# Patient Record
Sex: FEMALE | Race: WHITE | NOT HISPANIC OR LATINO | Employment: FULL TIME | URBAN - METROPOLITAN AREA
[De-identification: names, ages, dates, MRNs, and addresses within clinical notes are randomized per-mention and may not be internally consistent; named-entity substitution may affect disease eponyms.]

---

## 2017-09-07 ENCOUNTER — ALLSCRIPTS OFFICE VISIT (OUTPATIENT)
Dept: OTHER | Facility: OTHER | Age: 32
End: 2017-09-07

## 2017-09-07 DIAGNOSIS — R91.1 SOLITARY PULMONARY NODULE: ICD-10-CM

## 2017-09-07 DIAGNOSIS — K59.09 OTHER CONSTIPATION: ICD-10-CM

## 2017-09-07 DIAGNOSIS — K80.20 CALCULUS OF GALLBLADDER WITHOUT CHOLECYSTITIS WITHOUT OBSTRUCTION: ICD-10-CM

## 2017-09-12 ENCOUNTER — GENERIC CONVERSION - ENCOUNTER (OUTPATIENT)
Dept: OTHER | Facility: OTHER | Age: 32
End: 2017-09-12

## 2017-09-13 LAB
25(OH)D3 SERPL-MCNC: 35.2 NG/ML (ref 30–100)
A/G RATIO (HISTORICAL): 1.9 (ref 1.2–2.2)
ALBUMIN SERPL BCP-MCNC: 4.2 G/DL (ref 3.5–5.5)
ALP SERPL-CCNC: 40 IU/L (ref 39–117)
ALT SERPL W P-5'-P-CCNC: 12 IU/L (ref 0–32)
AST SERPL W P-5'-P-CCNC: 17 IU/L (ref 0–40)
BACTERIA UR QL AUTO: NORMAL
BASOPHILS # BLD AUTO: 0 X10E3/UL (ref 0–0.2)
BASOPHILS # BLD AUTO: 1 %
BILIRUB SERPL-MCNC: 0.6 MG/DL (ref 0–1.2)
BILIRUB UR QL STRIP: NEGATIVE
BUN SERPL-MCNC: 10 MG/DL (ref 6–20)
BUN/CREA RATIO (HISTORICAL): 15 (ref 9–23)
CALCIUM SERPL-MCNC: 9.1 MG/DL (ref 8.7–10.2)
CHLORIDE SERPL-SCNC: 102 MMOL/L (ref 96–106)
CHOLEST SERPL-MCNC: 141 MG/DL (ref 100–199)
CHOLEST/HDLC SERPL: 1.7 RATIO UNITS (ref 0–4.4)
CO2 SERPL-SCNC: 25 MMOL/L (ref 18–29)
COLOR UR: YELLOW
COMMENT (HISTORICAL): CLEAR
CREAT SERPL-MCNC: 0.66 MG/DL (ref 0.57–1)
DEPRECATED RDW RBC AUTO: 13 % (ref 12.3–15.4)
EGFR AFRICAN AMERICAN (HISTORICAL): 135 ML/MIN/1.73
EGFR-AMERICAN CALC (HISTORICAL): 117 ML/MIN/1.73
EOSINOPHIL # BLD AUTO: 0.1 X10E3/UL (ref 0–0.4)
EOSINOPHIL # BLD AUTO: 2 %
FECAL OCCULT BLOOD DIAGNOSTIC (HISTORICAL): NEGATIVE
GLUCOSE (HISTORICAL): NEGATIVE
GLUCOSE SERPL-MCNC: 88 MG/DL (ref 65–99)
HCT VFR BLD AUTO: 37.1 % (ref 34–46.6)
HDLC SERPL-MCNC: 83 MG/DL
HGB BLD-MCNC: 12.6 G/DL (ref 11.1–15.9)
IMM.GRANULOCYTES (CD4/8) (HISTORICAL): 0 %
IMM.GRANULOCYTES (CD4/8) (HISTORICAL): 0 X10E3/UL (ref 0–0.1)
KETONES UR STRIP-MCNC: NEGATIVE MG/DL
LDL CHOLESTEROL DIRECT (HISTORICAL): 63 MG/DL (ref 0–99)
LDLC SERPL CALC-MCNC: 51 MG/DL (ref 0–99)
LEUKOCYTE ESTERASE UR QL STRIP: NEGATIVE
LYMPHOCYTES # BLD AUTO: 1.5 X10E3/UL (ref 0.7–3.1)
LYMPHOCYTES # BLD AUTO: 27 %
MCH RBC QN AUTO: 32.2 PG (ref 26.6–33)
MCHC RBC AUTO-ENTMCNC: 34 G/DL (ref 31.5–35.7)
MCV RBC AUTO: 95 FL (ref 79–97)
MICROSCOPIC EXAMINATION (HISTORICAL): NORMAL
MICROSCOPIC EXAMINATION (HISTORICAL): NORMAL
MONOCYTES # BLD AUTO: 0.4 X10E3/UL (ref 0.1–0.9)
MONOCYTES (HISTORICAL): 7 %
MUCUS THREADS (HISTORICAL): PRESENT
NEUTROPHILS # BLD AUTO: 3.6 X10E3/UL (ref 1.4–7)
NEUTROPHILS # BLD AUTO: 63 %
NITRITE UR QL STRIP: NEGATIVE
NON-SQ EPI CELLS URNS QL MICRO: NORMAL /HPF
PH UR STRIP.AUTO: 6.5 [PH] (ref 5–7.5)
PLATELET # BLD AUTO: 227 X10E3/UL (ref 150–379)
POTASSIUM SERPL-SCNC: 4.1 MMOL/L (ref 3.5–5.2)
PROT UR STRIP-MCNC: NEGATIVE MG/DL
RBC (HISTORICAL): 3.91 X10E6/UL (ref 3.77–5.28)
RBC (HISTORICAL): NORMAL /HPF
SODIUM SERPL-SCNC: 140 MMOL/L (ref 134–144)
SP GR UR STRIP.AUTO: 1.01 (ref 1–1.03)
T3FREE SERPL-MCNC: 2.6 PG/ML (ref 2–4.4)
T4 FREE SERPL-MCNC: 0.96 NG/DL (ref 0.82–1.77)
THYROGLOBULIN AB (HISTORICAL): <1 IU/ML (ref 0–0.9)
THYROID MICROSOMAL ANTIBODY (HISTORICAL): 7 IU/ML (ref 0–34)
TOT. GLOBULIN, SERUM (HISTORICAL): 2.2 G/DL (ref 1.5–4.5)
TOTAL PROTEIN (HISTORICAL): 6.4 G/DL (ref 6–8.5)
TRIGL SERPL-MCNC: 36 MG/DL (ref 0–149)
TSH SERPL DL<=0.05 MIU/L-ACNC: 1.88 UIU/ML (ref 0.45–4.5)
URINALYSIS (UA) (HISTORICAL): NORMAL
UROBILINOGEN UR QL STRIP.AUTO: 0.2 EU/DL (ref 0.2–1)
VLDLC SERPL CALC-MCNC: 7 MG/DL (ref 5–40)
WBC # BLD AUTO: 5.7 X10E3/UL (ref 3.4–10.8)
WBC # BLD AUTO: NORMAL /HPF

## 2017-09-14 ENCOUNTER — HOSPITAL ENCOUNTER (OUTPATIENT)
Dept: ULTRASOUND IMAGING | Facility: CLINIC | Age: 32
Discharge: HOME | End: 2017-09-14
Payer: COMMERCIAL

## 2017-09-14 DIAGNOSIS — K80.20 CALCULUS OF GALLBLADDER WITHOUT CHOLECYSTITIS WITHOUT OBSTRUCTION: ICD-10-CM

## 2017-09-14 DIAGNOSIS — K59.09 OTHER CONSTIPATION: ICD-10-CM

## 2017-09-14 PROCEDURE — 76700 US EXAM ABDOM COMPLETE: CPT

## 2017-09-18 ENCOUNTER — GENERIC CONVERSION - ENCOUNTER (OUTPATIENT)
Dept: OTHER | Facility: OTHER | Age: 32
End: 2017-09-18

## 2017-09-21 ENCOUNTER — GENERIC CONVERSION - ENCOUNTER (OUTPATIENT)
Dept: OTHER | Facility: OTHER | Age: 32
End: 2017-09-21

## 2017-10-30 ENCOUNTER — ALLSCRIPTS OFFICE VISIT (OUTPATIENT)
Dept: OTHER | Facility: OTHER | Age: 32
End: 2017-10-30

## 2017-11-01 ENCOUNTER — GENERIC CONVERSION - ENCOUNTER (OUTPATIENT)
Dept: OTHER | Facility: OTHER | Age: 32
End: 2017-11-01

## 2017-11-01 NOTE — PROGRESS NOTES
Assessment  1  Abnormal weight gain (783 1) (R63 5)   2  Chronic constipation (564 00) (K59 09)    Plan  Abnormal weight gain    · 1 - Beba Willard  Endocrinology Co-Management  *  Status: Active  Requested  for: [id number]  Care Summary provided  : Yes  Dermatitis    · 2 - Genaro Jarvis MD, Gina Irvin (Dermatology) Co-Management  *  Status: Active  Requested  for: [id number]  Care Summary provided  : Yes    Discussion/Summary    1  Chronic constipation-the patient will try the medication prescribed by GI as ordered  She will follow up with him as scheduled  She will continue with her healthy diet and will increase her water intake  Difficulty losing weight all the patient's lab work came back normal  There is no evidence of hypothyroidism  I am going to refer her to Endocrinology as ordered to help determine if there are any other metabolic causes of her difficulty losing weight  We will see her back as scheduled  Possible side effects of new medications were reviewed with the patient/guardian today  The treatment plan was reviewed with the patient/guardian  The patient/guardian understands and agrees with the treatment plan      Chief Complaint  Follow up testing, stopped taking Amitiza gotten stomach cramps and nausea  Patient is here today for follow up of chronic conditions described in HPI  History of Present Illness  Itzel Spicer is a 70-year-old female who presents today for follow-up of her chronic constipation  She did see GI in follow-up for this  They do want her to have a colonoscopy, but she has not scheduled due to scheduling issues  She could not tolerate the Amitiza due to stomach cramps  They gave her samples of Trulance for her constipation and she did not start it because she was worried about the symptoms  She is just using herbal laxatives with relief  She will try the Trulance  is exercising 3 days a week  She is trying to watch her calories and diet  There are normal menses   had tried different diet regimens and saw a nutritionist  is due to see a GYN  The patient is being seen for follow-up of constipation  Interval symptoms:  stable infrequent stools,-- stable straining at stools,-- stable hard stools,-- stable small stools,-- stable anal leakage,-- stable abdominal pain,-- stable bloating-- and-- stable   No associated symptoms are reported  Review of Systems    Constitutional: as noted in HPI  Eyes: as noted in HPI    ENT: as noted in HPI  Cardiovascular: as noted in HPI  Respiratory: as noted in HPI  Gastrointestinal: as noted in HPI  Genitourinary: as noted in HPI  Musculoskeletal: as noted in HPI  Integumentary: as noted in HPI  Neurological: as noted in HPI  Psychiatric: as noted in HPI  Active Problems  1  Chronic constipation (564 00) (K59 09)   2  Gallstone (574 20) (K80 20)   3  Pulmonary nodule seen on imaging study (793 11) (R91 1)   4  Vitamin D deficiency (268 9) (E55 9)    Past Medical History  1  History of asthma (V12 69) (Z87 09)   2  History of cholelithiasis (V12 79) (Z87 19)   3  History of iron deficiency anemia (V12 3) (Z86 2)   4  History of menorrhagia (V13 29) (Z87 42)   5  History of palpitations (V12 59) (Z87 898)    The active problems and past medical history were reviewed and updated today  Surgical History  1  History of Oral Surgery Tooth Extraction Emerson Tooth    The surgical history was reviewed and updated today  Family History  Mother    1  Family history of heart failure (V17 49) (Z82 49)  Sister    2  Family history of hypothyroidism (V18 19) (Z83 49)    The family history was reviewed and updated today  Social History   · Former smoker (H02 77) (C37 974)   · Four children   · Housewife or homemaker   ·    · No illicit drug use   · Occasional alcohol use   ·  (V61 03) (Z63 5)  The social history was reviewed and updated today  The social history was reviewed and is unchanged  Current Meds   1  No Reported Medications Recorded    Allergies  1  Amitiza    Vitals  Vital Signs    Recorded: [id number] 11:58AM   Heart Rate 60   Systolic 682, RUE, Sitting   Diastolic 70, RUE, Sitting   Height 5 ft 4 in   Weight 150 lb    BMI Calculated 25 75   BSA Calculated 1 73     Physical Exam    Constitutional   General appearance: No acute distress, well appearing and well nourished  Ears, Nose, Mouth, and Throat   External inspection of ears and nose: Normal     Otoscopic examination: Tympanic membranes translucent with normal light reflex  Canals patent without erythema  Nasal mucosa, septum, and turbinates: Normal without edema or erythema  Oropharynx: Normal with no erythema, edema, exudate or lesions  Pulmonary   Respiratory effort: No increased work of breathing or signs of respiratory distress  Auscultation of lungs: Clear to auscultation  Auscultation of the lungs revealed no expiratory wheezing,-- normal expiratory time-- and-- no inspiratory wheezing  no rales or crackles were heard bilaterally  no rhonchi  no friction rub  no wheezing  no diminished breath sounds  no bronchial breath sounds  Cardiovascular   Auscultation of heart: Normal rate and rhythm, normal S1 and S2, without murmurs  The heart rate was normal  Heart sounds: normal S1,-- normal S2,-- no S3-- and-- no S4  no murmurs were heard  Examination of extremities for edema and/or varicosities: Normal     Carotid pulses: Normal     Abdomen   Abdomen: Abnormal   The abdomen was flat  Bowel sounds were normal  Skin findings: no collateral vein dilation,-- no striae,-- no scars,-- no ostomy-- and-- no drain  There was mild tenderness that was diffuse  The abdomen was firm, but-- not rigid  No rebound tenderness  No guarding  There was a negative Hussein's sign,-- negative Rovsing's sign,-- negative obturator sign-- and-- negative psoas sign  no masses palpated  The abdomen was normal to percussion   no CVA tenderness Liver and spleen: No hepatomegaly or splenomegaly  Lymphatic   Palpation of lymph nodes in neck: No lymphadenopathy  Musculoskeletal   Gait and station: Normal     Digits and nails: Normal without clubbing or cyanosis  Inspection/palpation of joints, bones, and muscles: Normal          Results/Data  (1) TSH [id number] 10:28AM Jennie Kc     Test Name Result Flag Reference   TSH 1 880 uIU/mL  0 450-4 500     (1) FREE T3 [id number] 10:28AM Jennie Kc     Test Name Result Flag Reference   Triiodothyronine,Free,Serum 2 6 pg/mL  2 0-4 4     (1) VITAMIN D 25-HYDROXY [id number] 10:28AM Jennie Kc     Test Name Result Flag Reference   Vitamin D, 25-Hydroxy 35 2 ng/mL  30 0-100 0   Vitamin D deficiency has been defined by the [address] practice guideline as a  level of serum 25-OH vitamin D less than 20 ng/mL (1,2)  The Endocrine Society went on to further define vitamin D  insufficiency as a level between 21 and 29 ng/mL (2)  1  IOM ([place] [employer])  2010  Dietary reference     intakes for calcium and D  [facility]: The     [employer]  2  Diana MF, Sarath NC, Irvin CERON, et al      Evaluation, treatment, and prevention of vitamin D     deficiency: an Endocrine Society clinical practice     guideline  JCEM  2011 Jul; 96(7):1911-30  (1) CBC/PLT/DIFF 53Juw1905 10:28AM Jennie Kc     Test Name Result Flag Reference   WBC 5 7 x10E3/uL  3 4-10 8   RBC 3 91 x10E6/uL  3 77-5 28   Hemoglobin 12 6 g/dL  11 1-15 9   Hematocrit 37 1 %  34 0-46  6   MCV 95 fL  79-97   MCH 32 2 pg  26 6-33 0   MCHC 34 0 g/dL  31 5-35 7   RDW 13 0 %  12 3-15 4   Platelets 021 Q28N6/KA  150-379   Neutrophils 63 %     Lymphs 27 %     Monocytes 7 %     Eos 2 %     Basos 1 %     Neutrophils (Absolute) 3 6 x10E3/uL  1 4-7 0   Lymphs (Absolute) 1 5 x10E3/uL  0 7-3 1   Monocytes(Absolute) 0 4 x10E3/uL  0 1-0 9   Eos (Absolute) 0 1 x10E3/uL  0 0-0 4   Baso (Absolute) 0 0 x10E3/uL  0 0-0 2   Immature Granulocytes 0 %     Immature Grans (Abs) 0 0 x10E3/uL  0 0-0 1     (1) COMPREHENSIVE METABOLIC PANEL [id number] 79:91PZ [facility]     Test Name Result Flag Reference   Glucose, Serum 88 mg/dL  65-99   BUN 10 mg/dL  6-20   Creatinine, Serum 0 66 mg/dL  0 57-1 00   BUN/Creatinine Ratio 15  9-23   Sodium, Serum 140 mmol/L  134-144   Potassium, Serum 4 1 mmol/L  3 5-5 2   Chloride, Serum 102 mmol/L     Carbon Dioxide, Total 25 mmol/L  18-29   Calcium, Serum 9 1 mg/dL  8 7-10 2   Protein, Total, Serum 6 4 g/dL  6 0-8 5   Albumin, Serum 4 2 g/dL  3 5-5 5   Globulin, Total 2 2 g/dL  1 5-4 5   A/G Ratio 1 9  1 2-2 2   Bilirubin, Total 0 6 mg/dL  0 0-1 2   Alkaline Phosphatase, S 40 IU/L     AST (SGOT) 17 IU/L  0-40   ALT (SGPT) 12 IU/L  0-32   eGFR If NonAfricn Am 117 mL/min/1 73  >59   eGFR If Africn Am 135 mL/min/1 73  >59     (1) LIPID PANEL, FASTING [id number] 10:28AM [facility]     Test Name Result Flag Reference   Cholesterol, Total 141 mg/dL  100-199   Triglycerides 36 mg/dL  0-149   HDL Cholesterol 83 mg/dL  >39   VLDL Cholesterol Larry 7 mg/dL  5-40   LDL Cholesterol Calc 51 mg/dL  0-99   T  Chol/HDL Ratio 1 7 ratio units  0 0-4 4   T  Chol/HDL Ratio                                                             Men  Women                                               1/2 Avg  Risk  3 4    3 3                                                   Avg Risk  5 0    4 4                                                2X Avg  Risk  9 6    7 1                                                3X Avg  Risk 23 4   11 0     (1) LDL,DIRECT 74Rib7376 10:28AM [facility]     Test Name Result Flag Reference   LDL Chol   (Direct) 63 mg/dL  0-99     [facility]) Thyroxine (T4) Free, Direct, S [id number] 10:28AM [employer]     Test Name Result Flag Reference   T4,Free(Direct) 0 96 ng/dL  0 82-1 77     [facility]) Thyroid Antibodies [id number] 10:28AM Crissy Lemons     Test Name Result Flag Reference   Thyroid Peroxidase (TPO) Ab 7 IU/mL  0-34   Thyroglobulin Antibody Thyroglobulin Ab <1 0 IU/mL  0 0-0 9   Thyroglobulin Antibody measured by Mario Alberto Jessica Methodology     [facility]) UA/M w/rflx Culture, Comp [id number] 10:28AM Olinda Calabrese     Test Name Result Flag Reference   Specific Gravity 1 009  1 005-1 030   pH 6 5  5 0-7 5   Urine-Color Yellow  Yellow   Appearance Clear  Clear   WBC Esterase Negative  Negative   Protein Negative  Negative/Trace   Glucose Negative  Negative   Ketones Negative  Negative   Occult Blood Negative  Negative   Bilirubin Negative  Negative   Urobilinogen,Semi-Qn 0 2 EU/dL  0 2-1 0   Nitrite, Urine Negative  Negative   Microscopic Examination Comment     Microscopic follows if indicated  Microscopic Examination See below:     Urinalysis Reflex Comment     This specimen will not reflex to a Urine Culture  WBC 0-5 /hpf  0 -  5   RBC 0-2 /hpf  0 -  2   Epithelial Cells (non renal) 0-10 /hpf  0 - 10   Mucus Threads Present  Not Estab     Bacteria Few  None seen/Few     Signatures   Electronically signed by : Luis Enriquedavi Edmondson DO; Oct 31 2017  5:11AM EST                       (Author)

## 2018-01-12 VITALS
TEMPERATURE: 98.8 F | DIASTOLIC BLOOD PRESSURE: 68 MMHG | HEART RATE: 74 BPM | WEIGHT: 143 LBS | SYSTOLIC BLOOD PRESSURE: 104 MMHG | RESPIRATION RATE: 15 BRPM | BODY MASS INDEX: 24.41 KG/M2 | HEIGHT: 64 IN

## 2018-01-14 VITALS
HEIGHT: 64 IN | DIASTOLIC BLOOD PRESSURE: 70 MMHG | SYSTOLIC BLOOD PRESSURE: 100 MMHG | BODY MASS INDEX: 25.61 KG/M2 | HEART RATE: 60 BPM | WEIGHT: 150 LBS

## 2018-01-18 NOTE — MISCELLANEOUS
Message   Recorded as Task   Date: 09/18/2017 05:08 PM, Created By: Perry Santoyo   Task Name: Care Coordination   Assigned To: Donte Henriquez   Regarding Patient: Bakari Hdz, Status: Active   Comment:    Candi Lozano - 18 Sep 2017 5:08 PM     TASK CREATED  Please let the patient know that so far her labs and ultrasound are all coming back normal   I will see her back as scheduled  Her thyroid levels look fine  Also please let her know that her daughters labs also came back normal   There is no evidence of hypoglycemia    09/18/2017 Patient notified  trb      Active Problems    1  Chronic constipation (564 00) (K59 09)   2  Gallstone (574 20) (K80 20)   3  Hypothyroidism (244 9) (E03 9)   4  Pulmonary nodule seen on imaging study (793 11) (R91 1)   5  Vitamin D deficiency (268 9) (E55 9)    Current Meds   1  Amitiza 24 MCG Oral Capsule; Take 1 capsule twice daily; Therapy: [id number] to (Last Rx:[id number])  Requested for: 21Crq7868 Ordered    Allergies    1  No Known Drug Allergies    Signatures   Electronically signed by :  Braydon Ybarra, ; Sep 18 2017  5:32PM EST                       (Author)

## 2018-02-13 ENCOUNTER — OFFICE VISIT (OUTPATIENT)
Dept: URGENT CARE | Facility: CLINIC | Age: 33
End: 2018-02-13
Payer: COMMERCIAL

## 2018-02-13 VITALS
WEIGHT: 152 LBS | HEIGHT: 64 IN | DIASTOLIC BLOOD PRESSURE: 61 MMHG | SYSTOLIC BLOOD PRESSURE: 101 MMHG | OXYGEN SATURATION: 100 % | RESPIRATION RATE: 18 BRPM | HEART RATE: 68 BPM | BODY MASS INDEX: 25.95 KG/M2 | TEMPERATURE: 98.2 F

## 2018-02-13 DIAGNOSIS — H01.001 BLEPHARITIS OF RIGHT UPPER EYELID, UNSPECIFIED TYPE: Primary | ICD-10-CM

## 2018-02-13 PROCEDURE — G0382 LEV 3 HOSP TYPE B ED VISIT: HCPCS | Performed by: FAMILY MEDICINE

## 2018-02-13 PROCEDURE — 99283 EMERGENCY DEPT VISIT LOW MDM: CPT | Performed by: FAMILY MEDICINE

## 2018-02-13 RX ORDER — DOXYCYCLINE 100 MG/1
100 TABLET ORAL 2 TIMES DAILY
Qty: 20 TABLET | Refills: 0 | Status: SHIPPED | OUTPATIENT
Start: 2018-02-13 | End: 2018-02-13 | Stop reason: CLARIF

## 2018-02-13 RX ORDER — DOXYCYCLINE 100 MG/1
100 TABLET ORAL 2 TIMES DAILY
Qty: 20 TABLET | Refills: 0 | Status: SHIPPED | OUTPATIENT
Start: 2018-02-13 | End: 2018-02-23

## 2018-02-13 NOTE — PATIENT INSTRUCTIONS
We are treating this as a blepharitis  This is considered an infection of the leads and the eyelashes as they junction  Use the ointment as written  If this fails use the oral doxycycline    If that fails follow-up with ophthalmologist

## 2018-02-13 NOTE — PROGRESS NOTES
Assessment/Plan:      Diagnoses and all orders for this visit:    Blepharitis of right upper eyelid, unspecified type  -     tobramycin (TOBREX) 0 3 % OINT; Administer 0 5 inches to the right eye 3 (three) times a day  -     Discontinue: doxycycline (ADOXA) 100 MG tablet; Take 1 tablet (100 mg total) by mouth 2 (two) times a day for 10 days  -     doxycycline (ADOXA) 100 MG tablet; Take 1 tablet (100 mg total) by mouth 2 (two) times a day for 10 days          Subjective:     Patient ID: Vianca Duran is a 35 y o  female  Patient is a 45-year-old female generally in good health who presents with the swelling and crusting of the right upper lid primarily  The eye does not appear to be affected  There is no redness of the globe  Review of Systems   Constitutional: Negative  Eyes: Positive for pain and discharge  Respiratory: Negative  Musculoskeletal: Negative  Objective:     Physical Exam   Constitutional: She appears well-developed and well-nourished  HENT:   Head: Normocephalic and atraumatic  Eyes:   There is crusting and swelling of the right upper eyelid  The area is slightly puffy

## 2018-04-11 DIAGNOSIS — L60.0 INGROWN NAIL: Primary | ICD-10-CM

## 2018-05-15 ENCOUNTER — OFFICE VISIT (OUTPATIENT)
Dept: FAMILY MEDICINE CLINIC | Facility: CLINIC | Age: 33
End: 2018-05-15
Payer: COMMERCIAL

## 2018-05-15 VITALS
BODY MASS INDEX: 24.92 KG/M2 | HEIGHT: 64 IN | WEIGHT: 146 LBS | DIASTOLIC BLOOD PRESSURE: 70 MMHG | SYSTOLIC BLOOD PRESSURE: 112 MMHG | HEART RATE: 60 BPM | TEMPERATURE: 98 F | RESPIRATION RATE: 14 BRPM

## 2018-05-15 DIAGNOSIS — R61 NIGHT SWEATS: ICD-10-CM

## 2018-05-15 DIAGNOSIS — R23.2 HOT FLASHES: Primary | ICD-10-CM

## 2018-05-15 PROBLEM — R91.1 PULMONARY NODULE SEEN ON IMAGING STUDY: Status: ACTIVE | Noted: 2017-09-07

## 2018-05-15 PROBLEM — K59.09 CHRONIC CONSTIPATION: Status: ACTIVE | Noted: 2017-09-07

## 2018-05-15 PROBLEM — E55.9 VITAMIN D DEFICIENCY: Status: ACTIVE | Noted: 2017-08-23

## 2018-05-15 PROBLEM — R63.5 ABNORMAL WEIGHT GAIN: Status: ACTIVE | Noted: 2017-10-30

## 2018-05-15 PROCEDURE — 99213 OFFICE O/P EST LOW 20 MIN: CPT | Performed by: FAMILY MEDICINE

## 2018-05-15 PROCEDURE — 3008F BODY MASS INDEX DOCD: CPT | Performed by: FAMILY MEDICINE

## 2018-05-15 RX ORDER — ELECTROLYTES/DEXTROSE
1 SOLUTION, ORAL ORAL
COMMUNITY

## 2018-05-15 NOTE — PROGRESS NOTES
Assessment/Plan:  Night sweats and hot flashes-we will send the patient for the testing as ordered to further evaluate her symptoms and look for underlying causes  She will follow up with her gynecologist as scheduled  She is encouraged to monitor her diet and increase her exercise  We also talked about different ways to help with stress such as exercise and meditation  We will see her back as scheduled  Diagnoses and all orders for this visit:    Hot flashes  -     FSH and LH; Future  -     Estradiol; Future  -     TSH, 3rd generation; Future  -     T4, free; Future  -     Testosterone, free, total; Future  -     DHEA; Future  -     Comprehensive metabolic panel; Future  -     CBC and differential; Future  -     FSH and LH  -     TSH, 3rd generation  -     T4, free  -     Testosterone, free, total  -     DHEA  -     Comprehensive metabolic panel  -     CBC and differential  -     Hepatitis panel, acute; Future  -     HIV-1 RNA, quantitative, PCR; Future  -     Hepatitis panel, acute  -     HIV-1 RNA, quantitative, PCR    Night sweats  -     FSH and LH; Future  -     Estradiol; Future  -     TSH, 3rd generation; Future  -     T4, free; Future  -     Testosterone, free, total; Future  -     DHEA; Future  -     Comprehensive metabolic panel; Future  -     CBC and differential; Future  -     FSH and LH  -     TSH, 3rd generation  -     T4, free  -     Testosterone, free, total  -     DHEA  -     Comprehensive metabolic panel  -     CBC and differential  -     Hepatitis panel, acute; Future  -     HIV-1 RNA, quantitative, PCR; Future  -     Hepatitis panel, acute  -     HIV-1 RNA, quantitative, PCR    Other orders  -     Discontinue: Cholecalciferol 59476 units TABS; Take 5,000 Units by mouth  -     Multiple Vitamins-Minerals (MULTIVITAMIN ADULT) TABS; Take 1 tablet by mouth       Return for Follow up after blood work          Subjective:   Chief Complaint   Patient presents with    Night Sweats     night sweats and hot flashes with period        Patient ID: Cory Medel is a 35 y o  female presents today for to discuss new symptoms  Sandy Koehler is a 35 y o  female who presents with night sweats and hot flashes with her  She has severe night sweats with her periods  She is interested in hormonal testing  She was seen seen in the ER at [facility] for CP and had normal BW  She had a normal workup  She is under a lot of stress  There are night sweats on and off  Her periods are regular  They have not changed  She has had the night sweats for 1 year on and off, but the last cycle was intense  She had a recent GYN exam and it was normal     There are no palpitations  There are no fevers  There is No nausea, vomiting, or stomach pain  She has gained weight gradually  She has a good appetite  There are no urinary problems  The chest pain has been intense  The following portions of the patient's history were reviewed and updated as appropriate: allergies, current medications, past family history, past medical history, past social history, past surgical history and problem list   Patient Active Problem List   Diagnosis    Abnormal weight gain    Cholelithiasis without obstruction    Chronic constipation    Hypothyroidism due to acquired atrophy of thyroid    Iron deficiency anemia due to chronic blood loss    Pulmonary nodule seen on imaging study    Vitamin D deficiency     History reviewed  No pertinent past medical history  History reviewed  No pertinent surgical history  Allergies   Allergen Reactions    Lubiprostone Nausea Only and GI Intolerance     History reviewed  No pertinent family history  Social History     Social History    Marital status: Legally      Spouse name: N/A    Number of children: N/A    Years of education: N/A     Occupational History    Not on file       Social History Main Topics    Smoking status: Former Smoker    Smokeless tobacco: Never Used  Alcohol use Yes      Comment: socially    Drug use: No    Sexual activity: Not on file     Other Topics Concern    Not on file     Social History Narrative    No narrative on file     Current Outpatient Prescriptions on File Prior to Visit   Medication Sig Dispense Refill    [DISCONTINUED] tobramycin (TOBREX) 0 3 % OINT Administer 0 5 inches to the right eye 3 (three) times a day 5 g 0     No current facility-administered medications on file prior to visit  Review of Systems   Constitutional: Negative  HENT: Negative  Eyes: Negative  Respiratory: Negative  Negative for apnea, cough, choking, chest tightness, shortness of breath, wheezing and stridor  Cardiovascular: Positive for palpitations  Negative for chest pain and leg swelling  Gastrointestinal: Negative  Endocrine: Negative  Genitourinary: Negative  Objective:  Vitals:    05/15/18 1144   BP: 112/70   BP Location: Right arm   Patient Position: Sitting   Cuff Size: Standard   Pulse: 60   Resp: 14   Temp: 98 °F (36 7 °C)   TempSrc: Tympanic   Weight: 66 2 kg (146 lb)   Height: 5' 4" (1 626 m)     Body mass index is 25 06 kg/m²  Wt Readings from Last 3 Encounters:   05/15/18 66 2 kg (146 lb)   02/13/18 68 9 kg (152 lb)   10/30/17 68 kg (150 lb)     Temp Readings from Last 3 Encounters:   05/15/18 98 °F (36 7 °C) (Tympanic)   02/13/18 98 2 °F (36 8 °C) (Tympanic)   09/07/17 98 8 °F (37 1 °C) (Tympanic)     BP Readings from Last 3 Encounters:   05/15/18 112/70   02/13/18 101/61   10/30/17 100/70     Pulse Readings from Last 3 Encounters:   05/15/18 60   02/13/18 68   10/30/17 60        Physical Exam   Constitutional: She is oriented to person, place, and time  She appears well-developed and well-nourished  HENT:   Head: Normocephalic and atraumatic  Mouth/Throat: No oropharyngeal exudate  Eyes: Conjunctivae and EOM are normal  Pupils are equal, round, and reactive to light  Neck: Normal range of motion   No JVD present  No tracheal deviation present  No thyromegaly present  Cardiovascular: Normal rate, regular rhythm, normal heart sounds and intact distal pulses  Exam reveals no gallop and no friction rub  No murmur heard  Pulmonary/Chest: Effort normal and breath sounds normal  No stridor  No respiratory distress  She has no wheezes  She has no rales  She exhibits no tenderness  Abdominal: Soft  Bowel sounds are normal  She exhibits no distension and no mass  There is no tenderness  There is no rebound and no guarding  Musculoskeletal: Normal range of motion  She exhibits no edema, tenderness or deformity  Lymphadenopathy:     She has no cervical adenopathy  Neurological: She is alert and oriented to person, place, and time  She has normal reflexes  No cranial nerve deficit  She exhibits normal muscle tone  Coordination normal    Skin: Skin is warm and dry  No visits with results within 2 Month(s) from this visit     Latest known visit with results is:   Generic Conversion - Encounter on 09/12/2017   Component Date Value    THYROID MICROSOMAL ANTIB* 09/12/2017 7     THYROGLOBULIN AB 09/12/2017 <1 0     Specific [place], UA 09/12/2017 1 009     pH, UA 09/12/2017 6 5     Color, UA 09/12/2017 Yellow     Comment 09/12/2017 Clear     Leukocytes, UA 09/12/2017 Negative     Protein, UA 09/12/2017 Negative     Glucose 09/12/2017 Negative     Ketones, UA 09/12/2017 Negative     FECAL OCCULT BLOOD DIAGN* 09/12/2017 Negative     Bilirubin, UA 09/12/2017 Negative     Urobilinogen, UA 09/12/2017 0 2     Nitrite, UA 09/12/2017 Negative     MICROSCOPIC EXAMINATION * 09/12/2017 Comment     MICROSCOPIC EXAMINATION * 09/12/2017 See below:     URINALYSIS (UA) (HISTORI* 09/12/2017 Comment     WBC 09/12/2017 0-5     RBC 09/12/2017 0-2     Epithelial Cells 09/12/2017 0-10     MUCUS THREADS (HISTORICA* 09/12/2017 Present     Bacteria, UA 09/12/2017 Few     Free T4 09/12/2017 0 96

## 2018-05-17 LAB
ALBUMIN SERPL-MCNC: 4.3 G/DL (ref 3.5–5.5)
ALBUMIN/GLOB SERPL: 1.7 {RATIO} (ref 1.2–2.2)
ALP SERPL-CCNC: 42 IU/L (ref 39–117)
ALT SERPL-CCNC: 14 IU/L (ref 0–32)
AST SERPL-CCNC: 19 IU/L (ref 0–40)
BASOPHILS # BLD AUTO: 0.1 X10E3/UL (ref 0–0.2)
BASOPHILS NFR BLD AUTO: 1 %
BILIRUB SERPL-MCNC: 0.3 MG/DL (ref 0–1.2)
BUN SERPL-MCNC: 15 MG/DL (ref 6–20)
BUN/CREAT SERPL: 20 (ref 9–23)
CALCIUM SERPL-MCNC: 9.2 MG/DL (ref 8.7–10.2)
CHLORIDE SERPL-SCNC: 101 MMOL/L (ref 96–106)
CO2 SERPL-SCNC: 25 MMOL/L (ref 18–29)
CREAT SERPL-MCNC: 0.75 MG/DL (ref 0.57–1)
DHEA SERPL-MCNC: 208 NG/DL (ref 31–701)
EOSINOPHIL # BLD AUTO: 0.1 X10E3/UL (ref 0–0.4)
EOSINOPHIL NFR BLD AUTO: 2 %
ERYTHROCYTE [DISTWIDTH] IN BLOOD BY AUTOMATED COUNT: 13 % (ref 12.3–15.4)
ESTRADIOL SERPL-MCNC: 49.8 PG/ML
FSH SERPL-ACNC: 9 MIU/ML
GLOBULIN SER-MCNC: 2.5 G/DL (ref 1.5–4.5)
GLUCOSE SERPL-MCNC: 76 MG/DL (ref 65–99)
HCT VFR BLD AUTO: 37.2 % (ref 34–46.6)
HGB BLD-MCNC: 12.8 G/DL (ref 11.1–15.9)
IMM GRANULOCYTES # BLD: 0 X10E3/UL (ref 0–0.1)
IMM GRANULOCYTES NFR BLD: 0 %
LH SERPL-ACNC: 10.7 MIU/ML
LYMPHOCYTES # BLD AUTO: 1.6 X10E3/UL (ref 0.7–3.1)
LYMPHOCYTES NFR BLD AUTO: 22 %
MCH RBC QN AUTO: 32.8 PG (ref 26.6–33)
MCHC RBC AUTO-ENTMCNC: 34.4 G/DL (ref 31.5–35.7)
MCV RBC AUTO: 95 FL (ref 79–97)
MONOCYTES # BLD AUTO: 0.5 X10E3/UL (ref 0.1–0.9)
MONOCYTES NFR BLD AUTO: 7 %
NEUTROPHILS # BLD AUTO: 4.8 X10E3/UL (ref 1.4–7)
NEUTROPHILS NFR BLD AUTO: 68 %
PLATELET # BLD AUTO: 264 X10E3/UL (ref 150–379)
POTASSIUM SERPL-SCNC: 4.2 MMOL/L (ref 3.5–5.2)
PROT SERPL-MCNC: 6.8 G/DL (ref 6–8.5)
RBC # BLD AUTO: 3.9 X10E6/UL (ref 3.77–5.28)
SL AMB EGFR AFRICAN AMERICAN: 121 ML/MIN/1.73
SL AMB EGFR NON AFRICAN AMERICAN: 105 ML/MIN/1.73
SODIUM SERPL-SCNC: 139 MMOL/L (ref 134–144)
T4 FREE SERPL-MCNC: 1.03 NG/DL (ref 0.82–1.77)
TESTOST FREE SERPL-MCNC: 0.8 PG/ML (ref 0–4.2)
TESTOST SERPL-MCNC: 24 NG/DL (ref 8–48)
TSH SERPL DL<=0.005 MIU/L-ACNC: 2.6 UIU/ML (ref 0.45–4.5)
WBC # BLD AUTO: 7.1 X10E3/UL (ref 3.4–10.8)

## 2018-05-18 LAB
HAV IGM SERPL QL IA: NEGATIVE
HBV CORE IGM SERPL QL IA: NEGATIVE
HBV SURFACE AG SERPL QL IA: NEGATIVE
HCV AB S/CO SERPL IA: 0.1 S/CO RATIO (ref 0–0.9)
HIV1 RNA # SERPL NAA+PROBE: <20 COPIES/ML
HIV1 RNA SERPL NAA+PROBE-LOG#: NORMAL LOG10COPY/ML

## 2018-09-21 ENCOUNTER — OFFICE VISIT (OUTPATIENT)
Dept: URGENT CARE | Facility: CLINIC | Age: 33
End: 2018-09-21
Payer: COMMERCIAL

## 2018-09-21 VITALS
DIASTOLIC BLOOD PRESSURE: 52 MMHG | TEMPERATURE: 99.4 F | SYSTOLIC BLOOD PRESSURE: 104 MMHG | WEIGHT: 153 LBS | HEIGHT: 64 IN | BODY MASS INDEX: 26.12 KG/M2 | HEART RATE: 60 BPM | RESPIRATION RATE: 18 BRPM | OXYGEN SATURATION: 99 %

## 2018-09-21 DIAGNOSIS — J02.9 ACUTE PHARYNGITIS, UNSPECIFIED ETIOLOGY: Primary | ICD-10-CM

## 2018-09-21 DIAGNOSIS — R05.9 COUGH: ICD-10-CM

## 2018-09-21 LAB — S PYO AG THROAT QL: NEGATIVE

## 2018-09-21 PROCEDURE — G0382 LEV 3 HOSP TYPE B ED VISIT: HCPCS | Performed by: PHYSICIAN ASSISTANT

## 2018-09-21 PROCEDURE — 99283 EMERGENCY DEPT VISIT LOW MDM: CPT | Performed by: PHYSICIAN ASSISTANT

## 2018-09-21 RX ORDER — AZITHROMYCIN 250 MG/1
TABLET, FILM COATED ORAL
Qty: 6 TABLET | Refills: 0 | Status: SHIPPED | OUTPATIENT
Start: 2018-09-21 | End: 2018-09-25

## 2018-09-21 NOTE — PATIENT INSTRUCTIONS
Rapid strep negative  Given duration of symptoms will treat for probable bacterial infection  Take azithromycin as directed  Take with food and eat yogurt or a probiotic daily to decrease GI upset  I would recommend an antihistamine or flonase nasal spray daily for possible allergy related symptoms  Watch for any fevers  Follow up with your PCP for persistent symptoms  Go to the ER for any distress  Pharyngitis   AMBULATORY CARE:   Pharyngitis , or sore throat, is inflammation of the tissues and structures in your pharynx (throat)  Pharyngitis is most often caused by bacteria  It may also be caused by a cold or flu virus  Other causes include smoking, allergies, or acid reflux  Signs and symptoms that may occur with pharyngitis:   · Sore throat or pain when you swallow    · Fever, chills, and body aches    · Hoarse or raspy voice    · Cough, runny or stuffy nose, itchy or watery eyes    · Headache    · Upset stomach and loss of appetite    · Mild neck stiffness    · Swollen glands that feel like hard lumps when you touch your neck    · White and yellow pus-filled blisters in the back of your throat  Call 911 for any of the following:   · You have trouble breathing or swallowing because your throat is swollen or sore  Seek care immediately if:   · You are drooling because it hurts too much to swallow  · Your fever is higher than 102? F (39?C) or lasts longer than 3 days  · You are confused  · You taste blood in your throat  Contact your healthcare provider if:   · Your throat pain gets worse  · You have a painful lump in your throat that does not go away after 5 days  · Your symptoms do not improve after 5 days  · You have questions or concerns about your condition or care  Treatment for pharyngitis:  Viral pharyngitis will go away on its own without treatment  Your sore throat should start to feel better in 3 to 5 days for both viral and bacterial infections   You may need any of the following:  · Antibiotics  treat a bacterial infection  · NSAIDs , such as ibuprofen, help decrease swelling, pain, and fever  NSAIDs can cause stomach bleeding or kidney problems in certain people  If you take blood thinner medicine, always ask your healthcare provider if NSAIDs are safe for you  Always read the medicine label and follow directions  · Acetaminophen  decreases pain and fever  It is available without a doctor's order  Ask how much to take and how often to take it  Follow directions  Acetaminophen can cause liver damage if not taken correctly  Manage your symptoms:   · Gargle salt water  Mix ¼ teaspoon salt in an 8 ounce glass of warm water and gargle  This may help decrease swelling in your throat  · Drink liquids as directed  You may need to drink more liquids than usual  Liquids may help soothe your throat and prevent dehydration  Ask how much liquid to drink each day and which liquids are best for you  · Use a cool-steam humidifier  to help moisten the air in your room and calm your cough  · Soothe your throat  with cough drops, ice, soft foods, or popsicles  Prevent the spread of pharyngitis:  Cover your mouth and nose when you cough or sneeze  Do not share food or drinks  Wash your hands often  Use soap and water  If soap and water are unavailable, use an alcohol based hand   Follow up with your healthcare provider as directed:  Write down your questions so you remember to ask them during your visits  © 2017 [employer] [employer] Information is for End User's use only and may not be sold, redistributed or otherwise used for commercial purposes  All illustrations and images included in CareNotes® are the copyrighted property of [employer]  or [employer] Pined[employer]  The above information is an  only  It is not intended as medical advice for individual conditions or treatments   Talk to your doctor, nurse or pharmacist before following any medical regimen to see if it is safe and effective for you

## 2018-09-21 NOTE — PROGRESS NOTES
[facility]        NAME: Payton Sheffield is a 35 y o  female  : 1985    MRN: [id number]  DATE: 2018  TIME: 8:59 AM    Assessment and Plan   Acute pharyngitis, unspecified etiology [J02 9]  1  Acute pharyngitis, unspecified etiology  POCT rapid strepA    azithromycin (ZITHROMAX) 250 mg tablet   2  Cough  azithromycin (ZITHROMAX) 250 mg tablet         Patient Instructions     Rapid strep negative  Given duration of symptoms will treat for probable bacterial infection  Take azithromycin as directed  Take with food and eat yogurt or a probiotic daily to decrease GI upset  I would recommend an antihistamine or flonase nasal spray daily for possible allergy related symptoms  Watch for any fevers  Follow up with your PCP for persistent symptoms  Follow up with PCP in 3-5 days  Proceed to  ER if symptoms worsen  Chief Complaint     Chief Complaint   Patient presents with    Cold Like Symptoms     Pt reports a cough with white sputum  She is concerned about the appearance of her throat  History of Present Illness       This is a 35year old female presenting for sore throat x 2 weeks  She reports feeling a scratchy throat, and has noted that the back of her throat is very red  She has also had a cough for 2 weeks, productive of a thick, white, sticky mucus  She denies any sinus congestion, shortness of breath, fevers, difficulty swallowing, abdominal pain, nausea, vomiting, diarrhea  No history of lung problems  No medications for this  Review of Systems   Review of Systems   Constitutional: Negative for chills, fatigue and fever  HENT: Positive for sore throat  Negative for congestion, ear discharge, ear pain, rhinorrhea, sinus pain, sinus pressure and trouble swallowing  Respiratory: Positive for cough  Negative for chest tightness, shortness of breath and wheezing  Gastrointestinal: Negative for abdominal pain, diarrhea, nausea and vomiting  Musculoskeletal: Negative for arthralgias  Skin: Negative for rash  Neurological: Negative for dizziness, weakness and headaches  Current Medications       Current Outpatient Prescriptions:     azithromycin (ZITHROMAX) 250 mg tablet, Take 2 tablets today then 1 tablet daily x 4 days, Disp: 6 tablet, Rfl: 0    Multiple Vitamins-Minerals (MULTIVITAMIN ADULT) TABS, Take 1 tablet by mouth, Disp: , Rfl:     Current Allergies     Allergies as of 09/21/2018 - Reviewed 09/21/2018   Allergen Reaction Noted    Lubiprostone Nausea Only and GI Intolerance 10/30/2017            The following portions of the patient's history were reviewed and updated as appropriate: allergies, current medications, past family history, past medical history, past social history, past surgical history and problem list      Past Medical History:   Diagnosis Date    Asthma     Cholelithiasis     Iron deficiency anemia     Menorrhagia     Palpitations        Past Surgical History:   Procedure Laterality Date    WISDOM TOOTH EXTRACTION         Family History   Problem Relation Age of Onset    Heart failure Mother     Hypothyroidism Sister          Medications have been verified  Objective   /52   Pulse 60   Temp 99 4 °F (37 4 °C)   Resp 18   Ht 5' 4" (1 626 m)   Wt 69 4 kg (153 lb)   SpO2 99%   BMI 26 26 kg/m²        Physical Exam     Physical Exam   Constitutional: She appears well-developed and well-nourished  No distress  HENT:   Head: Normocephalic and atraumatic  Right Ear: Tympanic membrane, external ear and ear canal normal    Left Ear: Tympanic membrane, external ear and ear canal normal    Nose: Nose normal    Mouth/Throat: Uvula is midline and mucous membranes are normal  Posterior oropharyngeal edema (mild) and posterior oropharyngeal erythema present  No oropharyngeal exudate or tonsillar abscesses  Eyes: Conjunctivae are normal  Pupils are equal, round, and reactive to light     Neck: Normal range of motion  Neck supple  Cardiovascular: Normal rate, regular rhythm and normal heart sounds  Pulmonary/Chest: Effort normal and breath sounds normal  No respiratory distress  She has no decreased breath sounds  She has no wheezes  She has no rhonchi  She has no rales  Lymphadenopathy:     She has no cervical adenopathy  Neurological: She is alert  Skin: Skin is warm and dry  She is not diaphoretic  Nursing note and vitals reviewed

## 2018-10-11 ENCOUNTER — OFFICE VISIT (OUTPATIENT)
Dept: FAMILY MEDICINE CLINIC | Facility: CLINIC | Age: 33
End: 2018-10-11
Payer: COMMERCIAL

## 2018-10-11 VITALS
TEMPERATURE: 98.1 F | SYSTOLIC BLOOD PRESSURE: 110 MMHG | HEIGHT: 64 IN | BODY MASS INDEX: 25.78 KG/M2 | HEART RATE: 65 BPM | RESPIRATION RATE: 14 BRPM | OXYGEN SATURATION: 99 % | WEIGHT: 151 LBS | DIASTOLIC BLOOD PRESSURE: 80 MMHG

## 2018-10-11 DIAGNOSIS — F41.9 ANXIETY: ICD-10-CM

## 2018-10-11 DIAGNOSIS — R00.2 PALPITATIONS: Primary | ICD-10-CM

## 2018-10-11 PROCEDURE — 99213 OFFICE O/P EST LOW 20 MIN: CPT | Performed by: FAMILY MEDICINE

## 2018-10-11 PROCEDURE — 1036F TOBACCO NON-USER: CPT | Performed by: FAMILY MEDICINE

## 2018-10-11 NOTE — PROGRESS NOTES
Assessment/Plan:  1  Palpitations-the patient will go for the Holter monitor and the lab work as ordered to further evaluate for any underlying causes of her symptoms  Is likely that the symptoms are related to anxiety we will look for underlying cardiac causes  2  Anxiety-the patient will continue with her current treatment  We will see her back as scheduled  Diagnoses and all orders for this visit:    Palpitations  -     Holter monitor - 24 hour; Future  -     CBC and differential; Future  -     Comprehensive metabolic panel; Future  -     T4, free; Future  -     TSH, 3rd generation; Future  -     Magnesium; Future  -     CBC and differential  -     Comprehensive metabolic panel  -     T4, free  -     TSH, 3rd generation  -     Magnesium    Anxiety  -     Holter monitor - 24 hour; Future  -     CBC and differential; Future  -     Comprehensive metabolic panel; Future  -     T4, free; Future  -     TSH, 3rd generation; Future  -     Magnesium; Future  -     CBC and differential  -     Comprehensive metabolic panel  -     T4, free  -     TSH, 3rd generation  -     Magnesium       Return for Follow up after testing        Subjective:   Chief Complaint   Patient presents with    Palpitations     palpaitations waking up with heart pounding, requesting holter moniter if apporiate     Anxiety     believes she is waking up with panic attacks        Patient ID: Afshan Meredith is a 35 y o  female presents today for evaluation of palpitations  Afshan Meredith is a 35 y o  female who presents today with episodes of rapid heart beat and palpitations awaking her from her sleep  She has had this before with anxiety and feels it may be related to this  She is always waking up feeling anxious  She has had this everyday over the last 2 weeks  It will improve as she gets up it improves throughout the day  It is happening in the morning and it is lasting an hour  She checked her BP at times and it was 96/60  There is heaviness in her chest   There is no lightheadedness or dizziness  There is an an anxiety attack  There is no shortness of breath  She is stressed about her daughter's health and family issues  She had recent labs which were normal     She is currently receiving treatment with medical marijuana with a local physician for her anxiety and PTSD  She goes to a dispensary in [facility]  She is getting relief with this  There are no suicidal ideations  She is seeing a therapist regularly  Palpitations   This is a new problem  The current episode started 1 to 4 weeks ago  The problem occurs intermittently  The problem has been gradually worsening  Pertinent negatives include no abdominal pain, anorexia, arthralgias, change in bowel habit, chest pain, chills, congestion, coughing, diaphoresis, fatigue, fever, headaches, joint swelling, myalgias, nausea, neck pain, numbness, rash, sore throat, swollen glands, urinary symptoms, vertigo, visual change, vomiting or weakness       The following portions of the patient's history were reviewed and updated as appropriate: allergies, current medications, past family history, past medical history, past social history, past surgical history and problem list   Patient Active Problem List   Diagnosis    Abnormal weight gain    Cholelithiasis without obstruction    Chronic constipation    Hypothyroidism due to acquired atrophy of thyroid    Iron deficiency anemia due to chronic blood loss    Pulmonary nodule seen on imaging study    Vitamin D deficiency     Past Medical History:   Diagnosis Date    Asthma     Cholelithiasis     Iron deficiency anemia     Menorrhagia     Palpitations      Past Surgical History:   Procedure Laterality Date    WISDOM TOOTH EXTRACTION       Allergies   Allergen Reactions    Lubiprostone Nausea Only and GI Intolerance     Family History   Problem Relation Age of Onset    Heart failure Mother    Lore De Leon Hypothyroidism Sister      Social History     Social History    Marital status: Legally      Spouse name: N/A    Number of children: 3    Years of education: N/A     Occupational History    homemaker      Social History Main Topics    Smoking status: Former Smoker    Smokeless tobacco: Never Used    Alcohol use Yes      Comment: socially    Drug use: No    Sexual activity: Not on file     Other Topics Concern    Not on file     Social History Narrative    No narrative on file     Current Outpatient Prescriptions on File Prior to Visit   Medication Sig Dispense Refill    Multiple Vitamins-Minerals (MULTIVITAMIN ADULT) TABS Take 1 tablet by mouth       No current facility-administered medications on file prior to visit  Review of Systems   Constitutional: Negative  Negative for chills, diaphoresis, fatigue and fever  HENT: Negative  Negative for congestion and sore throat  Eyes: Negative  Respiratory: Negative  Negative for cough  Cardiovascular: Negative  Negative for chest pain  Gastrointestinal: Negative  Negative for abdominal pain, anorexia, change in bowel habit, nausea and vomiting  Endocrine: Negative  Genitourinary: Negative  Musculoskeletal: Negative  Negative for arthralgias, joint swelling, myalgias and neck pain  Skin: Negative  Negative for rash  Allergic/Immunologic: Negative  Neurological: Negative  Negative for vertigo, weakness, numbness and headaches  Hematological: Negative  Psychiatric/Behavioral: Negative  Objective:  Vitals:    10/11/18 1325   BP: 110/80   BP Location: Left arm   Patient Position: Sitting   Cuff Size: Standard   Pulse: 65   Resp: 14   Temp: 98 1 °F (36 7 °C)   TempSrc: Tympanic   SpO2: 99%   Weight: 68 5 kg (151 lb)   Height: 5' 4" (1 626 m)     Body mass index is 25 92 kg/m²    Wt Readings from Last 3 Encounters:   10/11/18 68 5 kg (151 lb)   09/21/18 69 4 kg (153 lb)   05/15/18 66 2 kg (146 lb)     Temp Readings from Last 3 Encounters:   10/11/18 98 1 °F (36 7 °C) (Tympanic)   09/21/18 99 4 °F (37 4 °C)   05/15/18 98 °F (36 7 °C) (Tympanic)     BP Readings from Last 3 Encounters:   10/11/18 110/80   09/21/18 104/52   05/15/18 112/70     Pulse Readings from Last 3 Encounters:   10/11/18 65   09/21/18 60   05/15/18 60        Physical Exam   Constitutional: She is oriented to person, place, and time  She appears well-developed and well-nourished  HENT:   Head: Normocephalic and atraumatic  Mouth/Throat: No oropharyngeal exudate  Eyes: Pupils are equal, round, and reactive to light  Conjunctivae and EOM are normal    Neck: Normal range of motion  No JVD present  No tracheal deviation present  No thyromegaly present  Cardiovascular: Normal rate, regular rhythm, normal heart sounds and intact distal pulses  Exam reveals no gallop and no friction rub  No murmur heard  Pulmonary/Chest: Effort normal and breath sounds normal  No stridor  No respiratory distress  She has no wheezes  She has no rales  She exhibits no tenderness  Abdominal: Soft  Bowel sounds are normal  She exhibits no distension and no mass  There is no tenderness  There is no rebound and no guarding  Musculoskeletal: Normal range of motion  She exhibits no edema, tenderness or deformity  Lymphadenopathy:     She has no cervical adenopathy  Neurological: She is alert and oriented to person, place, and time  She has normal reflexes  No cranial nerve deficit  She exhibits normal muscle tone  Coordination normal    Skin: Skin is warm and dry  No rash noted  No erythema  No pallor

## 2018-10-17 LAB
ALBUMIN SERPL-MCNC: 4.7 G/DL (ref 3.5–5.5)
ALBUMIN/GLOB SERPL: 2 {RATIO} (ref 1.2–2.2)
ALP SERPL-CCNC: 43 IU/L (ref 39–117)
ALT SERPL-CCNC: 11 IU/L (ref 0–32)
AST SERPL-CCNC: 18 IU/L (ref 0–40)
BASOPHILS # BLD AUTO: 0.1 X10E3/UL (ref 0–0.2)
BASOPHILS NFR BLD AUTO: 1 %
BILIRUB SERPL-MCNC: 0.7 MG/DL (ref 0–1.2)
BUN SERPL-MCNC: 10 MG/DL (ref 6–20)
BUN/CREAT SERPL: 14 (ref 9–23)
CALCIUM SERPL-MCNC: 9.4 MG/DL (ref 8.7–10.2)
CHLORIDE SERPL-SCNC: 103 MMOL/L (ref 96–106)
CO2 SERPL-SCNC: 22 MMOL/L (ref 20–29)
CREAT SERPL-MCNC: 0.72 MG/DL (ref 0.57–1)
EOSINOPHIL # BLD AUTO: 0.1 X10E3/UL (ref 0–0.4)
EOSINOPHIL NFR BLD AUTO: 3 %
ERYTHROCYTE [DISTWIDTH] IN BLOOD BY AUTOMATED COUNT: 13 % (ref 12.3–15.4)
GLOBULIN SER-MCNC: 2.4 G/DL (ref 1.5–4.5)
GLUCOSE SERPL-MCNC: 95 MG/DL (ref 65–99)
HCT VFR BLD AUTO: 41.6 % (ref 34–46.6)
HGB BLD-MCNC: 14.4 G/DL (ref 11.1–15.9)
IMM GRANULOCYTES # BLD: 0 X10E3/UL (ref 0–0.1)
IMM GRANULOCYTES NFR BLD: 0 %
LYMPHOCYTES # BLD AUTO: 1.5 X10E3/UL (ref 0.7–3.1)
LYMPHOCYTES NFR BLD AUTO: 27 %
MAGNESIUM SERPL-MCNC: 1.9 MG/DL (ref 1.6–2.3)
MCH RBC QN AUTO: 33.4 PG (ref 26.6–33)
MCHC RBC AUTO-ENTMCNC: 34.6 G/DL (ref 31.5–35.7)
MCV RBC AUTO: 97 FL (ref 79–97)
MONOCYTES # BLD AUTO: 0.5 X10E3/UL (ref 0.1–0.9)
MONOCYTES NFR BLD AUTO: 8 %
NEUTROPHILS # BLD AUTO: 3.4 X10E3/UL (ref 1.4–7)
NEUTROPHILS NFR BLD AUTO: 61 %
PLATELET # BLD AUTO: 221 X10E3/UL (ref 150–379)
POTASSIUM SERPL-SCNC: 3.9 MMOL/L (ref 3.5–5.2)
PROT SERPL-MCNC: 7.1 G/DL (ref 6–8.5)
RBC # BLD AUTO: 4.31 X10E6/UL (ref 3.77–5.28)
SL AMB EGFR AFRICAN AMERICAN: 127 ML/MIN/1.73
SL AMB EGFR NON AFRICAN AMERICAN: 110 ML/MIN/1.73
SODIUM SERPL-SCNC: 141 MMOL/L (ref 134–144)
T4 FREE SERPL-MCNC: 1.04 NG/DL (ref 0.82–1.77)
TSH SERPL DL<=0.005 MIU/L-ACNC: 2.16 UIU/ML (ref 0.45–4.5)
WBC # BLD AUTO: 5.5 X10E3/UL (ref 3.4–10.8)

## 2018-10-29 ENCOUNTER — HOSPITAL ENCOUNTER (OUTPATIENT)
Dept: NON INVASIVE DIAGNOSTICS | Facility: CLINIC | Age: 33
Discharge: HOME | End: 2018-10-29
Payer: COMMERCIAL

## 2018-10-29 DIAGNOSIS — F41.9 ANXIETY: ICD-10-CM

## 2018-10-29 DIAGNOSIS — R00.2 PALPITATIONS: ICD-10-CM

## 2018-10-29 PROCEDURE — 93226 XTRNL ECG REC<48 HR SCAN A/R: CPT

## 2018-10-29 PROCEDURE — 93225 XTRNL ECG REC<48 HRS REC: CPT

## 2018-11-10 PROCEDURE — 93227 XTRNL ECG REC<48 HR R&I: CPT | Performed by: INTERNAL MEDICINE

## 2019-04-04 ENCOUNTER — OFFICE VISIT (OUTPATIENT)
Dept: URGENT CARE | Facility: CLINIC | Age: 34
End: 2019-04-04
Payer: COMMERCIAL

## 2019-04-04 VITALS
SYSTOLIC BLOOD PRESSURE: 102 MMHG | OXYGEN SATURATION: 98 % | HEIGHT: 64 IN | TEMPERATURE: 99.3 F | WEIGHT: 153 LBS | HEART RATE: 71 BPM | RESPIRATION RATE: 16 BRPM | BODY MASS INDEX: 26.12 KG/M2 | DIASTOLIC BLOOD PRESSURE: 78 MMHG

## 2019-04-04 DIAGNOSIS — L03.116 CELLULITIS OF LEFT LOWER EXTREMITY: Primary | ICD-10-CM

## 2019-04-04 PROCEDURE — G0382 LEV 3 HOSP TYPE B ED VISIT: HCPCS | Performed by: FAMILY MEDICINE

## 2019-04-04 PROCEDURE — 99283 EMERGENCY DEPT VISIT LOW MDM: CPT | Performed by: FAMILY MEDICINE

## 2019-04-04 RX ORDER — CEFADROXIL 500 MG/1
500 CAPSULE ORAL EVERY 12 HOURS SCHEDULED
Qty: 20 CAPSULE | Refills: 0 | Status: SHIPPED | OUTPATIENT
Start: 2019-04-04 | End: 2019-04-14

## 2019-06-17 ENCOUNTER — OFFICE VISIT (OUTPATIENT)
Dept: FAMILY MEDICINE CLINIC | Facility: CLINIC | Age: 34
End: 2019-06-17
Payer: COMMERCIAL

## 2019-06-17 VITALS
BODY MASS INDEX: 25.95 KG/M2 | SYSTOLIC BLOOD PRESSURE: 102 MMHG | TEMPERATURE: 98.6 F | DIASTOLIC BLOOD PRESSURE: 60 MMHG | WEIGHT: 152 LBS | HEIGHT: 64 IN | RESPIRATION RATE: 18 BRPM | HEART RATE: 72 BPM

## 2019-06-17 DIAGNOSIS — B49 FUNGAL INFECTION: ICD-10-CM

## 2019-06-17 DIAGNOSIS — K29.00 ACUTE GASTRITIS WITHOUT HEMORRHAGE, UNSPECIFIED GASTRITIS TYPE: Primary | ICD-10-CM

## 2019-06-17 DIAGNOSIS — R10.13 EPIGASTRIC PAIN: ICD-10-CM

## 2019-06-17 DIAGNOSIS — K80.20 CALCULUS OF GALLBLADDER WITHOUT CHOLECYSTITIS WITHOUT OBSTRUCTION: ICD-10-CM

## 2019-06-17 PROBLEM — F43.10 PTSD (POST-TRAUMATIC STRESS DISORDER): Status: ACTIVE | Noted: 2018-06-20

## 2019-06-17 PROBLEM — Z79.899 MEDICAL MARIJUANA USE: Status: ACTIVE | Noted: 2019-06-17

## 2019-06-17 PROCEDURE — 99213 OFFICE O/P EST LOW 20 MIN: CPT | Performed by: FAMILY MEDICINE

## 2019-06-17 RX ORDER — DRONABINOL 10 MG/1
10 CAPSULE ORAL
COMMUNITY
End: 2022-05-18

## 2019-06-17 RX ORDER — OMEPRAZOLE 40 MG/1
40 CAPSULE, DELAYED RELEASE ORAL
Qty: 30 CAPSULE | Refills: 1 | Status: SHIPPED | OUTPATIENT
Start: 2019-06-17 | End: 2020-02-04 | Stop reason: ALTCHOICE

## 2019-06-17 RX ORDER — NYSTATIN 100000 U/G
OINTMENT TOPICAL 2 TIMES DAILY
Qty: 30 G | Refills: 0 | Status: SHIPPED | OUTPATIENT
Start: 2019-06-17 | End: 2019-07-02 | Stop reason: ALTCHOICE

## 2019-06-25 ENCOUNTER — HOSPITAL ENCOUNTER (OUTPATIENT)
Dept: ULTRASOUND IMAGING | Facility: CLINIC | Age: 34
Discharge: HOME | End: 2019-06-25
Payer: COMMERCIAL

## 2019-06-25 DIAGNOSIS — R10.13 EPIGASTRIC PAIN: ICD-10-CM

## 2019-06-25 DIAGNOSIS — K80.20 CALCULUS OF GALLBLADDER WITHOUT CHOLECYSTITIS WITHOUT OBSTRUCTION: ICD-10-CM

## 2019-06-25 DIAGNOSIS — K29.00 ACUTE GASTRITIS WITHOUT HEMORRHAGE, UNSPECIFIED GASTRITIS TYPE: ICD-10-CM

## 2019-06-25 LAB
ALBUMIN SERPL-MCNC: 4.3 G/DL (ref 3.5–5.5)
ALBUMIN/GLOB SERPL: 1.8 {RATIO} (ref 1.2–2.2)
ALP SERPL-CCNC: 44 IU/L (ref 39–117)
ALT SERPL-CCNC: 13 IU/L (ref 0–32)
AMYLASE SERPL-CCNC: 92 U/L (ref 31–124)
AST SERPL-CCNC: 18 IU/L (ref 0–40)
BASOPHILS # BLD AUTO: 0.1 X10E3/UL (ref 0–0.2)
BASOPHILS NFR BLD AUTO: 1 %
BILIRUB SERPL-MCNC: 0.4 MG/DL (ref 0–1.2)
BUN SERPL-MCNC: 13 MG/DL (ref 6–20)
BUN/CREAT SERPL: 17 (ref 9–23)
CALCIUM SERPL-MCNC: 9.3 MG/DL (ref 8.7–10.2)
CHLORIDE SERPL-SCNC: 106 MMOL/L (ref 96–106)
CO2 SERPL-SCNC: 25 MMOL/L (ref 20–29)
CREAT SERPL-MCNC: 0.78 MG/DL (ref 0.57–1)
EOSINOPHIL # BLD AUTO: 0.1 X10E3/UL (ref 0–0.4)
EOSINOPHIL NFR BLD AUTO: 2 %
ERYTHROCYTE [DISTWIDTH] IN BLOOD BY AUTOMATED COUNT: 13.1 % (ref 12.3–15.4)
GLOBULIN SER-MCNC: 2.4 G/DL (ref 1.5–4.5)
GLUCOSE SERPL-MCNC: 88 MG/DL (ref 65–99)
HCT VFR BLD AUTO: 39.5 % (ref 34–46.6)
HGB BLD-MCNC: 13.1 G/DL (ref 11.1–15.9)
IMM GRANULOCYTES # BLD: 0 X10E3/UL (ref 0–0.1)
IMM GRANULOCYTES NFR BLD: 0 %
LIPASE SERPL-CCNC: 52 U/L (ref 14–72)
LYMPHOCYTES # BLD AUTO: 1.4 X10E3/UL (ref 0.7–3.1)
LYMPHOCYTES NFR BLD AUTO: 33 %
MCH RBC QN AUTO: 32.3 PG (ref 26.6–33)
MCHC RBC AUTO-ENTMCNC: 33.2 G/DL (ref 31.5–35.7)
MCV RBC AUTO: 98 FL (ref 79–97)
MONOCYTES # BLD AUTO: 0.5 X10E3/UL (ref 0.1–0.9)
MONOCYTES NFR BLD AUTO: 10 %
NEUTROPHILS # BLD AUTO: 2.3 X10E3/UL (ref 1.4–7)
NEUTROPHILS NFR BLD AUTO: 54 %
PLATELET # BLD AUTO: 250 X10E3/UL (ref 150–450)
POTASSIUM SERPL-SCNC: 4.8 MMOL/L (ref 3.5–5.2)
PROT SERPL-MCNC: 6.7 G/DL (ref 6–8.5)
RBC # BLD AUTO: 4.05 X10E6/UL (ref 3.77–5.28)
SL AMB EGFR AFRICAN AMERICAN: 115 ML/MIN/1.73
SL AMB EGFR NON AFRICAN AMERICAN: 99 ML/MIN/1.73
SODIUM SERPL-SCNC: 142 MMOL/L (ref 134–144)
WBC # BLD AUTO: 4.4 X10E3/UL (ref 3.4–10.8)

## 2019-06-25 PROCEDURE — 76700 US EXAM ABDOM COMPLETE: CPT

## 2019-07-02 ENCOUNTER — OFFICE VISIT (OUTPATIENT)
Dept: FAMILY MEDICINE CLINIC | Facility: CLINIC | Age: 34
End: 2019-07-02
Payer: COMMERCIAL

## 2019-07-02 VITALS
HEIGHT: 64 IN | SYSTOLIC BLOOD PRESSURE: 98 MMHG | BODY MASS INDEX: 26.32 KG/M2 | DIASTOLIC BLOOD PRESSURE: 58 MMHG | TEMPERATURE: 98.7 F | OXYGEN SATURATION: 98 % | WEIGHT: 154.2 LBS | HEART RATE: 62 BPM
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
## 2019-07-02 DIAGNOSIS — S46.919A: ICD-10-CM

## 2019-07-02 DIAGNOSIS — K80.20 CALCULUS OF GALLBLADDER WITHOUT CHOLECYSTITIS WITHOUT OBSTRUCTION: ICD-10-CM

## 2019-07-02 DIAGNOSIS — S16.1XXA NECK STRAIN, INITIAL ENCOUNTER: ICD-10-CM

## 2019-07-02 DIAGNOSIS — M94.0 COSTOCHONDRITIS: ICD-10-CM

## 2019-07-02 DIAGNOSIS — K29.00 ACUTE GASTRITIS WITHOUT HEMORRHAGE, UNSPECIFIED GASTRITIS TYPE: Primary | ICD-10-CM

## 2019-07-02 PROCEDURE — 1036F TOBACCO NON-USER: CPT | Performed by: FAMILY MEDICINE

## 2019-07-02 PROCEDURE — 99213 OFFICE O/P EST LOW 20 MIN: CPT | Performed by: FAMILY MEDICINE

## 2019-07-02 PROCEDURE — 3008F BODY MASS INDEX DOCD: CPT | Performed by: FAMILY MEDICINE

## 2019-07-02 NOTE — PATIENT INSTRUCTIONS
Costochondritis   WHAT YOU NEED TO KNOW:   Costochondritis is a condition that causes pain in the cartilage that connect your ribs to your sternum (breastbone)  Cartilage is the tough, bendable tissue that protects your bones  DISCHARGE INSTRUCTIONS:   Medicines:   · Acetaminophen: This medicine decreases pain  Acetaminophen is available without a doctor's order  Ask how much to take and how often to take it  Follow directions  Acetaminophen can cause liver damage if not taken correctly  · NSAIDs , such as ibuprofen, help decrease swelling, pain, and fever  This medicine is available with or without a doctor's order  NSAIDs can cause stomach bleeding or kidney problems in certain people  If you take blood thinner medicine, always ask if NSAIDs are safe for you  Always read the medicine label and follow directions  Do not give these medicines to children under 10months of age without direction from your child's healthcare provider  · Take your medicine as directed  Contact your healthcare provider if you think your medicine is not helping or if you have side effects  Tell him of her if you are allergic to any medicine  Keep a list of the medicines, vitamins, and herbs you take  Include the amounts, and when and why you take them  Bring the list or the pill bottles to follow-up visits  Carry your medicine list with you in case of an emergency  Follow up with your healthcare provider as directed:  Write down your questions so you remember to ask them during your visits  Rest:  You may need to get more rest  Learn which movements and activities cause pain, and avoid doing them  Do not carry objects, such as a purse or backpack, if this is painful  Avoid activities such as rowing and weightlifting until your pain decreases or goes away  Ask which activities are best for you to do while you recover  Heat:  Heat helps decrease pain in some patients   Apply heat on the area for 20 to 30 minutes every 2 hours for as many days as directed  Ice:  Ice helps decrease swelling and pain  Ice may also help prevent tissue damage  Use an ice pack, or put crushed ice in a plastic bag  Cover it with a towel and place it on the painful area for 15 to 20 minutes every hour or as directed  Stretching exercises:  Gentle stretching may help your symptoms   a doorway and put your hands on the door frame at the level of your ears or shoulders  Take 1 step forward and gently stretch your chest  Try this with your hands higher up on the doorway  Contact your healthcare provider if:   · You have a fever  · The painful areas of your chest look swollen, red, and feel warm to the touch  · You cannot sleep because of the pain  · You have questions or concerns about your condition or care  © 2017 [employer] [employer]  Information is for End User's use only and may not be sold, redistributed or otherwise used for commercial purposes  All illustrations and images included in CareNotes® are the copyrighted property of [employer]  or [employer]  The above information is an  only  It is not intended as medical advice for individual conditions or treatments  Talk to your doctor, nurse or pharmacist before following any medical regimen to see if it is safe and effective for you

## 2019-07-02 NOTE — PROGRESS NOTES
Assessment/Plan:  1  Acute gastritis-the patient's symptoms have improved with taking the omeprazole  She is not experiencing any more epigastric pain  She will finish out the course of the omeprazole and will continue to follow a GERD diet  She will call with any worsening pain, nausea, vomiting  2  Costochondritis-I believe that the patient's chest pains are related to costochondritis from all of the box lifting that she was doing  I advised her this will continue to improve  She can take Tylenol as needed for the pain and also try to rest and limit her heavy lifting for the time being  She will call with any worsening symptoms  3  Calculus of the gallbladder-patient still has evidence of the lone gallstone in her gallbladder  There is no evidence of cholecystitis  Patient is not having any symptoms at present  We will continue to monitor this and she will call immediately or follow up in the ER with any worsening abdominal pain, nausea, vomiting, or fever  4  Repetitive strain of the patient's neck and shoulders-we will refer her to physical therapy as ordered  She can continue with warm moist heat to her neck and shoulders and stretching  We will see her back as scheduled  Diagnoses and all orders for this visit:    Acute gastritis without hemorrhage, unspecified gastritis type    Costochondritis  -     Ambulatory referral to Physical Therapy; Future    Calculus of gallbladder without cholecystitis without obstruction    Neck strain, initial encounter  -     Ambulatory referral to Physical Therapy; Future    Repetitive strain injury of shoulder, unspecified laterality, initial encounter  -     Ambulatory referral to Physical Therapy; Future       Return if symptoms worsen or fail to improve       Subjective:   Chief Complaint   Patient presents with    Follow-up     2 week follow up to discuss labs         Patient ID: Kaylin Posey is a 29 y o  female presents today for a 2 week follow-up of acute gastritis  Tono Desai is a 29 y o  female who presents today for follow-up from her recent visit on 6/17/2018 where we treated her for acute gastritis and a history of a gallbladder calculus  We had started her on omeprazole 40 mg daily to treat her symptoms  In addition, we send her for routine lab work and an abdominal ultrasound  The lab work was all unremarkable  The ultrasound demonstrates cholelithiasis without evidence of acute cholecystitis  She is here today for follow-up  The patient is still feeling some tightness in her chest with certain movements- it started after moving boxes  She has this on and off  She tries heat and cold with relief  There is no nausea or vomiting  The epigastric pain has subsided  There is no belching or sour taste  There was some relief with the omeprazole with the discomfort  There is no blood in the stool or black tarry stool  Abdominal Pain   This is a recurrent problem  The current episode started 1 to 4 weeks ago  The onset quality is sudden  The problem occurs intermittently  The problem has been gradually improving  The pain is located in the epigastric region  The pain is at a severity of 5/10  The quality of the pain is aching and burning  Pertinent negatives include no anorexia, arthralgias, belching, constipation, diarrhea, dysuria, fever, flatus, frequency, headaches, hematochezia, hematuria, melena, myalgias, nausea, vomiting or weight loss  She has tried proton pump inhibitors for the symptoms  The treatment provided moderate relief       The following portions of the patient's history were reviewed and updated as appropriate: allergies, current medications, past family history, past medical history, past social history, past surgical history and problem list   Patient Active Problem List   Diagnosis    Abnormal weight gain    Cholelithiasis without obstruction    Chronic constipation    Hypothyroidism due to acquired atrophy of thyroid    Iron deficiency anemia due to chronic blood loss    Pulmonary nodule seen on imaging study    Vitamin D deficiency    PTSD (post-traumatic stress disorder)    Medical marijuana use     Past Medical History:   Diagnosis Date    Asthma     Cholelithiasis     Iron deficiency anemia     Menorrhagia     Palpitations      Past Surgical History:   Procedure Laterality Date    WISDOM TOOTH EXTRACTION       Allergies   Allergen Reactions    Lubiprostone Nausea Only and GI Intolerance     Family History   Problem Relation Age of Onset    Heart failure Mother     Hypothyroidism Sister      Social History     Socioeconomic History    Marital status: Legally      Spouse name: Not on file    Number of children: 3    Years of education: Not on file    Highest education level: Not on file   Occupational History    Occupation: homemaker   Social Needs    Financial resource strain: Not on file    Food insecurity:     Worry: Not on file     Inability: Not on file    Transportation needs:     Medical: Not on file     Non-medical: Not on file   Tobacco Use    Smoking status: Former Smoker    Smokeless tobacco: Never Used   Substance and Sexual Activity    Alcohol use: Yes     Comment: socially    Drug use: No    Sexual activity: Not on file   Lifestyle    Physical activity:     Days per week: Not on file     Minutes per session: Not on file    Stress: Not on file   Relationships    Social connections:     Talks on phone: Not on file     Gets together: Not on file     Attends Latter day service: Not on file     Active member of club or organization: Not on file     Attends meetings of clubs or organizations: Not on file     Relationship status: Not on file    Intimate partner violence:     Fear of current or ex partner: Not on file     Emotionally abused: Not on file     Physically abused: Not on file     Forced sexual activity: Not on file   Other Topics Concern    Not on file Social History Narrative    Not on file     Current Outpatient Medications on File Prior to Visit   Medication Sig Dispense Refill    dronabinol (MARINOL) 10 MG capsule Take 10 mg by mouth 2 (two) times a day before meals      Multiple Vitamins-Minerals (MULTIVITAMIN ADULT) TABS Take 1 tablet by mouth      omeprazole (PriLOSEC) 40 MG capsule Take 1 capsule (40 mg total) by mouth daily before breakfast 30 capsule 1     No current facility-administered medications on file prior to visit  Review of Systems   Constitutional: Negative  Negative for fever and weight loss  HENT: Negative  Eyes: Negative  Respiratory: Negative  Cardiovascular: Negative  Gastrointestinal: Positive for abdominal pain  Negative for anorexia, constipation, diarrhea, flatus, hematochezia, melena, nausea and vomiting  Endocrine: Negative  Genitourinary: Negative  Negative for dysuria, frequency and hematuria  Musculoskeletal: Negative  Negative for arthralgias and myalgias  Skin: Negative  Allergic/Immunologic: Negative  Neurological: Negative  Negative for headaches  Hematological: Negative  Psychiatric/Behavioral: Negative  Objective:  Vitals:    07/02/19 1437   BP: 98/58   BP Location: Left arm   Patient Position: Sitting   Cuff Size: Standard   Pulse: 62   Temp: 98 7 °F (37 1 °C)   TempSrc: Tympanic   SpO2: 98%   Weight: 69 9 kg (154 lb 3 2 oz)   Height: 5' 4" (1 626 m)     Body mass index is 26 47 kg/m²  Physical Exam   Constitutional: She is oriented to person, place, and time  She appears well-developed and well-nourished  HENT:   Head: Normocephalic and atraumatic  Mouth/Throat: No oropharyngeal exudate  Eyes: Pupils are equal, round, and reactive to light  Conjunctivae and EOM are normal    Neck: Normal range of motion  No JVD present  No tracheal deviation present  No thyromegaly present     Cardiovascular: Normal rate, regular rhythm, normal heart sounds and intact distal pulses  Exam reveals no gallop and no friction rub  No murmur heard  Pulmonary/Chest: Effort normal and breath sounds normal  No stridor  No respiratory distress  She has no wheezes  She has no rales  She exhibits no tenderness  Abdominal: Soft  Bowel sounds are normal  She exhibits no distension and no mass  There is no tenderness  There is no rebound and no guarding  Musculoskeletal: She exhibits tenderness  She exhibits no edema or deformity  Right shoulder: She exhibits decreased range of motion, tenderness and spasm  She exhibits no bony tenderness, no swelling, no effusion, no crepitus, no deformity, no laceration, no pain, normal pulse and normal strength  Left shoulder: She exhibits decreased range of motion, tenderness and spasm  She exhibits no bony tenderness, no swelling, no effusion, no crepitus, no deformity, no laceration, no pain, normal pulse and normal strength  Cervical back: She exhibits decreased range of motion, tenderness, pain and spasm  She exhibits no bony tenderness, no swelling, no edema, no deformity, no laceration and normal pulse  Lymphadenopathy:     She has no cervical adenopathy  Neurological: She is alert and oriented to person, place, and time  She has normal reflexes  She displays normal reflexes  No cranial nerve deficit  She exhibits normal muscle tone  Coordination normal    Skin: Skin is warm and dry         Office Visit on 06/17/2019   Component Date Value    White Blood Cell Count 06/25/2019 4 4     Red Blood Cell Count 06/25/2019 4 05     Hemoglobin 06/25/2019 13 1     HCT 06/25/2019 39 5     MCV 06/25/2019 98*    MCH 06/25/2019 32 3     MCHC 06/25/2019 33 2     RDW 06/25/2019 13 1     Platelet Count 77/64/0724 250     Neutrophils 06/25/2019 54     Lymphocytes 06/25/2019 33     Monocytes 06/25/2019 10     Eosinophils 06/25/2019 2     Basophils PCT 06/25/2019 1     Neutrophils (Absolute) 06/25/2019 2 3     Lymphocytes (Absolute) 06/25/2019 1 4     Monocytes (Absolute) 06/25/2019 0 5     Eosinophils (Absolute) 06/25/2019 0 1     Basophils ABS 06/25/2019 0 1     Immature Granulocytes 06/25/2019 0     Immature Granulocytes (A* 06/25/2019 0 0     Glucose, Random 06/25/2019 88     BUN 06/25/2019 13     Creatinine 06/25/2019 0 78     eGFR Non African American 06/25/2019 99     eGFR  06/25/2019 115     SL AMB BUN/CREATININE RA* 06/25/2019 17     Sodium 06/25/2019 142     Potassium 06/25/2019 4 8     Chloride 06/25/2019 106     CO2 06/25/2019 25     CALCIUM 06/25/2019 9 3     Protein, Total 06/25/2019 6 7     Albumin 06/25/2019 4 3     Globulin, Total 06/25/2019 2 4     Albumin/Globulin Ratio 06/25/2019 1 8     TOTAL BILIRUBIN 06/25/2019 0 4     Alk Phos Isoenzymes 06/25/2019 44     AST 06/25/2019 18     ALT 06/25/2019 13     Amylase, Serum 06/25/2019 92     Lipase, Serum 06/25/2019 52       ABDOMEN ULTRASOUND, COMPLETE      INDICATION:   K29 00: Acute gastritis without bleeding  R10 13: Epigastric pain  K80 20: Calculus of gallbladder without cholecystitis without obstruction      COMPARISON: September 14, 2017     TECHNIQUE:   Real-time ultrasound of the abdomen was performed with a curvilinear transducer with both volumetric sweeps and still imaging techniques      FINDINGS:     PANCREAS:  Portions of the pancreas are obscured by bowel gas  Visualized portions of the pancreas are unremarkable       AORTA AND IVC:  Visualized portions are normal for patient age      LIVER:  Size:  Within normal range  The liver measures 14 cm in the midclavicular line  Contour:  Surface contour is smooth  Parenchyma:  Echogenicity and echotexture are within normal limits  No evidence of suspicious mass  Limited imaging of the main portal vein shows it to be patent and hepatopetal      BILIARY:  The gallbladder is normal in caliber  No wall thickening or pericholecystic fluid    There is a single dependent calculus without sludge  No sonographic Hussein sign  No intrahepatic biliary dilatation  CBD measures 1 mm  No choledocholithiasis      KIDNEY:   Right kidney measures 11 4 x 4 1 cm  Within normal limits      Left kidney measures 10 9 x 4 8 cm  Within normal limits      SPLEEN:   Measures 11 3 x 11 x 3 2 cm  Within normal limits      ASCITES:  None      IMPRESSION:     Cholelithiasis without sonographic evidence of acute cholecystitis

## 2020-01-27 ENCOUNTER — HOSPITAL ENCOUNTER (EMERGENCY)
Facility: HOSPITAL | Age: 35
Discharge: HOME | End: 2020-01-27
Attending: EMERGENCY MEDICINE | Admitting: EMERGENCY MEDICINE
Payer: COMMERCIAL

## 2020-01-27 VITALS
SYSTOLIC BLOOD PRESSURE: 125 MMHG | TEMPERATURE: 98.1 F | WEIGHT: 162.2 LBS | HEIGHT: 64 IN | BODY MASS INDEX: 27.69 KG/M2 | RESPIRATION RATE: 18 BRPM | DIASTOLIC BLOOD PRESSURE: 79 MMHG | OXYGEN SATURATION: 100 % | HEART RATE: 65 BPM

## 2020-01-27 DIAGNOSIS — R14.0 ABDOMINAL BLOATING: Primary | ICD-10-CM

## 2020-01-27 LAB
ALBUMIN SERPL BCP-MCNC: 4.1 G/DL (ref 3.5–5)
ALP SERPL-CCNC: 52 U/L (ref 46–116)
ALT SERPL W P-5'-P-CCNC: 16 U/L (ref 12–78)
ANION GAP SERPL CALCULATED.3IONS-SCNC: 9 MMOL/L (ref 4–13)
AST SERPL W P-5'-P-CCNC: 19 U/L (ref 5–45)
BASOPHILS # BLD AUTO: 0.05 THOUSANDS/ΜL (ref 0–0.1)
BASOPHILS NFR BLD AUTO: 1 % (ref 0–1)
BILIRUB SERPL-MCNC: 0.8 MG/DL (ref 0.2–1)
BUN SERPL-MCNC: 10 MG/DL (ref 5–25)
CALCIUM SERPL-MCNC: 9 MG/DL (ref 8.3–10.1)
CHLORIDE SERPL-SCNC: 105 MMOL/L (ref 100–108)
CLARITY, POC: CLEAR
CO2 SERPL-SCNC: 27 MMOL/L (ref 21–32)
COLOR, POC: YELLOW
CREAT SERPL-MCNC: 0.6 MG/DL (ref 0.6–1.3)
EOSINOPHIL # BLD AUTO: 0.11 THOUSAND/ΜL (ref 0–0.61)
EOSINOPHIL NFR BLD AUTO: 2 % (ref 0–6)
ERYTHROCYTE [DISTWIDTH] IN BLOOD BY AUTOMATED COUNT: 12.2 % (ref 11.6–15.1)
EXT BILIRUBIN, UA: NORMAL
EXT BLOOD URINE: NORMAL
EXT GLUCOSE, UA: NORMAL
EXT KETONES: 15
EXT NITRITE, UA: NORMAL
EXT PH, UA: 7
EXT PREG TEST URINE: NEGATIVE
EXT PROTEIN, UA: NORMAL
EXT SPECIFIC GRAVITY, UA: 1.01
EXT UROBILINOGEN: 0.2
EXT. CONTROL ED NAV: NORMAL
GFR SERPL CREATININE-BSD FRML MDRD: 119 ML/MIN/1.73SQ M
GLUCOSE SERPL-MCNC: 91 MG/DL (ref 65–140)
HCT VFR BLD AUTO: 39.6 % (ref 34.8–46.1)
HGB BLD-MCNC: 13.4 G/DL (ref 11.5–15.4)
IMM GRANULOCYTES # BLD AUTO: 0.02 THOUSAND/UL (ref 0–0.2)
IMM GRANULOCYTES NFR BLD AUTO: 0 % (ref 0–2)
LIPASE SERPL-CCNC: 128 U/L (ref 73–393)
LYMPHOCYTES # BLD AUTO: 1.91 THOUSANDS/ΜL (ref 0.6–4.47)
LYMPHOCYTES NFR BLD AUTO: 31 % (ref 14–44)
MCH RBC QN AUTO: 32.5 PG (ref 26.8–34.3)
MCHC RBC AUTO-ENTMCNC: 33.8 G/DL (ref 31.4–37.4)
MCV RBC AUTO: 96 FL (ref 82–98)
MONOCYTES # BLD AUTO: 0.48 THOUSAND/ΜL (ref 0.17–1.22)
MONOCYTES NFR BLD AUTO: 8 % (ref 4–12)
NEUTROPHILS # BLD AUTO: 3.67 THOUSANDS/ΜL (ref 1.85–7.62)
NEUTS SEG NFR BLD AUTO: 58 % (ref 43–75)
NRBC BLD AUTO-RTO: 0 /100 WBCS
PLATELET # BLD AUTO: 236 THOUSANDS/UL (ref 149–390)
PMV BLD AUTO: 10.6 FL (ref 8.9–12.7)
POTASSIUM SERPL-SCNC: 3.4 MMOL/L (ref 3.5–5.3)
PROT SERPL-MCNC: 7.9 G/DL (ref 6.4–8.2)
RBC # BLD AUTO: 4.12 MILLION/UL (ref 3.81–5.12)
SODIUM SERPL-SCNC: 141 MMOL/L (ref 136–145)
WBC # BLD AUTO: 6.24 THOUSAND/UL (ref 4.31–10.16)
WBC # BLD EST: NORMAL 10*3/UL

## 2020-01-27 PROCEDURE — 83690 ASSAY OF LIPASE: CPT | Performed by: EMERGENCY MEDICINE

## 2020-01-27 PROCEDURE — 99284 EMERGENCY DEPT VISIT MOD MDM: CPT | Performed by: EMERGENCY MEDICINE

## 2020-01-27 PROCEDURE — 99284 EMERGENCY DEPT VISIT MOD MDM: CPT

## 2020-01-27 PROCEDURE — 85025 COMPLETE CBC W/AUTO DIFF WBC: CPT | Performed by: EMERGENCY MEDICINE

## 2020-01-27 PROCEDURE — 80053 COMPREHEN METABOLIC PANEL: CPT | Performed by: EMERGENCY MEDICINE

## 2020-01-27 PROCEDURE — 36415 COLL VENOUS BLD VENIPUNCTURE: CPT | Performed by: EMERGENCY MEDICINE

## 2020-01-27 PROCEDURE — 81025 URINE PREGNANCY TEST: CPT | Performed by: EMERGENCY MEDICINE

## 2020-01-27 RX ORDER — DICYCLOMINE HCL 20 MG
20 TABLET ORAL 4 TIMES DAILY PRN
Qty: 12 TABLET | Refills: 0 | Status: SHIPPED | OUTPATIENT
Start: 2020-01-27 | End: 2020-02-04 | Stop reason: ALTCHOICE

## 2020-01-27 NOTE — ED PROVIDER NOTES
History  Chief Complaint   Patient presents with    Abdominal Pain     Patient reports chronic constipation  Took mag citrate tues last week  mag successful but began with gernalized abdominal pain and tenderness  eating exacerbates pain  nothin relieves pain      This is a 28-year-old female with chronic constipation IBS with concerns for an exacerbation last week  She took magnesium citrate and had loose stools and now gurgling in a large amount of bowel sounds  She feels bloated  She does take an herbal supplement for constipation that does not seem to be helping  She also uses medical marijuana  She admits to large amount of stress  She had a low-grade temperature but now bad food or sick contacts  I spoke with her about Bentyl she has not taken that in the past   She currently does not have a GI doctor  I suggested to try it for loose stools sparingly  Prior to Admission Medications   Prescriptions Last Dose Informant Patient Reported? Taking? Multiple Vitamins-Minerals (MULTIVITAMIN ADULT) TABS  Self Yes No   Sig: Take 1 tablet by mouth   dronabinol (MARINOL) 10 MG capsule  Self Yes No   Sig: Take 10 mg by mouth 2 (two) times a day before meals   omeprazole (PriLOSEC) 40 MG capsule Not Taking at Unknown time Self No No   Sig: Take 1 capsule (40 mg total) by mouth daily before breakfast   Patient not taking: Reported on 1/27/2020      Facility-Administered Medications: None       Past Medical History:   Diagnosis Date    Cholelithiasis     IBS (irritable bowel syndrome)     Iron deficiency anemia     Menorrhagia     Palpitations        Past Surgical History:   Procedure Laterality Date    WISDOM TOOTH EXTRACTION         Family History   Problem Relation Age of Onset    Heart failure Mother     Hypothyroidism Sister      I have reviewed and agree with the history as documented      Social History     Tobacco Use    Smoking status: Former Smoker    Smokeless tobacco: Never Used Substance Use Topics    Alcohol use: Yes     Comment: socially    Drug use: No        Review of Systems   All other systems reviewed and are negative  Physical Exam  Physical Exam   Constitutional: She appears well-developed and well-nourished  HENT:   Mouth/Throat: Oropharynx is clear and moist    Eyes: Pupils are equal, round, and reactive to light  Conjunctivae and EOM are normal    Neck: Normal range of motion  Neck supple  No spinous process tenderness present  Cardiovascular: Normal rate, regular rhythm, normal heart sounds and intact distal pulses  Pulmonary/Chest: Effort normal and breath sounds normal  No respiratory distress  She has no wheezes  Abdominal: Soft  She exhibits distension  She exhibits no fluid wave  Bowel sounds are increased  There is no tenderness  Musculoskeletal: Normal range of motion  Neurological: She is alert  She has normal strength  No sensory deficit  GCS eye subscore is 4  GCS verbal subscore is 5  GCS motor subscore is 6  Skin: Skin is warm and dry  No rash noted  Psychiatric: She has a normal mood and affect  Nursing note and vitals reviewed        Vital Signs  ED Triage Vitals [01/27/20 1154]   Temperature Pulse Respirations Blood Pressure SpO2   98 1 °F (36 7 °C) 65 18 125/79 100 %      Temp Source Heart Rate Source Patient Position - Orthostatic VS BP Location FiO2 (%)   Temporal Monitor Sitting Left arm --      Pain Score       3           Vitals:    01/27/20 1154   BP: 125/79   Pulse: 65   Patient Position - Orthostatic VS: Sitting         Visual Acuity      ED Medications  Medications - No data to display    Diagnostic Studies  Results Reviewed     Procedure Component Value Units Date/Time    Comprehensive metabolic panel [[id number]]  (Abnormal) Collected:  01/27/20 1405    Lab Status:  Final result Specimen:  Blood from Arm, Left Updated:  01/27/20 1426     Sodium 141 mmol/L      Potassium 3 4 mmol/L      Chloride 105 mmol/L      CO2 27 mmol/L ANION GAP 9 mmol/L      BUN 10 mg/dL      Creatinine 0 60 mg/dL      Glucose 91 mg/dL      Calcium 9 0 mg/dL      AST 19 U/L      ALT 16 U/L      Alkaline Phosphatase 52 U/L      Total Protein 7 9 g/dL      Albumin 4 1 g/dL      Total Bilirubin 0 80 mg/dL      eGFR 119 ml/min/1 73sq m     Narrative:       National Kidney Disease Foundation guidelines for Chronic Kidney Disease (CKD):     Stage 1 with normal or high GFR (GFR > 90 mL/min/1 73 square meters)    Stage 2 Mild CKD (GFR = 60-89 mL/min/1 73 square meters)    Stage 3A Moderate CKD (GFR = 45-59 mL/min/1 73 square meters)    Stage 3B Moderate CKD (GFR = 30-44 mL/min/1 73 square meters)    Stage 4 Severe CKD (GFR = 15-29 mL/min/1 73 square meters)    Stage 5 End Stage CKD (GFR <15 mL/min/1 73 square meters)  Note: GFR calculation is accurate only with a steady state creatinine    Lipase [[id number]]  (Normal) Collected:  01/27/20 1405    Lab Status:  Final result Specimen:  Blood from Arm, Left Updated:  01/27/20 1426     Lipase 128 u/L     CBC and differential [[id number]] Collected:  01/27/20 1405    Lab Status:  Final result Specimen:  Blood from Arm, Left Updated:  01/27/20 1409     WBC 6 24 Thousand/uL      RBC 4 12 Million/uL      Hemoglobin 13 4 g/dL      Hematocrit 39 6 %      MCV 96 fL      MCH 32 5 pg      MCHC 33 8 g/dL      RDW 12 2 %      MPV 10 6 fL      Platelets 230 Thousands/uL      nRBC 0 /100 WBCs      Neutrophils Relative 58 %      Immat GRANS % 0 %      Lymphocytes Relative 31 %      Monocytes Relative 8 %      Eosinophils Relative 2 %      Basophils Relative 1 %      Neutrophils Absolute 3 67 Thousands/µL      Immature Grans Absolute 0 02 Thousand/uL      Lymphocytes Absolute 1 91 Thousands/µL      Monocytes Absolute 0 48 Thousand/µL      Eosinophils Absolute 0 11 Thousand/µL      Basophils Absolute 0 05 Thousands/µL     POCT urinalysis dipstick [[id number]]  (Normal) Resulted:  01/27/20 1407    Lab Status:  Final result Updated: 01/27/20 1408     Color, UA yellow     Clarity, UA clear     Glucose, UA (Ref: Negative) neg     Bilirubin, UA (Ref: Negative) neg     Ketones, UA (Ref: Negative) 15     Spec Grav, UA (Ref:1 003-1 030) 1 015     Blood, UA (Ref: Negative) mod     pH, UA (Ref: 4 5-8 0) 7     Protein, UA (Ref: Negative) neg     Urobilinogen, UA (Ref: 0 2- 1 0) 0 2      Leukocytes, UA (Ref: Negative) neg     Nitrite, UA (Ref: Negative) neg    POCT pregnancy, urine [[id number]]  (Normal) Resulted:  01/27/20 1407    Lab Status:  Final result Specimen:  Urine Updated:  01/27/20 1407     EXT PREG TEST UR (Ref: Negative) negative     Control valid                 No orders to display              Procedures  Procedures         ED Course                               MDM  Number of Diagnoses or Management Options  Abdominal bloating: established and worsening     Amount and/or Complexity of Data Reviewed  Clinical lab tests: ordered and reviewed  Obtain history from someone other than the patient: yes    Patient Progress  Patient progress: improved        Disposition  Final diagnoses:   Abdominal bloating     Time reflects when diagnosis was documented in both MDM as applicable and the Disposition within this note     Time User Action Codes Description Comment    1/27/2020  2:59 PM Fransico Rojas Add [R14 0] Abdominal bloating       ED Disposition     ED Disposition Condition Date/Time Comment    Discharge Stable Mon Jan 27, 2020  2:59 PM Cyeira Interrante discharge to home/self care              Follow-up Information     Follow up With Specialties Details Why Contact Info Additional Information    [facility], [facility] Call  As needed [phone] [phone]  [facility] Solve[address] [place] [id number] [place] [address], [id number] [phone]          Discharge Medication List as of 1/27/2020  3:01 PM START taking these medications    Details   dicyclomine (BENTYL) 20 mg tablet Take 1 tablet (20 mg total) by mouth 4 (four) times a day as needed (abdominal cramps/ bloating, WITH LOOSE STOOL) for up to 7 days, Starting Mon 1/27/2020, Until Mon 2/3/2020, Normal         CONTINUE these medications which have NOT CHANGED    Details   dronabinol (MARINOL) 10 MG capsule Take 10 mg by mouth 2 (two) times a day before meals, Historical Med      Multiple Vitamins-Minerals (MULTIVITAMIN ADULT) TABS Take 1 tablet by mouth, Historical Med      omeprazole (PriLOSEC) 40 MG capsule Take 1 capsule (40 mg total) by mouth daily before breakfast, Starting Mon 6/17/2019, Normal           No discharge procedures on file      ED Provider  Electronically Signed by           Steven Calzada DO  01/28/20 2204

## 2020-02-04 ENCOUNTER — OFFICE VISIT (OUTPATIENT)
Dept: FAMILY MEDICINE CLINIC | Facility: CLINIC | Age: 35
End: 2020-02-04
Payer: COMMERCIAL

## 2020-02-04 VITALS
RESPIRATION RATE: 12 BRPM | HEIGHT: 64 IN | HEART RATE: 72 BPM | DIASTOLIC BLOOD PRESSURE: 70 MMHG | TEMPERATURE: 100.4 F | SYSTOLIC BLOOD PRESSURE: 102 MMHG | WEIGHT: 161 LBS | BODY MASS INDEX: 27.49 KG/M2

## 2020-02-04 DIAGNOSIS — K59.09 CHRONIC CONSTIPATION: Primary | ICD-10-CM

## 2020-02-04 PROCEDURE — 1036F TOBACCO NON-USER: CPT | Performed by: FAMILY MEDICINE

## 2020-02-04 PROCEDURE — 3008F BODY MASS INDEX DOCD: CPT | Performed by: FAMILY MEDICINE

## 2020-02-04 PROCEDURE — 99213 OFFICE O/P EST LOW 20 MIN: CPT | Performed by: FAMILY MEDICINE

## 2020-02-04 NOTE — PROGRESS NOTES
Assessment/Plan:  Chronic constipation-I advised the patient to schedule appointment with [facility] in the very near future as recommended by the emergency room department  She should continue with her fiber supplementation and increase her water intake  She should also continue with her probiotics  We will monitor her very closely  She is going to ultimately needed colonoscopy so she will schedule appoint with GI as recommended  She will call with any worsening pain, fever, nausea, or vomiting  We will see her back as needed  Diagnoses and all orders for this visit:    Chronic constipation  -     Ambulatory referral to Gastroenterology; Future       No follow-ups on file  Subjective:   Chief Complaint   Patient presents with    Follow-up     Seen in [facility] ER 01/27/20 for abdominal pain         Patient ID: Joni Wooten is a 28 y o  female presents today for evaluation of abdominal pain  Joni Wooten is a 28 y o  female who presents today for follow-up from recent emergency room visit at [address] [place] [address] on 1/27/2020 for abdominal pain  The patient has a history of chronic constipation  She presented to the ER with exacerbation of her constipation  She had taken magnesium citrate prior to arrival to the emergency room and then experienced loose stools and increased gurgling  She also had increased bloating  She was taking an herbal supplement for constipation which seem to be helping  She also currently uses medical marijuana  She currently does not have a GI specialist   She underwent evaluation in the emergency room increased bleeding lab work which was essentially unremarkable  She was given a prescription for Bentyl to take 20 mg 4 times a day as needed and was referred to [facility] for further evaluation  She is here today for follow-up  Prior to going to the ER, she had constipation issues for 2 weeks related to stress and anxiety      She states she never tried the Bentyl  She has been regular since then  She has not followed GI yet  She was concerned about the prep for a colonoscopy  She saw GI over a year ago and never followed up with GI  There are no fevers  She is just having gurgling  There is no nausea or vomiting  There is no heartburn  There is a mild scratchy throat  There is no relation to foods  Stress makes it worse  There is a normal appeitite  Constipation   This is a chronic problem  The current episode started more than 1 year ago  The problem has been gradually worsening since onset  Her stool frequency is 1 time per day  The stool is described as firm  The patient is on a high fiber diet  She does not exercise regularly  There has been adequate water intake  Associated symptoms include abdominal pain and bloating  Pertinent negatives include no anorexia, back pain, diarrhea, difficulty urinating, fecal incontinence, fever, flatus, hematochezia, hemorrhoids, melena, nausea, rectal pain, vomiting or weight loss  Risk factors include stress  She has tried diet changes and fiber for the symptoms  The treatment provided mild relief       The following portions of the patient's history were reviewed and updated as appropriate: allergies, current medications, past family history, past medical history, past social history, past surgical history and problem list   Patient Active Problem List   Diagnosis    Abnormal weight gain    Cholelithiasis without obstruction    Chronic constipation    Hypothyroidism due to acquired atrophy of thyroid    Iron deficiency anemia due to chronic blood loss    Pulmonary nodule seen on imaging study    Vitamin D deficiency    PTSD (post-traumatic stress disorder)    Medical marijuana use     Past Medical History:   Diagnosis Date    Cholelithiasis     IBS (irritable bowel syndrome)     Iron deficiency anemia     Menorrhagia     Palpitations      Past Surgical History:   Procedure Laterality Date  WISDOM TOOTH EXTRACTION       Allergies   Allergen Reactions    Lubiprostone Nausea Only and GI Intolerance     Family History   Problem Relation Age of Onset    Heart failure Mother     Hypothyroidism Sister      Social History     Socioeconomic History    Marital status: Legally      Spouse name: Not on file    Number of children: 3    Years of education: Not on file    Highest education level: Not on file   Occupational History    Occupation: homemaker   Social Needs    Financial resource strain: Not hard at all   [place] insecurity:     Worry: Never true     Inability: Never true    Transportation needs:     Medical: No     Non-medical: No   Tobacco Use    Smoking status: Former Smoker    Smokeless tobacco: Never Used   Substance and Sexual Activity    Alcohol use: Yes     Comment: socially    Drug use: Yes     Types: Marijuana     Comment: medical    Sexual activity: Yes     Partners: Male     Birth control/protection: Condom Male   Lifestyle    Physical activity:     Days per week: 0 days     Minutes per session: 0 min    Stress: Not at all   Relationships    Social connections:     Talks on phone: More than three times a week     Gets together: Twice a week     Attends Judaism service: Never     Active member of club or organization: No     Attends meetings of clubs or organizations: Never     Relationship status:     Intimate partner violence:     Fear of current or ex partner: No     Emotionally abused: No     Physically abused: No     Forced sexual activity: No   Other Topics Concern    Not on file   Social History Narrative    Not on file     Current Outpatient Medications on File Prior to Visit   Medication Sig Dispense Refill    dronabinol (MARINOL) 10 MG capsule Take 10 mg by mouth 2 (two) times a day before meals      Multiple Vitamins-Minerals (MULTIVITAMIN ADULT) TABS Take 1 tablet by mouth      [DISCONTINUED] dicyclomine (BENTYL) 20 mg tablet Take 1 tablet (20 mg total) by mouth 4 (four) times a day as needed (abdominal cramps/ bloating, WITH LOOSE STOOL) for up to 7 days 12 tablet 0    [DISCONTINUED] omeprazole (PriLOSEC) 40 MG capsule Take 1 capsule (40 mg total) by mouth daily before breakfast (Patient not taking: Reported on 1/27/2020) 30 capsule 1     No current facility-administered medications on file prior to visit  Review of Systems   Constitutional: Negative  Negative for fever and weight loss  HENT: Negative  Eyes: Negative  Respiratory: Negative  Cardiovascular: Negative  Gastrointestinal: Positive for abdominal pain, bloating and constipation  Negative for anorexia, diarrhea, flatus, hematochezia, hemorrhoids, melena, nausea, rectal pain and vomiting  Endocrine: Negative  Genitourinary: Negative  Negative for difficulty urinating  Musculoskeletal: Negative  Negative for back pain  Skin: Negative  Allergic/Immunologic: Negative  Neurological: Negative  Hematological: Negative  Psychiatric/Behavioral: Negative  Objective:  Vitals:    02/04/20 1102   BP: 102/70   BP Location: Left arm   Patient Position: Sitting   Cuff Size: Standard   Pulse: 72   Resp: 12   Temp: 100 4 °F (38 °C)   TempSrc: Tympanic   Weight: 73 kg (161 lb)   Height: 5' 4" (1 626 m)     Body mass index is 27 64 kg/m²  Physical Exam   Constitutional: She is oriented to person, place, and time  She appears well-developed and well-nourished  HENT:   Head: Normocephalic and atraumatic  Mouth/Throat: No oropharyngeal exudate  Eyes: Pupils are equal, round, and reactive to light  Conjunctivae and EOM are normal    Neck: Normal range of motion  No JVD present  No tracheal deviation present  No thyromegaly present  Cardiovascular: Normal rate, regular rhythm, normal heart sounds and intact distal pulses  Exam reveals no gallop and no friction rub  No murmur heard    Pulmonary/Chest: Effort normal and breath sounds normal  No stridor  No respiratory distress  She has no wheezes  She has no rales  She exhibits no tenderness  Abdominal: Soft  Bowel sounds are normal  She exhibits no distension and no mass  There is no tenderness  There is no rebound and no guarding  Musculoskeletal: Normal range of motion  She exhibits no edema, tenderness or deformity  Lymphadenopathy:     She has no cervical adenopathy  Neurological: She is alert and oriented to person, place, and time  She has normal reflexes  She displays normal reflexes  No cranial nerve deficit  She exhibits normal muscle tone  Coordination normal    Skin: Skin is warm and dry         Admission on 01/27/2020, Discharged on 01/27/2020   Component Date Value    WBC 01/27/2020 6 24     RBC 01/27/2020 4 12     Hemoglobin 01/27/2020 13 4     Hematocrit 01/27/2020 39 6     MCV 01/27/2020 96     MCH 01/27/2020 32 5     MCHC 01/27/2020 33 8     RDW 01/27/2020 12 2     MPV 01/27/2020 10 6     Platelets 75/27/1484 236     nRBC 01/27/2020 0     Neutrophils Relative 01/27/2020 58     Immat GRANS % 01/27/2020 0     Lymphocytes Relative 01/27/2020 31     Monocytes Relative 01/27/2020 8     Eosinophils Relative 01/27/2020 2     Basophils Relative 01/27/2020 1     Neutrophils Absolute 01/27/2020 3 67     Immature Grans Absolute 01/27/2020 0 02     Lymphocytes Absolute 01/27/2020 1 91     Monocytes Absolute 01/27/2020 0 48     Eosinophils Absolute 01/27/2020 0 11     Basophils Absolute 01/27/2020 0 05     Sodium 01/27/2020 141     Potassium 01/27/2020 3 4*    Chloride 01/27/2020 105     CO2 01/27/2020 27     ANION GAP 01/27/2020 9     BUN 01/27/2020 10     Creatinine 01/27/2020 0 60     Glucose 01/27/2020 91     Calcium 01/27/2020 9 0     AST 01/27/2020 19     ALT 01/27/2020 16     Alkaline Phosphatase 01/27/2020 52     Total Protein 01/27/2020 7 9     Albumin 01/27/2020 4 1     Total Bilirubin 01/27/2020 0 80     eGFR 01/27/2020 119     Lipase 01/27/2020 128     EXT PREG TEST UR (Ref: N* 01/27/2020 negative     Control 01/27/2020 valid     Color, UA 01/27/2020 yellow     Clarity, UA 01/27/2020 clear     Glucose, UA (Ref: Negati* 01/27/2020 neg     Bilirubin, UA (Ref: Nega* 01/27/2020 neg     Ketones, UA (Ref: Negati* 01/27/2020 15     Spec Grav, UA (Ref:1 003* 01/27/2020 1 015     Blood, UA (Ref: Negative) 01/27/2020 mod     pH, UA (Ref: 4 5-8 0) 01/27/2020 7     Protein, UA (Ref: Negati* 01/27/2020 neg     Urobilinogen, UA (Ref: 0* 01/27/2020 0 2      Leukocytes, UA (Ref: Ne* 01/27/2020 neg     Nitrite, UA (Ref: Negati* 01/27/2020 neg       BMI Counseling: Body mass index is 27 64 kg/m²  The BMI is above normal  Nutrition recommendations include decreasing portion sizes, encouraging healthy choices of fruits and vegetables, decreasing fast food intake, consuming healthier snacks, limiting drinks that contain sugar, moderation in carbohydrate intake, increasing intake of lean protein, reducing intake of saturated and trans fat and reducing intake of cholesterol  Exercise recommendations include exercising 3-5 times per week  No pharmacotherapy was ordered  Patient referred to PCP due to patient being overweight

## 2020-02-05 ENCOUNTER — OFFICE VISIT (OUTPATIENT)
Dept: URGENT CARE | Facility: CLINIC | Age: 35
End: 2020-02-05
Payer: COMMERCIAL

## 2020-02-05 VITALS
SYSTOLIC BLOOD PRESSURE: 118 MMHG | HEIGHT: 64 IN | BODY MASS INDEX: 26.98 KG/M2 | DIASTOLIC BLOOD PRESSURE: 62 MMHG | WEIGHT: 158 LBS | HEART RATE: 102 BPM | OXYGEN SATURATION: 98 % | RESPIRATION RATE: 16 BRPM | TEMPERATURE: 103.2 F

## 2020-02-05 DIAGNOSIS — J11.1 INFLUENZA: Primary | ICD-10-CM

## 2020-02-05 PROCEDURE — G0382 LEV 3 HOSP TYPE B ED VISIT: HCPCS | Performed by: FAMILY MEDICINE

## 2020-02-05 PROCEDURE — 99283 EMERGENCY DEPT VISIT LOW MDM: CPT | Performed by: FAMILY MEDICINE

## 2020-02-05 RX ORDER — OSELTAMIVIR PHOSPHATE 75 MG/1
75 CAPSULE ORAL EVERY 12 HOURS SCHEDULED
Qty: 10 CAPSULE | Refills: 0 | Status: SHIPPED | OUTPATIENT
Start: 2020-02-05 | End: 2020-02-10

## 2020-02-05 NOTE — LETTER
February 5, 2020     Patient: Mayra Ornelas   YOB: 1985   Date of Visit: 2/5/2020       To Whom it May Concern:    Mayra Ornelas was seen in my clinic on 2/5/2020  She is excused from work due to illness 2/4-8/2020  If you have any questions or concerns, please don't hesitate to call  Sincerely,          Ros Hernandez MD        CC:  Mayra Renteriajada

## 2020-02-05 NOTE — PATIENT INSTRUCTIONS
F/u as needed  Most likely viral influenza  Tamiflu  Supportive care  OTC meds as needed  F/u with PCP if no improvement

## 2020-02-05 NOTE — PROGRESS NOTES
NAME: Teresa Mandel is a 28 y o  female  : 1985    MRN: [id number]      Assessment and Plan   Influenza [J11 1]  1  Influenza  oseltamivir (TAMIFLU) 75 mg capsule           Patient Instructions   Patient Instructions   F/u as needed  Most likely viral influenza  Tamiflu  Supportive care  OTC meds as needed  F/u with PCP if no improvement  Proceed to ER if symptoms worsen  Chief Complaint     Chief Complaint   Patient presents with    Flu Symptoms     Pt reports on  she developed a cough and HA  Last night she developed body aches, chills and Tmax 101  History of Present Illness   Here c/o fever,  Flu symptoms- cough, body aches, sore throat, cough  Started last night  Review of Systems   Review of Systems   Constitutional: Positive for fatigue and fever  HENT: Positive for sore throat  Negative for ear pain and trouble swallowing  Eyes: Negative for visual disturbance  Respiratory: Positive for cough  Negative for chest tightness and shortness of breath  Cardiovascular: Positive for chest pain  Gastrointestinal: Negative for abdominal pain, diarrhea, nausea and vomiting  Genitourinary: Negative for difficulty urinating and dysuria  Musculoskeletal: Positive for arthralgias and myalgias  Skin: Negative for rash and wound  Neurological: Positive for weakness and headaches           Current Medications       Current Outpatient Medications:     dronabinol (MARINOL) 10 MG capsule, Take 10 mg by mouth 2 (two) times a day before meals, Disp: , Rfl:     Multiple Vitamins-Minerals (MULTIVITAMIN ADULT) TABS, Take 1 tablet by mouth, Disp: , Rfl:     oseltamivir (TAMIFLU) 75 mg capsule, Take 1 capsule (75 mg total) by mouth every 12 (twelve) hours for 5 days, Disp: 10 capsule, Rfl: 0    Current Allergies     Allergies as of 2020 - Reviewed 2020   Allergen Reaction Noted    Lubiprostone Nausea Only and GI Intolerance 10/30/2017              Past Medical History:   Diagnosis Date    Cholelithiasis     IBS (irritable bowel syndrome)     Iron deficiency anemia     Menorrhagia     Palpitations        Past Surgical History:   Procedure Laterality Date    WISDOM TOOTH EXTRACTION         Family History   Problem Relation Age of Onset    Heart failure Mother     Hypothyroidism Sister          Medications have been verified  The following portions of the patient's history were reviewed and updated as appropriate: allergies, current medications, past family history, past medical history, past social history, past surgical history and problem list     Objective   /62   Pulse 102   Temp (!) 103 2 °F (39 6 °C)   Resp 16   Ht 5' 4" (1 626 m)   Wt 71 7 kg (158 lb)   LMP 01/24/2020   SpO2 98%   BMI 27 12 kg/m²      Physical Exam     Physical Exam   Constitutional: She is oriented to person, place, and time  She appears well-developed and well-nourished  HENT:   Head: Normocephalic  Mouth/Throat: Oropharynx is clear and moist  No oropharyngeal exudate  Eyes: EOM are normal    Neck: Normal range of motion  Cardiovascular: Normal rate, regular rhythm and normal heart sounds  Exam reveals no gallop and no friction rub  No murmur heard  Pulmonary/Chest: Effort normal and breath sounds normal  No respiratory distress  She has no wheezes  She has no rales  Abdominal: Soft  Bowel sounds are normal  She exhibits no distension  There is no tenderness  There is no rebound and no guarding  Musculoskeletal: Normal range of motion  Lymphadenopathy:     She has no cervical adenopathy  Neurological: She is oriented to person, place, and time  Skin: Skin is warm and dry  No rash noted  Psychiatric: She has a normal mood and affect  Thought content normal    Nursing note and vitals reviewed

## 2021-05-05 ENCOUNTER — OFFICE VISIT (OUTPATIENT)
Dept: URGENT CARE | Facility: CLINIC | Age: 36
End: 2021-05-05
Payer: COMMERCIAL

## 2021-05-05 VITALS
RESPIRATION RATE: 16 BRPM | OXYGEN SATURATION: 99 % | HEIGHT: 64 IN | TEMPERATURE: 96.8 F | WEIGHT: 160 LBS | BODY MASS INDEX: 27.31 KG/M2 | HEART RATE: 72 BPM

## 2021-05-05 DIAGNOSIS — B96.89 ACUTE BACTERIAL BRONCHITIS: Primary | ICD-10-CM

## 2021-05-05 DIAGNOSIS — J20.8 ACUTE BACTERIAL BRONCHITIS: Primary | ICD-10-CM

## 2021-05-05 PROCEDURE — 99283 EMERGENCY DEPT VISIT LOW MDM: CPT | Performed by: PHYSICIAN ASSISTANT

## 2021-05-05 PROCEDURE — U0005 INFEC AGEN DETEC AMPLI PROBE: HCPCS | Performed by: PHYSICIAN ASSISTANT

## 2021-05-05 PROCEDURE — U0003 INFECTIOUS AGENT DETECTION BY NUCLEIC ACID (DNA OR RNA); SEVERE ACUTE RESPIRATORY SYNDROME CORONAVIRUS 2 (SARS-COV-2) (CORONAVIRUS DISEASE [COVID-19]), AMPLIFIED PROBE TECHNIQUE, MAKING USE OF HIGH THROUGHPUT TECHNOLOGIES AS DESCRIBED BY CMS-2020-01-R: HCPCS | Performed by: PHYSICIAN ASSISTANT

## 2021-05-05 PROCEDURE — G0382 LEV 3 HOSP TYPE B ED VISIT: HCPCS | Performed by: PHYSICIAN ASSISTANT

## 2021-05-05 RX ORDER — AZITHROMYCIN 250 MG/1
TABLET, FILM COATED ORAL
Qty: 6 TABLET | Refills: 0 | Status: SHIPPED | OUTPATIENT
Start: 2021-05-05 | End: 2021-05-09

## 2021-05-05 RX ORDER — BENZONATATE 200 MG/1
200 CAPSULE ORAL 3 TIMES DAILY PRN
Qty: 20 CAPSULE | Refills: 0 | Status: SHIPPED | OUTPATIENT
Start: 2021-05-05 | End: 2022-05-18

## 2021-05-05 NOTE — PROGRESS NOTES
[facility]        NAME: Avelina Beaulieu is a 39 y o  female  : 1985    MRN: [id number]  DATE: May 5, 2021  TIME: 1:08 PM    Assessment and Plan   Acute bacterial bronchitis [J20 8, B96 89]  1  Acute bacterial bronchitis  Novel Coronavirus (Covid-19),PCR [facility] - Office Collection    azithromycin (ZITHROMAX) 250 mg tablet    benzonatate (TESSALON) 200 MG capsule         Patient Instructions       Continue to monitor symptoms  If new or worsening symptoms develop, go immediately to Er  Drink plenty of fluids  Follow up with Family Doctor this week  Chief Complaint     Chief Complaint   Patient presents with    Cough     Patient reports non productive cough for approx 3 weeks  denies any other symptoms  History of Present Illness       Cough  This is a new problem  Episode onset: 3 weeks ago  The problem has been gradually worsening  The problem occurs every few minutes  Cough characteristics: Was productive but yesterday feels like productivity has stopped and she cant get the mucus up  Associated symptoms include nasal congestion, postnasal drip and a sore throat  Pertinent negatives include no chest pain, chills, ear pain, fever, headaches, hemoptysis, myalgias, rash, rhinorrhea, shortness of breath or wheezing  Nothing aggravates the symptoms  Risk factors: Former smoker  Now vaped Medical Marijuana  Treatments tried: Mucinex x1 yesterday  The treatment provided mild relief  No known sick contacts  Review of Systems   Review of Systems   Constitutional: Positive for fatigue  Negative for chills, diaphoresis and fever  HENT: Positive for postnasal drip, sinus pressure and sore throat  Negative for congestion, ear pain, rhinorrhea, sinus pain, sneezing and voice change  Eyes: Negative  Respiratory: Positive for cough  Negative for hemoptysis, chest tightness, shortness of breath and wheezing  Cardiovascular: Negative for chest pain and palpitations  Gastrointestinal: Negative for abdominal pain, constipation, diarrhea, nausea and vomiting  Endocrine: Negative  Genitourinary: Negative for dysuria  Musculoskeletal: Negative for back pain, myalgias and neck pain  Skin: Negative for pallor and rash  Allergic/Immunologic: Negative  Neurological: Negative for dizziness, syncope and headaches  Hematological: Negative  Psychiatric/Behavioral: Negative  Current Medications       Current Outpatient Medications:     Multiple Vitamins-Minerals (MULTIVITAMIN ADULT) TABS, Take 1 tablet by mouth, Disp: , Rfl:     azithromycin (ZITHROMAX) 250 mg tablet, Take 2 tablets today then 1 tablet daily x 4 days, Disp: 6 tablet, Rfl: 0    benzonatate (TESSALON) 200 MG capsule, Take 1 capsule (200 mg total) by mouth 3 (three) times a day as needed for cough, Disp: 20 capsule, Rfl: 0    dronabinol (MARINOL) 10 MG capsule, Take 10 mg by mouth 2 (two) times a day before meals, Disp: , Rfl:     Current Allergies     Allergies as of 05/05/2021 - Reviewed 05/05/2021   Allergen Reaction Noted    Lubiprostone Nausea Only and GI Intolerance 10/30/2017            The following portions of the patient's history were reviewed and updated as appropriate: allergies, current medications, past family history, past medical history, past social history, past surgical history and problem list      Past Medical History:   Diagnosis Date    Cholelithiasis     IBS (irritable bowel syndrome)     Iron deficiency anemia     Menorrhagia     Palpitations        Past Surgical History:   Procedure Laterality Date    WISDOM TOOTH EXTRACTION         Family History   Problem Relation Age of Onset    Heart failure Mother     Hypothyroidism Sister          Medications have been verified          Objective   Pulse 72   Temp (!) 96 8 °F (36 °C)   Resp 16   Ht 5' 4" (1 626 m)   Wt 72 6 kg (160 lb)   SpO2 99%   BMI 27 46 kg/m²        Physical Exam     Physical Exam  Vitals signs and nursing note reviewed  Constitutional:       General: She is not in acute distress  Appearance: Normal appearance  She is well-developed  She is not ill-appearing or diaphoretic  HENT:      Head: Normocephalic and atraumatic  Right Ear: External ear normal       Left Ear: External ear normal       Nose: No congestion or rhinorrhea  Mouth/Throat:      Pharynx: Posterior oropharyngeal erythema present  No oropharyngeal exudate  Eyes:      General:         Right eye: No discharge  Left eye: No discharge  Conjunctiva/sclera: Conjunctivae normal    Neck:      Musculoskeletal: Normal range of motion and neck supple  Cardiovascular:      Rate and Rhythm: Normal rate and regular rhythm  Heart sounds: Normal heart sounds  Pulmonary:      Effort: Pulmonary effort is normal  No respiratory distress  Breath sounds: Normal breath sounds  No wheezing, rhonchi or rales  Lymphadenopathy:      Cervical: No cervical adenopathy  Skin:     General: Skin is warm  Findings: No rash  Neurological:      Mental Status: She is alert

## 2021-05-05 NOTE — LETTER
May 5, 2021     Patient: Adrien Chanel   YOB: 1985   Date of Visit: 5/5/2021       To Whom It May Concern: It is my medical opinion that Adrien Chanel should remain out of work until cleared by lab examination  If you have any questions or concerns, please don't hesitate to call  Sincerely,        Shalonda Kc PA-C    CC:  Adrien Zamoravikas

## 2021-05-05 NOTE — PATIENT INSTRUCTIONS
Patient Instructions   COVID testing initiated  Results may take up to 5-10 days to return, but often come back sooner (2-4 days)     If the patient has a [facility] My Chart account, results may be accessed on line  If the patient does not have the [facility] Chart account, please establish one so results can be accessed  This will be the easiest and quickest way to get a copy of your test results if you require printed documentation  If patient is symptomatic and until results are obtained, home quarantine / self isolation strongly encouraged  If testing is done for screening purposes and patient is not symptomatic, we still recommend masking, social distancing, good hygiene practices be followed  If COVID test is positive, patient / care giver will be contacted by ordering provider or designated staff  If COVID test is positive, please call the primary care provider office to inform of positive test and request follow up evaluation appointment  (Generally, primary care providers are doing telemedicine visits with their positive COVID patients )  If COVID test is positive, please again review all information below  Further questions may be addressed by the primary care provider or the [address] [place] [address] [facility] at [phone]  If the patient / caregiver has not heard about test results or has been unable to access results on the patient My Chart account in a timely fashion, please call the provider's office where test was ordered (or Hot Line if applicable)  to inquire about results  If results are negative and patient / care giver has been found to have already accessed results through the [facility]  [employer] Chart margarito, no call will be made  Until results are obtained, home quarantine / self isolation strongly encouraged       If the patient would develop profound weakness, chest pain, shortness of breath please proceed to an emergency room for further evaluation otherwise we do recommend that patient follow-up with their primary care provider in the next 5-7 days if not improving  Symptomatic treatment as needed for symptoms relief based on age / medical status of patient  Things like warm salt water gargles, Tylenol or Ibuprofen (if not contraindicated), drinking plenty of fluids, nasal saline rinses / spray, warm tea with honey (not for patients less than 1 year of age),  etc may provide symptoms relief  [address]     Your healthcare provider and/or public health staff have evaluated you and have determined that you do not need to remain in the hospital at this time  At this time you can be isolated at home where you will be monitored by staff from your local or state health department  You should carefully follow the prevention and isolation steps below until a healthcare provider or local or state health department says that you can return to your normal activities  Stay home except to get medical care     People who are mildly ill with COVID-19 are able to isolate at home during their illness  You should restrict activities outside your home, except for getting medical care  Do not go to work, school, or public areas  Avoid using public transportation, ride-sharing, or taxis  Separate yourself from other people and animals in your home     People: As much as possible, you should stay in a specific room and away from other people in your home  Also, you should use a separate bathroom, if available  Animals: You should restrict contact with pets and other animals while you are sick with COVID-19, just like you would around other people  Although there have not been reports of pets or other animals becoming sick with COVID-19, it is still recommended that people sick with COVID-19 limit contact with animals until more information is known about the virus   When possible, have another member of your household care for your animals while you are sick  If you are sick with COVID-19, avoid contact with your pet, including petting, snuggling, being kissed or licked, and sharing food  If you must care for your pet or be around animals while you are sick, wash your hands before and after you interact with pets and wear a facemask  See COVID-19 and Animals for more information  Call ahead before visiting your doctor     If you have a medical appointment, call the healthcare provider and tell them that you have or may have COVID-19  This will help the healthcare providers office take steps to keep other people from getting infected or exposed  Wear a facemask     You should wear a facemask when you are around other people (e g , sharing a room or vehicle) or pets and before you enter a healthcare providers office  If you are not able to wear a facemask (for example, because it causes trouble breathing), then people who live with you should not stay in the same room with you, or they should wear a facemask if they enter your room  Cover your coughs and sneezes     Cover your mouth and nose with a tissue when you cough or sneeze  Throw used tissues in a lined trash can  Immediately wash your hands with soap and water for at least 20 seconds or, if soap and water are not available, clean your hands with an alcohol-based hand  that contains at least 60% alcohol  Clean your hands often     Wash your hands often with soap and water for at least 20 seconds, especially after blowing your nose, coughing, or sneezing; going to the bathroom; and before eating or preparing food  If soap and water are not readily available, use an alcohol-based hand  with at least 60% alcohol, covering all surfaces of your hands and rubbing them together until they feel dry  Soap and water are the best option if hands are visibly dirty  Avoid touching your eyes, nose, and mouth with unwashed hands       Avoid sharing personal household items     You should not share dishes, drinking glasses, cups, eating utensils, towels, or bedding with other people or pets in your home  After using these items, they should be washed thoroughly with soap and water  Clean all high-touch surfaces everyday     High touch surfaces include counters, tabletops, doorknobs, bathroom fixtures, toilets, phones, keyboards, tablets, and bedside tables  Also, clean any surfaces that may have blood, stool, or body fluids on them  Use a household cleaning spray or wipe, according to the label instructions  Labels contain instructions for safe and effective use of the cleaning product including precautions you should take when applying the product, such as wearing gloves and making sure you have good ventilation during use of the product  Monitor your symptoms     Seek prompt medical attention if your illness is worsening (e g , difficulty breathing)  Before seeking care, call your healthcare provider and tell them that you have, or are being evaluated for, COVID-19  Put on a facemask before you enter the facility  These steps will help the healthcare providers office to keep other people in the office or waiting room from getting infected or exposed  Ask your healthcare provider to call the local or [facility] health department  Persons who are placed under active monitoring or facilitated self-monitoring should follow instructions provided by their local health department or occupational health professionals, as appropriate  If you have a medical emergency and need to call 911, notify the dispatch personnel that you have, or are being evaluated for COVID-19  If possible, put on a facemask before emergency medical services arrive       Discontinuing home isolation     Patients with confirmed COVID-19 should remain under home isolation precautions until the following conditions are met:   § They have had no fever for at least 24 hours (that is one full day of no fever without the use medicine that reduces fevers)  AND  § other symptoms have improved (for example, when their cough or shortness of breath have improved)  AND  § at least 10 days have passed since their symptoms first appeared     Patients with confirmed COVID-19 should also notify close contacts (including their workplace) and ask that they self-quarantine  Currently, close contact is defined as being within 6 feet for for a cumulative total of 15 minutes or more over a 24 hour period starting from 2 days before illness onset  (or, for asymptomatic patients, 2 days prior to test specimen collection)  Close contacts of patients diagnosed with COVID-19 should be instructed by the patient to self-quarantine for 14 days from the last time of their last contact with the patient        Source: [employer]

## 2021-05-06 LAB — SARS-COV-2 RNA RESP QL NAA+PROBE: NEGATIVE

## 2021-06-16 ENCOUNTER — TELEPHONE (OUTPATIENT)
Dept: FAMILY MEDICINE CLINIC | Facility: CLINIC | Age: 36
End: 2021-06-16

## 2021-06-16 DIAGNOSIS — R35.0 URINARY FREQUENCY: Primary | ICD-10-CM

## 2021-06-16 NOTE — TELEPHONE ENCOUNTER
I called and spoke with the patient on 6/16/2021  She states that her daughter was just contacted by urgent care and advised that she has a MRSA UTI  I advised the patient's mother that this is extremely rare  The patient states that she now herself is having urinary symptoms that started last night  She is having some increased frequency but no dysuria  There is no fever  There is no blood in the urine  She is treating it with homeopathic medications  I advised her that we will obtain a urinalysis and urine culture to rule out an infection    She will  the paperwork to take it to [employer]

## 2021-06-18 LAB
APPEARANCE UR: CLEAR
BACTERIA UR CULT: NORMAL
BACTERIA URNS QL MICRO: NORMAL
BILIRUB UR QL STRIP: NEGATIVE
CASTS URNS QL MICRO: NORMAL /LPF
COLOR UR: YELLOW
EPI CELLS #/AREA URNS HPF: NORMAL /HPF (ref 0–10)
GLUCOSE UR QL: NEGATIVE
HGB UR QL STRIP: NEGATIVE
KETONES UR QL STRIP: NEGATIVE
LEUKOCYTE ESTERASE UR QL STRIP: NEGATIVE
Lab: NORMAL
MICRO URNS: NORMAL
MICRO URNS: NORMAL
NITRITE UR QL STRIP: NEGATIVE
PH UR STRIP: 7 [PH] (ref 5–7.5)
PROT UR QL STRIP: NEGATIVE
RBC #/AREA URNS HPF: NORMAL /HPF (ref 0–2)
SP GR UR: 1 (ref 1–1.03)
UROBILINOGEN UR STRIP-ACNC: 0.2 MG/DL (ref 0.2–1)
WBC #/AREA URNS HPF: NORMAL /HPF (ref 0–5)

## 2021-06-21 ENCOUNTER — TELEPHONE (OUTPATIENT)
Dept: FAMILY MEDICINE CLINIC | Facility: CLINIC | Age: 36
End: 2021-06-21

## 2021-06-21 NOTE — TELEPHONE ENCOUNTER
Vinh Dowling is still having symptoms such as Frequency and constantly feel like she has to go all the time but almost nothing comes out  Did a home test and lucacites came up highest reading  She is wondering what is the next step   Please call her when you can  [phone]  Thank you

## 2021-06-25 ENCOUNTER — OFFICE VISIT (OUTPATIENT)
Dept: FAMILY MEDICINE CLINIC | Facility: CLINIC | Age: 36
End: 2021-06-25
Payer: COMMERCIAL

## 2021-06-25 VITALS
OXYGEN SATURATION: 99 % | TEMPERATURE: 99.8 F | WEIGHT: 155.2 LBS | HEIGHT: 64 IN | HEART RATE: 77 BPM | SYSTOLIC BLOOD PRESSURE: 120 MMHG | DIASTOLIC BLOOD PRESSURE: 74 MMHG | BODY MASS INDEX: 26.5 KG/M2 | RESPIRATION RATE: 16 BRPM

## 2021-06-25 DIAGNOSIS — R35.0 FREQUENCY OF URINATION: ICD-10-CM

## 2021-06-25 DIAGNOSIS — R39.15 URGENCY OF URINATION: Primary | ICD-10-CM

## 2021-06-25 LAB
SL AMB  POCT GLUCOSE, UA: NORMAL
SL AMB LEUKOCYTE ESTERASE,UA: NORMAL
SL AMB POCT BILIRUBIN,UA: NORMAL
SL AMB POCT BLOOD,UA: NORMAL
SL AMB POCT CLARITY,UA: CLEAR
SL AMB POCT COLOR,UA: YELLOW
SL AMB POCT KETONES,UA: NORMAL
SL AMB POCT NITRITE,UA: NORMAL
SL AMB POCT PH,UA: 5
SL AMB POCT SPECIFIC GRAVITY,UA: 1.01
SL AMB POCT URINE PROTEIN: NORMAL
SL AMB POCT UROBILINOGEN: NORMAL

## 2021-06-25 PROCEDURE — 1036F TOBACCO NON-USER: CPT | Performed by: NURSE PRACTITIONER

## 2021-06-25 PROCEDURE — 3008F BODY MASS INDEX DOCD: CPT | Performed by: NURSE PRACTITIONER

## 2021-06-25 PROCEDURE — 81002 URINALYSIS NONAUTO W/O SCOPE: CPT | Performed by: NURSE PRACTITIONER

## 2021-06-25 PROCEDURE — 3725F SCREEN DEPRESSION PERFORMED: CPT | Performed by: NURSE PRACTITIONER

## 2021-06-25 PROCEDURE — 99213 OFFICE O/P EST LOW 20 MIN: CPT | Performed by: NURSE PRACTITIONER

## 2021-06-25 NOTE — PROGRESS NOTES
Assessment/Plan   Problem List Items Addressed This Visit     None      Visit Diagnoses     Urgency of urination    -  Primary    Relevant Orders    US kidney and bladder    POCT urine dip (Completed)    Frequency of urination        Relevant Orders    US kidney and bladder    POCT urine dip (Completed)                Chief Complaint   Patient presents with    Urinary Frequency     Urinating frequently  Subjective   Patient ID: Claire Gaines is a 39 y o  female  Vitals:    06/25/21 1653   BP: 120/74   Pulse: 77   Resp: 16   Temp: 99 8 °F (37 7 °C)   SpO2: 99%     Urinary Frequency   This is a recurrent problem  The current episode started 1 to 4 weeks ago (off and on has had testing strips at home and sometimes they are positive and sometimes they are not, has had a urinalysis and culture without abnormal findings)  The problem occurs intermittently  The problem has been waxing and waning  The quality of the pain is described as aching  The pain is at a severity of 3/10  The pain is mild  There has been no fever  She is sexually active  There is no history of pyelonephritis  Associated symptoms include frequency (resolved over the past two days) and urgency (resolved over the past two days)  Pertinent negatives include no discharge, flank pain, hematuria, possible pregnancy, sweats or vomiting  She has tried increased fluids (cranberry and garlic pills) for the symptoms  The treatment provided significant relief  There is no history of catheterization or recurrent UTIs  The following portions of the patient's history were reviewed and updated as appropriate: allergies, current medications, past family history, past social history, past surgical history and problem list     Review of Systems   Constitutional: Negative  HENT: Negative  Eyes: Negative  Respiratory: Negative  Cardiovascular: Negative  Gastrointestinal: Negative  Negative for vomiting  Endocrine: Negative  Genitourinary: Positive for frequency (resolved over the past two days) and urgency (resolved over the past two days)  Negative for flank pain and hematuria  Musculoskeletal: Negative  Skin: Negative  Allergic/Immunologic: Negative  Neurological: Negative  Hematological: Negative  Psychiatric/Behavioral: Negative  Objective     Physical Exam  Vitals and nursing note reviewed  Constitutional:       General: She is not in acute distress  Appearance: She is not ill-appearing, toxic-appearing or diaphoretic  HENT:      Head: Normocephalic and atraumatic  Mouth/Throat:      Mouth: Mucous membranes are moist       Pharynx: Oropharynx is clear  No oropharyngeal exudate  Eyes:      General:         Right eye: No discharge  Left eye: No discharge  Extraocular Movements: Extraocular movements intact  Conjunctiva/sclera: Conjunctivae normal    Cardiovascular:      Rate and Rhythm: Normal rate and regular rhythm  Pulses: Normal pulses  Pulmonary:      Effort: Pulmonary effort is normal       Breath sounds: Normal breath sounds  Abdominal:      General: Bowel sounds are normal       Palpations: Abdomen is soft  Tenderness: There is no abdominal tenderness  There is no right CVA tenderness or left CVA tenderness  Musculoskeletal:         General: Normal range of motion  Cervical back: Normal range of motion  Skin:     General: Skin is warm and dry  Capillary Refill: Capillary refill takes less than 2 seconds  Coloration: Skin is not jaundiced  Findings: No lesion  Neurological:      Mental Status: She is alert and oriented to person, place, and time  Psychiatric:         Mood and Affect: Mood normal          Behavior: Behavior normal          Thought Content:  Thought content normal          Judgment: Judgment normal        Allergies   Allergen Reactions    Lubiprostone Nausea Only and GI Intolerance

## 2021-06-27 LAB
BACTERIA UR CULT: NORMAL
Lab: NO GROWTH

## 2021-07-30 ENCOUNTER — HOSPITAL ENCOUNTER (OUTPATIENT)
Dept: ULTRASOUND IMAGING | Facility: CLINIC | Age: 36
Discharge: HOME | End: 2021-07-30
Payer: COMMERCIAL

## 2021-07-30 DIAGNOSIS — R35.0 FREQUENCY OF URINATION: ICD-10-CM

## 2021-07-30 DIAGNOSIS — R39.15 URGENCY OF URINATION: ICD-10-CM

## 2021-07-30 PROCEDURE — 76770 US EXAM ABDO BACK WALL COMP: CPT

## 2021-10-04 ENCOUNTER — TELEPHONE (OUTPATIENT)
Dept: FAMILY MEDICINE CLINIC | Facility: CLINIC | Age: 36
End: 2021-10-04

## 2021-10-04 DIAGNOSIS — D50.0 IRON DEFICIENCY ANEMIA DUE TO CHRONIC BLOOD LOSS: ICD-10-CM

## 2021-10-04 DIAGNOSIS — E03.4 HYPOTHYROIDISM DUE TO ACQUIRED ATROPHY OF THYROID: Primary | ICD-10-CM

## 2021-10-04 DIAGNOSIS — E55.9 VITAMIN D DEFICIENCY: ICD-10-CM

## 2021-10-04 DIAGNOSIS — R63.5 ABNORMAL WEIGHT GAIN: ICD-10-CM

## 2021-10-07 LAB
25(OH)D3+25(OH)D2 SERPL-MCNC: 51.8 NG/ML (ref 30–100)
ALBUMIN SERPL-MCNC: 4.2 G/DL (ref 3.8–4.8)
ALBUMIN/GLOB SERPL: 2 {RATIO} (ref 1.2–2.2)
ALP SERPL-CCNC: 47 IU/L (ref 44–121)
ALT SERPL-CCNC: 13 IU/L (ref 0–32)
AST SERPL-CCNC: 20 IU/L (ref 0–40)
BASOPHILS # BLD AUTO: 0.1 X10E3/UL (ref 0–0.2)
BASOPHILS NFR BLD AUTO: 2 %
BILIRUB SERPL-MCNC: 0.3 MG/DL (ref 0–1.2)
BUN SERPL-MCNC: 7 MG/DL (ref 6–20)
BUN/CREAT SERPL: 9 (ref 9–23)
CALCIUM SERPL-MCNC: 9.2 MG/DL (ref 8.7–10.2)
CHLORIDE SERPL-SCNC: 103 MMOL/L (ref 96–106)
CHOLEST SERPL-MCNC: 172 MG/DL (ref 100–199)
CHOLEST/HDLC SERPL: 2 RATIO (ref 0–4.4)
CO2 SERPL-SCNC: 24 MMOL/L (ref 20–29)
CREAT SERPL-MCNC: 0.74 MG/DL (ref 0.57–1)
EOSINOPHIL # BLD AUTO: 0.2 X10E3/UL (ref 0–0.4)
EOSINOPHIL NFR BLD AUTO: 3 %
ERYTHROCYTE [DISTWIDTH] IN BLOOD BY AUTOMATED COUNT: 12 % (ref 11.7–15.4)
GLOBULIN SER-MCNC: 2.1 G/DL (ref 1.5–4.5)
GLUCOSE SERPL-MCNC: 90 MG/DL (ref 65–99)
HCT VFR BLD AUTO: 36.3 % (ref 34–46.6)
HDLC SERPL-MCNC: 84 MG/DL
HGB BLD-MCNC: 12.7 G/DL (ref 11.1–15.9)
IMM GRANULOCYTES # BLD: 0 X10E3/UL (ref 0–0.1)
IMM GRANULOCYTES NFR BLD: 0 %
LDLC SERPL CALC-MCNC: 79 MG/DL (ref 0–99)
LDLC SERPL DIRECT ASSAY-MCNC: 75 MG/DL (ref 0–99)
LYMPHOCYTES # BLD AUTO: 2 X10E3/UL (ref 0.7–3.1)
LYMPHOCYTES NFR BLD AUTO: 39 %
MCH RBC QN AUTO: 32.5 PG (ref 26.6–33)
MCHC RBC AUTO-ENTMCNC: 35 G/DL (ref 31.5–35.7)
MCV RBC AUTO: 93 FL (ref 79–97)
MONOCYTES # BLD AUTO: 0.4 X10E3/UL (ref 0.1–0.9)
MONOCYTES NFR BLD AUTO: 8 %
NEUTROPHILS # BLD AUTO: 2.4 X10E3/UL (ref 1.4–7)
NEUTROPHILS NFR BLD AUTO: 48 %
PLATELET # BLD AUTO: 241 X10E3/UL (ref 150–450)
POTASSIUM SERPL-SCNC: 4.3 MMOL/L (ref 3.5–5.2)
PROT SERPL-MCNC: 6.3 G/DL (ref 6–8.5)
RBC # BLD AUTO: 3.91 X10E6/UL (ref 3.77–5.28)
SL AMB EGFR AFRICAN AMERICAN: 121 ML/MIN/1.73
SL AMB EGFR NON AFRICAN AMERICAN: 105 ML/MIN/1.73
SL AMB VLDL CHOLESTEROL CALC: 9 MG/DL (ref 5–40)
SODIUM SERPL-SCNC: 138 MMOL/L (ref 134–144)
T4 FREE SERPL-MCNC: 0.84 NG/DL (ref 0.82–1.77)
THYROPEROXIDASE AB SERPL-ACNC: <8 IU/ML (ref 0–34)
TRIGL SERPL-MCNC: 39 MG/DL (ref 0–149)
TSH SERPL DL<=0.005 MIU/L-ACNC: 1.62 UIU/ML (ref 0.45–4.5)
WBC # BLD AUTO: 5 X10E3/UL (ref 3.4–10.8)

## 2021-10-16 ENCOUNTER — APPOINTMENT (EMERGENCY)
Dept: RADIOLOGY | Facility: HOSPITAL | Age: 36
End: 2021-10-16
Payer: COMMERCIAL

## 2021-10-16 ENCOUNTER — HOSPITAL ENCOUNTER (EMERGENCY)
Facility: HOSPITAL | Age: 36
Discharge: HOME | End: 2021-10-16
Attending: EMERGENCY MEDICINE | Admitting: EMERGENCY MEDICINE
Payer: COMMERCIAL

## 2021-10-16 VITALS
TEMPERATURE: 98.6 F | OXYGEN SATURATION: 100 % | RESPIRATION RATE: 18 BRPM | HEART RATE: 81 BPM | DIASTOLIC BLOOD PRESSURE: 73 MMHG | SYSTOLIC BLOOD PRESSURE: 133 MMHG

## 2021-10-16 DIAGNOSIS — U07.1 COVID-19: Primary | ICD-10-CM

## 2021-10-16 PROCEDURE — 99284 EMERGENCY DEPT VISIT MOD MDM: CPT

## 2021-10-16 PROCEDURE — 71045 X-RAY EXAM CHEST 1 VIEW: CPT

## 2021-10-16 PROCEDURE — 99282 EMERGENCY DEPT VISIT SF MDM: CPT | Performed by: EMERGENCY MEDICINE

## 2021-10-22 ENCOUNTER — OFFICE VISIT (OUTPATIENT)
Dept: FAMILY MEDICINE CLINIC | Facility: CLINIC | Age: 36
End: 2021-10-22
Payer: COMMERCIAL

## 2021-10-22 VITALS
RESPIRATION RATE: 20 BRPM | OXYGEN SATURATION: 99 % | BODY MASS INDEX: 26.05 KG/M2 | DIASTOLIC BLOOD PRESSURE: 74 MMHG | SYSTOLIC BLOOD PRESSURE: 108 MMHG | TEMPERATURE: 97.4 F | HEART RATE: 70 BPM | HEIGHT: 64 IN | WEIGHT: 152.6 LBS

## 2021-10-22 DIAGNOSIS — K29.00 ACUTE GASTRITIS WITHOUT HEMORRHAGE, UNSPECIFIED GASTRITIS TYPE: Primary | ICD-10-CM

## 2021-10-22 DIAGNOSIS — U07.1 COVID-19 VIRUS INFECTION: ICD-10-CM

## 2021-10-22 DIAGNOSIS — F41.9 ANXIETY: ICD-10-CM

## 2021-10-22 PROCEDURE — 99214 OFFICE O/P EST MOD 30 MIN: CPT | Performed by: FAMILY MEDICINE

## 2021-10-22 PROCEDURE — 3725F SCREEN DEPRESSION PERFORMED: CPT | Performed by: FAMILY MEDICINE

## 2021-10-22 RX ORDER — OMEPRAZOLE 20 MG/1
20 CAPSULE, DELAYED RELEASE ORAL DAILY
Qty: 14 CAPSULE | Refills: 0 | Status: SHIPPED | OUTPATIENT
Start: 2021-10-22 | End: 2022-05-18

## 2021-10-27 ENCOUNTER — OFFICE VISIT (OUTPATIENT)
Dept: FAMILY MEDICINE CLINIC | Facility: CLINIC | Age: 36
End: 2021-10-27
Payer: COMMERCIAL

## 2021-10-27 VITALS
OXYGEN SATURATION: 100 % | WEIGHT: 153.8 LBS | BODY MASS INDEX: 26.26 KG/M2 | HEART RATE: 81 BPM | SYSTOLIC BLOOD PRESSURE: 118 MMHG | HEIGHT: 64 IN | DIASTOLIC BLOOD PRESSURE: 74 MMHG | TEMPERATURE: 99.2 F | RESPIRATION RATE: 18 BRPM

## 2021-10-27 DIAGNOSIS — R63.5 ABNORMAL WEIGHT GAIN: ICD-10-CM

## 2021-10-27 DIAGNOSIS — R79.89 ELEVATED CORTISOL LEVEL: ICD-10-CM

## 2021-10-27 DIAGNOSIS — H69.83 EUSTACHIAN TUBE DYSFUNCTION, BILATERAL: Primary | ICD-10-CM

## 2021-10-27 PROCEDURE — 99214 OFFICE O/P EST MOD 30 MIN: CPT | Performed by: FAMILY MEDICINE

## 2021-10-27 PROCEDURE — 1036F TOBACCO NON-USER: CPT | Performed by: FAMILY MEDICINE

## 2021-10-27 PROCEDURE — 3008F BODY MASS INDEX DOCD: CPT | Performed by: FAMILY MEDICINE

## 2021-10-27 RX ORDER — METHYLPREDNISOLONE 4 MG/1
TABLET ORAL
Qty: 21 EACH | Refills: 0 | Status: SHIPPED | OUTPATIENT
Start: 2021-10-27 | End: 2022-05-18

## 2021-10-28 ENCOUNTER — TELEPHONE (OUTPATIENT)
Dept: ADMINISTRATIVE | Facility: OTHER | Age: 36
End: 2021-10-28

## 2021-10-28 PROBLEM — H69.83 EUSTACHIAN TUBE DYSFUNCTION, BILATERAL: Status: ACTIVE | Noted: 2021-10-28

## 2021-10-28 PROBLEM — R79.89 ELEVATED CORTISOL LEVEL: Status: ACTIVE | Noted: 2021-10-28

## 2021-10-28 PROBLEM — H69.93 EUSTACHIAN TUBE DYSFUNCTION, BILATERAL: Status: ACTIVE | Noted: 2021-10-28

## 2022-03-11 ENCOUNTER — APPOINTMENT (EMERGENCY)
Dept: CT IMAGING | Facility: HOSPITAL | Age: 37
End: 2022-03-11
Payer: COMMERCIAL

## 2022-03-11 ENCOUNTER — HOSPITAL ENCOUNTER (EMERGENCY)
Facility: HOSPITAL | Age: 37
Discharge: HOME | End: 2022-03-11
Attending: EMERGENCY MEDICINE
Payer: COMMERCIAL

## 2022-03-11 VITALS
TEMPERATURE: 98.5 F | SYSTOLIC BLOOD PRESSURE: 123 MMHG | HEIGHT: 64 IN | WEIGHT: 153 LBS | RESPIRATION RATE: 18 BRPM | BODY MASS INDEX: 26.12 KG/M2 | HEART RATE: 86 BPM | DIASTOLIC BLOOD PRESSURE: 69 MMHG | OXYGEN SATURATION: 99 %

## 2022-03-11 DIAGNOSIS — K80.20 GALLSTONES: ICD-10-CM

## 2022-03-11 DIAGNOSIS — R91.1 PULMONARY NODULE: ICD-10-CM

## 2022-03-11 DIAGNOSIS — R10.9 ABDOMINAL PAIN: Primary | ICD-10-CM

## 2022-03-11 LAB
ALBUMIN SERPL BCP-MCNC: 4.1 G/DL (ref 3.5–5)
ALP SERPL-CCNC: 48 U/L (ref 46–116)
ALT SERPL W P-5'-P-CCNC: 17 U/L (ref 12–78)
AMORPH PHOS CRY URNS QL MICRO: NORMAL /HPF
ANION GAP SERPL CALCULATED.3IONS-SCNC: 7 MMOL/L (ref 4–13)
AST SERPL W P-5'-P-CCNC: 14 U/L (ref 5–45)
BACTERIA UR QL AUTO: NORMAL /HPF
BASOPHILS # BLD AUTO: 0.07 THOUSANDS/ΜL (ref 0–0.1)
BASOPHILS NFR BLD AUTO: 1 % (ref 0–1)
BILIRUB SERPL-MCNC: 0.4 MG/DL (ref 0.2–1)
BILIRUB UR QL STRIP: NEGATIVE
BUN SERPL-MCNC: 11 MG/DL (ref 5–25)
CALCIUM SERPL-MCNC: 9.2 MG/DL (ref 8.3–10.1)
CHLORIDE SERPL-SCNC: 103 MMOL/L (ref 100–108)
CLARITY UR: ABNORMAL
CO2 SERPL-SCNC: 27 MMOL/L (ref 21–32)
COLOR UR: YELLOW
CREAT SERPL-MCNC: 0.64 MG/DL (ref 0.6–1.3)
EOSINOPHIL # BLD AUTO: 0.14 THOUSAND/ΜL (ref 0–0.61)
EOSINOPHIL NFR BLD AUTO: 2 % (ref 0–6)
ERYTHROCYTE [DISTWIDTH] IN BLOOD BY AUTOMATED COUNT: 12.5 % (ref 11.6–15.1)
EXT PREG TEST URINE: NEGATIVE
EXT. CONTROL ED NAV: NORMAL
GFR SERPL CREATININE-BSD FRML MDRD: 114 ML/MIN/1.73SQ M
GLUCOSE SERPL-MCNC: 87 MG/DL (ref 65–140)
GLUCOSE UR STRIP-MCNC: NEGATIVE MG/DL
HCT VFR BLD AUTO: 39.8 % (ref 34.8–46.1)
HGB BLD-MCNC: 12.9 G/DL (ref 11.5–15.4)
HGB UR QL STRIP.AUTO: NEGATIVE
IMM GRANULOCYTES # BLD AUTO: 0.01 THOUSAND/UL (ref 0–0.2)
IMM GRANULOCYTES NFR BLD AUTO: 0 % (ref 0–2)
KETONES UR STRIP-MCNC: NEGATIVE MG/DL
LEUKOCYTE ESTERASE UR QL STRIP: ABNORMAL
LIPASE SERPL-CCNC: 113 U/L (ref 73–393)
LYMPHOCYTES # BLD AUTO: 1.54 THOUSANDS/ΜL (ref 0.6–4.47)
LYMPHOCYTES NFR BLD AUTO: 25 % (ref 14–44)
MCH RBC QN AUTO: 30.6 PG (ref 26.8–34.3)
MCHC RBC AUTO-ENTMCNC: 32.4 G/DL (ref 31.4–37.4)
MCV RBC AUTO: 94 FL (ref 82–98)
MONOCYTES # BLD AUTO: 0.54 THOUSAND/ΜL (ref 0.17–1.22)
MONOCYTES NFR BLD AUTO: 9 % (ref 4–12)
NEUTROPHILS # BLD AUTO: 3.76 THOUSANDS/ΜL (ref 1.85–7.62)
NEUTS SEG NFR BLD AUTO: 63 % (ref 43–75)
NITRITE UR QL STRIP: NEGATIVE
NON-SQ EPI CELLS URNS QL MICRO: NORMAL /HPF
NRBC BLD AUTO-RTO: 0 /100 WBCS
PH UR STRIP.AUTO: 8.5 [PH]
PLATELET # BLD AUTO: 239 THOUSANDS/UL (ref 149–390)
PMV BLD AUTO: 10.2 FL (ref 8.9–12.7)
POTASSIUM SERPL-SCNC: 4 MMOL/L (ref 3.5–5.3)
PROT SERPL-MCNC: 7.4 G/DL (ref 6.4–8.2)
PROT UR STRIP-MCNC: NEGATIVE MG/DL
RBC # BLD AUTO: 4.22 MILLION/UL (ref 3.81–5.12)
RBC #/AREA URNS AUTO: NORMAL /HPF
SODIUM SERPL-SCNC: 137 MMOL/L (ref 136–145)
SP GR UR STRIP.AUTO: 1.02 (ref 1–1.03)
UROBILINOGEN UR QL STRIP.AUTO: 0.2 E.U./DL
WBC # BLD AUTO: 6.06 THOUSAND/UL (ref 4.31–10.16)
WBC #/AREA URNS AUTO: NORMAL /HPF

## 2022-03-11 PROCEDURE — G1004 CDSM NDSC: HCPCS

## 2022-03-11 PROCEDURE — 74176 CT ABD & PELVIS W/O CONTRAST: CPT

## 2022-03-11 PROCEDURE — 80053 COMPREHEN METABOLIC PANEL: CPT | Performed by: EMERGENCY MEDICINE

## 2022-03-11 PROCEDURE — 85025 COMPLETE CBC W/AUTO DIFF WBC: CPT | Performed by: EMERGENCY MEDICINE

## 2022-03-11 PROCEDURE — 81025 URINE PREGNANCY TEST: CPT | Performed by: EMERGENCY MEDICINE

## 2022-03-11 PROCEDURE — 96374 THER/PROPH/DIAG INJ IV PUSH: CPT

## 2022-03-11 PROCEDURE — 93005 ELECTROCARDIOGRAM TRACING: CPT

## 2022-03-11 PROCEDURE — 99284 EMERGENCY DEPT VISIT MOD MDM: CPT

## 2022-03-11 PROCEDURE — 81001 URINALYSIS AUTO W/SCOPE: CPT | Performed by: EMERGENCY MEDICINE

## 2022-03-11 PROCEDURE — 83690 ASSAY OF LIPASE: CPT | Performed by: EMERGENCY MEDICINE

## 2022-03-11 PROCEDURE — 36415 COLL VENOUS BLD VENIPUNCTURE: CPT

## 2022-03-11 PROCEDURE — 99284 EMERGENCY DEPT VISIT MOD MDM: CPT | Performed by: EMERGENCY MEDICINE

## 2022-03-11 RX ORDER — KETOROLAC TROMETHAMINE 30 MG/ML
30 INJECTION, SOLUTION INTRAMUSCULAR; INTRAVENOUS ONCE
Status: COMPLETED | OUTPATIENT
Start: 2022-03-11 | End: 2022-03-11

## 2022-03-11 RX ADMIN — KETOROLAC TROMETHAMINE 30 MG: 30 INJECTION, SOLUTION INTRAMUSCULAR; INTRAVENOUS at 17:46

## 2022-03-11 NOTE — ED PROVIDER NOTES
History  Chief Complaint   Patient presents with    Abdominal Pain     Pt with upper abd pain, hx of IBS, states "it might be my gallstone?"  +n/-v/-d     Patient with pmh panic attacks, anxiety, ibs, gallstone, complains of burning sensation upper abd gradual worsening since yesterday  Associated headache, sob, nausea, difficulty urinating, and panic  Patient denies d/c  Patient tried magnesium without relief  She felt like passing out  She has stress from issues with ex-  Prior to Admission Medications   Prescriptions Last Dose Informant Patient Reported? Taking? Multiple Vitamins-Minerals (MULTIVITAMIN ADULT) TABS 3/11/2022 at Unknown time Self Yes Yes   Sig: Take 1 tablet by mouth   benzonatate (TESSALON) 200 MG capsule Not Taking at Unknown time Self No No   Sig: Take 1 capsule (200 mg total) by mouth 3 (three) times a day as needed for cough   Patient not taking: Reported on 3/11/2022    dronabinol (MARINOL) 10 MG capsule Not Taking at Unknown time Self Yes No   Sig: Take 10 mg by mouth 2 (two) times a day before meals   Patient not taking: Reported on 3/11/2022    methylPREDNISolone 4 MG tablet therapy pack Not Taking at Unknown time  No No   Sig: Use as directed on package   Patient not taking: Reported on 3/11/2022    omeprazole (PriLOSEC) 20 mg delayed release capsule Not Taking at Unknown time Self No No   Sig: Take 1 capsule (20 mg total) by mouth daily   Patient not taking: Reported on 3/11/2022       Facility-Administered Medications: None       Past Medical History:   Diagnosis Date    Cholelithiasis     IBS (irritable bowel syndrome)     Iron deficiency anemia     Menorrhagia     Palpitations        Past Surgical History:   Procedure Laterality Date    WISDOM TOOTH EXTRACTION         Family History   Problem Relation Age of Onset    Heart failure Mother     Hypothyroidism Sister      I have reviewed and agree with the history as documented      E-Cigarette/Vaping    E-Cigarette Use Never User      E-Cigarette/Vaping Substances    Nicotine No     THC No     CBD No     Flavoring No     Other No     Unknown No      Social History     Tobacco Use    Smoking status: Former Smoker    Smokeless tobacco: Current User   Vaping Use    Vaping Use: Never used   Substance Use Topics    Alcohol use: Yes     Comment: socially    Drug use: Yes     Types: Marijuana     Comment: medical       Review of Systems   Constitutional: Negative for chills and fever  HENT: Negative for rhinorrhea and sore throat  Respiratory: Positive for shortness of breath  Cardiovascular: Negative for chest pain  Gastrointestinal: Positive for abdominal pain and nausea  Negative for constipation, diarrhea and vomiting  Genitourinary: Negative for dysuria and frequency  Skin: Negative for rash  Neurological: Positive for headaches  Psychiatric/Behavioral: Negative for suicidal ideas  The patient is nervous/anxious  All other systems reviewed and are negative  Physical Exam  Physical Exam  Vitals and nursing note reviewed  Constitutional:       Appearance: She is well-developed  HENT:      Head: Normocephalic and atraumatic  Right Ear: External ear normal       Left Ear: External ear normal       Nose: Nose normal    Eyes:      Conjunctiva/sclera: Conjunctivae normal       Pupils: Pupils are equal, round, and reactive to light  Cardiovascular:      Rate and Rhythm: Normal rate and regular rhythm  Heart sounds: Normal heart sounds  Pulmonary:      Effort: Pulmonary effort is normal  No respiratory distress  Breath sounds: Normal breath sounds  No wheezing  Abdominal:      General: Bowel sounds are normal  There is no distension  Palpations: Abdomen is soft  Tenderness: There is no abdominal tenderness  There is left CVA tenderness  There is no right CVA tenderness  Musculoskeletal:         General: No deformity  Normal range of motion  Cervical back: Normal range of motion and neck supple  No spinous process tenderness  Skin:     General: Skin is warm and dry  Findings: No rash  Neurological:      General: No focal deficit present  Mental Status: She is alert  GCS: GCS eye subscore is 4  GCS verbal subscore is 5  GCS motor subscore is 6  Sensory: No sensory deficit     Psychiatric:         Mood and Affect: Mood normal          Vital Signs  ED Triage Vitals [03/11/22 1617]   Temperature Pulse Respirations Blood Pressure SpO2   98 5 °F (36 9 °C) 94 16 118/77 100 %      Temp Source Heart Rate Source Patient Position - Orthostatic VS BP Location FiO2 (%)   Temporal Monitor Sitting Left arm --      Pain Score       5           Vitals:    03/11/22 1617 03/11/22 1830 03/11/22 2000   BP: 118/77 114/67 123/69   Pulse: 94 84 86   Patient Position - Orthostatic VS: Sitting Sitting Lying         Visual Acuity      ED Medications  Medications   ketorolac (TORADOL) injection 30 mg (30 mg Intravenous Given 3/11/22 1746)       Diagnostic Studies  Results Reviewed     Procedure Component Value Units Date/Time    Urine Microscopic [[id number]] Collected: 03/11/22 1750    Lab Status: Final result Specimen: Urine, Clean Catch Updated: 03/11/22 1821     RBC, UA 0-1 /hpf      WBC, UA 2-4 /hpf      Epithelial Cells Occasional /hpf      Bacteria, UA Occasional /hpf      AMORPH PHOSPATES Moderate /hpf     UA w Reflex to Microscopic w Reflex to Culture [[id number]]  (Abnormal) Collected: 03/11/22 1750    Lab Status: Final result Specimen: Urine, Clean Catch Updated: 03/11/22 1801     Color, UA Yellow     Clarity, UA Slightly Cloudy     Specific [place], UA 1 020     pH, UA 8 5     Leukocytes, UA Trace     Nitrite, UA Negative     Protein, UA Negative mg/dl      Glucose, UA Negative mg/dl      Ketones, UA Negative mg/dl      Urobilinogen, UA 0 2 E U /dl      Bilirubin, UA Negative     Blood, UA Negative    POCT pregnancy, urine [[id number]]  (Normal) Resulted: 03/11/22 1750    Lab Status: Final result Updated: 03/11/22 1750     EXT PREG TEST UR (Ref: Negative) NEGATIVE     Control VALID    Comprehensive metabolic panel [[id number]] Collected: 03/11/22 1707    Lab Status: Final result Specimen: Blood from Arm, Left Updated: 03/11/22 1739     Sodium 137 mmol/L      Potassium 4 0 mmol/L      Chloride 103 mmol/L      CO2 27 mmol/L      ANION GAP 7 mmol/L      BUN 11 mg/dL      Creatinine 0 64 mg/dL      Glucose 87 mg/dL      Calcium 9 2 mg/dL      AST 14 U/L      ALT 17 U/L      Alkaline Phosphatase 48 U/L      Total Protein 7 4 g/dL      Albumin 4 1 g/dL      Total Bilirubin 0 40 mg/dL      eGFR 114 ml/min/1 73sq m     Narrative:      National Kidney Disease Foundation guidelines for Chronic Kidney Disease (CKD):     Stage 1 with normal or high GFR (GFR > 90 mL/min/1 73 square meters)    Stage 2 Mild CKD (GFR = 60-89 mL/min/1 73 square meters)    Stage 3A Moderate CKD (GFR = 45-59 mL/min/1 73 square meters)    Stage 3B Moderate CKD (GFR = 30-44 mL/min/1 73 square meters)    Stage 4 Severe CKD (GFR = 15-29 mL/min/1 73 square meters)    Stage 5 End Stage CKD (GFR <15 mL/min/1 73 square meters)  Note: GFR calculation is accurate only with a steady state creatinine    Lipase [[id number]]  (Normal) Collected: 03/11/22 1707    Lab Status: Final result Specimen: Blood from Arm, Left Updated: 03/11/22 1739     Lipase 113 u/L     CBC and differential [[id number]] Collected: 03/11/22 1707    Lab Status: Final result Specimen: Blood from Arm, Left Updated: 03/11/22 1715     WBC 6 06 Thousand/uL      RBC 4 22 Million/uL      Hemoglobin 12 9 g/dL      Hematocrit 39 8 %      MCV 94 fL      MCH 30 6 pg      MCHC 32 4 g/dL      RDW 12 5 %      MPV 10 2 fL      Platelets 553 Thousands/uL      nRBC 0 /100 WBCs      Neutrophils Relative 63 %      Immat GRANS % 0 %      Lymphocytes Relative 25 %      Monocytes Relative 9 %      Eosinophils Relative 2 %      Basophils Relative 1 % Neutrophils Absolute 3 76 Thousands/µL      Immature Grans Absolute 0 01 Thousand/uL      Lymphocytes Absolute 1 54 Thousands/µL      Monocytes Absolute 0 54 Thousand/µL      Eosinophils Absolute 0 14 Thousand/µL      Basophils Absolute 0 07 Thousands/µL                  CT renal stone study abdomen pelvis wo contrast   Final Result by Adarsh Benavides DO (03/11 2011)      No obstructive uropathy or acute inflammatory process  Cholelithiasis without evidence of acute cholecystitis      0 3 cm left lower lobe pulmonary nodule  Based on current Fleischner Society 2017 Guidelines on incidental pulmonary nodule, no routine follow-up is needed if the patient is low risk  If the patient is high risk, optional follow-up chest CT at 12 months    can be considered  Workstation performed: [id number]                    Procedures  Procedures         ED Course                               SBIRT 22yo+      Most Recent Value   SBIRT (25 yo +)    In order to provide better care to our patients, we are screening all of our patients for alcohol and drug use  Would it be okay to ask you these screening questions? Yes Filed at: 03/11/2022 1659   Initial Alcohol Screen: US AUDIT-C     1  How often do you have a drink containing alcohol? 4 Filed at: 03/11/2022 1659   2  How many drinks containing alcohol do you have on a typical day you are drinking? 3 Filed at: 03/11/2022 1659   3a  Male UNDER 65: How often do you have five or more drinks on one occasion? 0 Filed at: 03/11/2022 1659   3b  FEMALE Any Age, or MALE 65+: How often do you have 4 or more drinks on one occassion? 0 Filed at: 03/11/2022 1659   Audit-C Score 7 Filed at: 03/11/2022 1659   Full Alcohol Screen: US AUDIT    4  How often during the last year have you found that you were not able to stop drinking once you had started? 0 Filed at: 03/11/2022 1659   5   How often during past year have you failed to do what was normally expected of you because of drinking? 0 Filed at: 03/11/2022 1659   6  How often in past year have you needed a first drink in the morning to get yourself going after a heavy drinking session? 0 Filed at: 03/11/2022 1659   7  How often in past year have you had feeling of guilt or remorse after drinking? 0 Filed at: 03/11/2022 1659   8  How often in past year have you been unable to remember what happened night before because you had been drinking? 0 Filed at: 03/11/2022 1659   9  Have you or someone else been injured as a result of your drinking? 0 Filed at: 03/11/2022 1659   10  Has a relative, friend, doctor or other health worker been concerned about your drinking and suggested you cut down?  0 Filed at: 03/11/2022 1659   AUDIT Total Score 7 Filed at: 03/11/2022 1659   CHANDA: How many times in the past year have you    Used an illegal drug or used a prescription medication for non-medical reasons? Never Filed at: 03/11/2022 1659                    MDM  Number of Diagnoses or Management Options  Abdominal pain: new and requires workup  Gallstones: established and worsening  Pulmonary nodule: minor     Amount and/or Complexity of Data Reviewed  Clinical lab tests: ordered and reviewed  Tests in the radiology section of CPT®: ordered and reviewed    Patient Progress  Patient progress: improved      Disposition  Final diagnoses:   Abdominal pain   Gallstones   Pulmonary nodule     Time reflects when diagnosis was documented in both MDM as applicable and the Disposition within this note     Time User Action Codes Description Comment    3/11/2022  8:42 PM Butts Picking Add [R10 9] Abdominal pain     3/11/2022  8:42 PM Butts Picking Add [K80 20] Gallstones     3/11/2022  8:42 PM Butts Picking Add [R91 1] Pulmonary nodule       ED Disposition     ED Disposition Condition Date/Time Comment    Discharge Stable Fri Mar 11, 2022  8:42 PM Sarah Bile discharge to home/self care              Follow-up Information     Follow up With Specialties Details Why Contact Info    Yahaira Hdz DO Family Medicine Call  As needed, If symptoms worsen [phone] [facility] [address] [facility]  [phone]            Discharge Medication List as of 3/11/2022  9:21 PM      CONTINUE these medications which have NOT CHANGED    Details   Multiple Vitamins-Minerals (MULTIVITAMIN ADULT) TABS Take 1 tablet by mouth, Historical Med      benzonatate (TESSALON) 200 MG capsule Take 1 capsule (200 mg total) by mouth 3 (three) times a day as needed for cough, Starting Wed 5/5/2021, Normal      dronabinol (MARINOL) 10 MG capsule Take 10 mg by mouth 2 (two) times a day before meals, Historical Med      methylPREDNISolone 4 MG tablet therapy pack Use as directed on package, Normal      omeprazole (PriLOSEC) 20 mg delayed release capsule Take 1 capsule (20 mg total) by mouth daily, Starting Fri 10/22/2021, Normal             No discharge procedures on file      PDMP Review     None          ED Provider  Electronically Signed by           Brad Escobedo DO  03/12/22 1997

## 2022-03-11 NOTE — Clinical Note
Kalpana Bernard was seen and treated in our emergency department on 3/11/2022  Diagnosis:     Cye[place]    She may return on this date:     [address] 3-11-22     If you have any questions or concerns, please don't hesitate to call        Elvis Jarvis, DO    ______________________________           _______________          _______________  Hospital Representative                              Date                                Time

## 2022-03-12 LAB
ATRIAL RATE: 62 BPM
P AXIS: 71 DEGREES
PR INTERVAL: 128 MS
QRS AXIS: 76 DEGREES
QRSD INTERVAL: 82 MS
QT INTERVAL: 414 MS
QTC INTERVAL: 420 MS
T WAVE AXIS: 67 DEGREES
VENTRICULAR RATE: 62 BPM

## 2022-03-12 PROCEDURE — 93010 ELECTROCARDIOGRAM REPORT: CPT | Performed by: INTERNAL MEDICINE

## 2022-03-15 ENCOUNTER — TELEPHONE (OUTPATIENT)
Dept: FAMILY MEDICINE CLINIC | Facility: CLINIC | Age: 37
End: 2022-03-15

## 2022-03-15 DIAGNOSIS — K80.50 BILIARY COLIC: ICD-10-CM

## 2022-03-15 DIAGNOSIS — K80.80 BILIARY CALCULUS OF OTHER SITE WITHOUT OBSTRUCTION: Primary | ICD-10-CM

## 2022-03-15 NOTE — TELEPHONE ENCOUNTER
David Moeller was wondering if you can please place an ambulatory referral for her to see General Surgery   Shayne García MD  She has an appointment already  Thank you

## 2022-03-17 ENCOUNTER — HOSPITAL ENCOUNTER (OUTPATIENT)
Dept: ULTRASOUND IMAGING | Facility: HOSPITAL | Age: 37
Discharge: HOME | End: 2022-03-17
Payer: COMMERCIAL

## 2022-03-17 DIAGNOSIS — N93.0 POSTCOITAL BLEEDING: ICD-10-CM

## 2022-03-17 PROCEDURE — 76830 TRANSVAGINAL US NON-OB: CPT

## 2022-03-17 PROCEDURE — 76856 US EXAM PELVIC COMPLETE: CPT

## 2022-03-21 ENCOUNTER — OFFICE VISIT (OUTPATIENT)
Dept: SURGERY | Facility: HOSPITAL | Age: 37
End: 2022-03-21
Payer: COMMERCIAL

## 2022-03-21 VITALS
DIASTOLIC BLOOD PRESSURE: 82 MMHG | WEIGHT: 150 LBS | HEIGHT: 64 IN | HEART RATE: 65 BPM | SYSTOLIC BLOOD PRESSURE: 120 MMHG | TEMPERATURE: 97.9 F | BODY MASS INDEX: 25.61 KG/M2 | RESPIRATION RATE: 16 BRPM

## 2022-03-21 DIAGNOSIS — K80.50 BILIARY COLIC: ICD-10-CM

## 2022-03-21 DIAGNOSIS — K80.80 BILIARY CALCULUS OF OTHER SITE WITHOUT OBSTRUCTION: ICD-10-CM

## 2022-03-21 PROCEDURE — 1036F TOBACCO NON-USER: CPT | Performed by: SURGERY

## 2022-03-21 PROCEDURE — 99243 OFF/OP CNSLTJ NEW/EST LOW 30: CPT | Performed by: SURGERY

## 2022-03-21 PROCEDURE — 3008F BODY MASS INDEX DOCD: CPT | Performed by: SURGERY

## 2022-03-23 RX ORDER — CEFAZOLIN SODIUM 2 G/50ML
2000 SOLUTION INTRAVENOUS ONCE
Status: CANCELLED | OUTPATIENT
Start: 2022-04-12

## 2022-03-23 RX ORDER — SODIUM CHLORIDE, SODIUM LACTATE, POTASSIUM CHLORIDE, CALCIUM CHLORIDE 600; 310; 30; 20 MG/100ML; MG/100ML; MG/100ML; MG/100ML
125 INJECTION, SOLUTION INTRAVENOUS CONTINUOUS
Status: CANCELLED | OUTPATIENT
Start: 2022-04-12

## 2022-03-23 NOTE — PROGRESS NOTES
Assessment/Plan:   Larry Zuñiga is a 40 y  o female who is here for Cholelithiasis (New patient consult for evaluation of cholelithiasis  Diagnosed for same back in 2015 at [facility]  Seen on [id number] at [facility] ER for RUQ pain  CT study did note cholelithiasis )    Plan: Chronic cholecystitis - discussed operative vs conservative mgt, surgical approaches, risks and benefits and patient understands that her pain not resolve after surgery  Pt understands and wishes to proceed with laparoscopic cholecystectomy    Preoperative Clearance: None    HPI:  Larry Zuñiga is a 40 y  o female who was referred for evaluation of Cholelithiasis (New patient consult for evaluation of cholelithiasis  Diagnosed for same back in 2015 at [facility]  Seen on [id number] at [facility] ER for RUQ pain  CT study did note cholelithiasis )    Currently having postprandial RUQ pain       ROS:  General ROS: negative  negative for - chills, fatigue, fever or night sweats, weight loss  Respiratory ROS: no cough, shortness of breath, or wheezing  Cardiovascular ROS: no chest pain or dyspnea on exertion  Genito-Urinary ROS: no dysuria, trouble voiding, or hematuria  Musculoskeletal ROS: negative for - gait disturbance, joint pain or muscle pain  Neurological ROS: no TIA or stroke symptoms  abdominal pain    [unfilled]  Lubiprostone    Current Outpatient Medications:     Multiple Vitamins-Minerals (MULTIVITAMIN ADULT) TABS, Take 1 tablet by mouth, Disp: , Rfl:     benzonatate (TESSALON) 200 MG capsule, Take 1 capsule (200 mg total) by mouth 3 (three) times a day as needed for cough (Patient not taking: Reported on 3/11/2022 ), Disp: 20 capsule, Rfl: 0    dronabinol (MARINOL) 10 MG capsule, Take 10 mg by mouth 2 (two) times a day before meals (Patient not taking: Reported on 3/11/2022 ), Disp: , Rfl:     methylPREDNISolone 4 MG tablet therapy pack, Use as directed on package (Patient not taking: Reported on 3/11/2022 ), Disp: 21 each, Rfl: 0    omeprazole (PriLOSEC) 20 mg delayed release capsule, Take 1 capsule (20 mg total) by mouth daily (Patient not taking: Reported on 3/11/2022 ), Disp: 14 capsule, Rfl: 0  Past Medical History:   Diagnosis Date    Cholelithiasis     COVID 10/10/2021    IBS (irritable bowel syndrome)     Iron deficiency anemia     Menorrhagia     Palpitations      Past Surgical History:   Procedure Laterality Date    WISDOM TOOTH EXTRACTION       Family History   Problem Relation Age of Onset    Heart failure Mother     Hypothyroidism Sister       reports that she has quit smoking  She uses smokeless tobacco  She reports current alcohol use  She reports current drug use  Drug: Marijuana  Labs:   Lab Results   Component Value Date    WBC 6 06 03/11/2022    WBC 5 0 10/06/2021    WBC 6 24 01/27/2020    WBC 4 4 06/25/2019    WBC 5 5 10/16/2018    WBC 0-5 09/12/2017    WBC 5 7 09/12/2017    HGB 12 9 03/11/2022    HGB 12 7 10/06/2021    HGB 13 4 01/27/2020    HGB 13 1 06/25/2019    HGB 14 4 10/16/2018    HGB 12 6 09/12/2017     03/11/2022     10/06/2021     01/27/2020     06/25/2019     10/16/2018     09/12/2017     Lab Results   Component Value Date    ALT 17 03/11/2022    ALT 13 10/06/2021    ALT 16 01/27/2020    ALT 13 06/25/2019    ALT 11 10/16/2018    AST 14 03/11/2022    AST 20 10/06/2021    AST 19 01/27/2020    AST 18 06/25/2019    AST 18 10/16/2018     This SmartLink has not been configured with any valid records         PHYSICAL EXAM  General Appearance:    Alert, cooperative, no distress,    Head:    Normocephalic without obvious abnormality   Eyes:    PERRL, conjunctiva/corneas clear, EOM's intact        Neck:   Supple, no adenopathy, no JVD   Back:     Symmetric, no spinal or CVA tenderness   Lungs:     Clear to auscultation bilaterally, no wheezing or rhonchi   Heart:    Regular rate and rhythm, S1 and S2 normal, no murmur   Abdomen:     Soft +BS ND NT   Extremities:   Extremities normal  No clubbing, cyanosis or edema   Psych:   Normal Affect, AOx3  Neurologic:  Skin:   CNII-XII intact  Strength symmetric, speech intact    Warm, dry, intact, no visible rashes or lesions         Physical Exam              Some portions of this record may have been generated with voice recognition software  There may be translation, syntax,  or grammatical errors  Occasional wrong word or "sound-a-like" substitutions may have occurred due to the inherent limitations of the voice recognition software  Read the chart carefully and recognize, using context, where substitutions may have occurred  If you have any questions, please contact the dictating provider for clarification or correction, as needed  This encounter has been coded by a non-certified coder

## 2022-05-18 ENCOUNTER — OFFICE VISIT (OUTPATIENT)
Dept: GASTROENTEROLOGY | Facility: CLINIC | Age: 37
End: 2022-05-18
Payer: COMMERCIAL

## 2022-05-18 VITALS
HEIGHT: 64 IN | WEIGHT: 156 LBS | DIASTOLIC BLOOD PRESSURE: 60 MMHG | SYSTOLIC BLOOD PRESSURE: 90 MMHG | BODY MASS INDEX: 26.63 KG/M2

## 2022-05-18 DIAGNOSIS — K80.20 CALCULUS OF GALLBLADDER WITHOUT CHOLECYSTITIS WITHOUT OBSTRUCTION: ICD-10-CM

## 2022-05-18 DIAGNOSIS — F41.9 ANXIETY: ICD-10-CM

## 2022-05-18 DIAGNOSIS — R14.0 BLOATING SYMPTOM: ICD-10-CM

## 2022-05-18 DIAGNOSIS — Z12.11 COLON CANCER SCREENING: ICD-10-CM

## 2022-05-18 DIAGNOSIS — K58.1 IRRITABLE BOWEL SYNDROME WITH CONSTIPATION: Primary | ICD-10-CM

## 2022-05-18 PROCEDURE — 99203 OFFICE O/P NEW LOW 30 MIN: CPT | Performed by: INTERNAL MEDICINE

## 2022-05-18 PROCEDURE — 1036F TOBACCO NON-USER: CPT | Performed by: INTERNAL MEDICINE

## 2022-05-18 PROCEDURE — 3008F BODY MASS INDEX DOCD: CPT | Performed by: INTERNAL MEDICINE

## 2022-05-18 RX ORDER — DICYCLOMINE HYDROCHLORIDE 10 MG/1
10 CAPSULE ORAL 3 TIMES DAILY PRN
Qty: 90 CAPSULE | Refills: 2 | Status: SHIPPED | OUTPATIENT
Start: 2022-05-18 | End: 2022-06-17

## 2022-05-18 RX ORDER — POLYETHYLENE GLYCOL 3350 17 G/17G
POWDER, FOR SOLUTION ORAL
Qty: 510 G | Refills: 3 | Status: SHIPPED | OUTPATIENT
Start: 2022-05-18

## 2022-05-18 NOTE — PROGRESS NOTES
[facility] - Outpatient Consultation  Michael Ornelas 40 y o  female MRN: [id number]  Encounter: [id number]    ASSESSMENT AND PLAN:      1  Irritable bowel syndrome with constipation  --Patient with IBS predominant with constipation  At times she does very well with normal bowel movements  Subsequently  She will have a couple of weeks out of a month where she has abdominal plain, bloating, and erratic and incomplete bowel movements  She has a sense that she has not fully evacuated  Has been diagnosed with irritable bowel syndrome in the past   Agents such as Trulance have been too harsh for the patient  She has had several ER visits  Suspect the patient does have IBS CR IBS alternating C and normal bowel movements  Check routine screening  I do not believe she needs imaging at this time    - CBC and differential; Future  - Comprehensive metabolic panel; Future  - Celiac Disease Panel; Future  - C-reactive protein; Future  - Occult Blood, Fecal Immunochemical; Future  - dicyclomine (BENTYL) 10 mg capsule; Take 1 capsule (10 mg total) by mouth as needed in the morning and 1 capsule (10 mg total) as needed at noon and 1 capsule (10 mg total) as needed in the evening (abdominal pain)  Dispense: 90 capsule; Refill: 2     - the patient is advised to take Metamucil or Benefiber 1 tbsp with 8 oz water or juice daily at the time when she approaches her menses as this seems to be related to some of her symptoms   -  Alternatively she can have MiraLax 17 g or 8 5 g daily as needed    - Bentyl above can be taken as needed  - could consider amitriptyline 10 mg at bedtime  Hold off for now this is a serotonin blocker  Generally better for IBS D      2  Calculus of gallbladder without cholecystitis without obstruction  - patient has known gallstones  Does not seem to have postprandial abdominal pain or upper abdominal pain  Gallstones seem to be incidental    3   Anxiety  -- patient reports likely contributing to IBS symptoms    4  Bloating symptom  -- bloating abdominal discomfort part and parcel of the IBS  Did inquire about gyn issues  Patient is seeing Gynecology  She is due for pelvic ultrasound  5  Colon cancer screening-patient at average risk - has chronic symptoms --  No indication for colonoscopy at this time -  Check FIT as a basic screen for symptoms         Followup Appointment: 3-4 mo   ______________________________________________________________________    Chief Complaint   Patient presents with    Irritable Bowel Syndrome       HPI:   Ayo Mixon is a 40y o  year old female who presents GI symptom including intermittent problems with constipation, lower abdominal pain, and abdominal bloating  Patient will have normal bowel movements for period of time and then subsequently developed firm ribbon like stools and a sense of incomplete evacuation  She has never really noticed any blood per rectum  She has been diagnosed with irritable bowel syndrome past   She is moving her bowels regularly she does not have significant difficulties  She notices that things seem to get worse as menstrual cycle approaches  She may have problems about 2 weeks out of the month or so  Patient has not had any weight loss  No nocturnal symptoms  Family history negative for IBD or colorectal cancer  She has been diagnosed with irritable bowel syndrome in the past   In the past patient has taken laxatives including Amitiza and Trulance will make her symptoms worse  She has fears about having a colonoscopy because she has concerned about affects of the prep that will have on her  If she has lower abdominal pain and bloating primarily located in lower abdomen associated with cramping  More gentle approaches such as stool softeners and supplemental fiber seem to be better for her  Patient does have a history of cholelithiasis noted on previous imaging studies    This was ascertained well back in 2015  Patient did have ED visit back in March of this year and was referred for evaluation for possible cholecystectomy  Procedure was actually scheduled but then subsequently canceled  Patient present denies any postprandial upper abdominal pain  With respect to the bloating and lower abdominal discomfort patient has been seen gyn  She did have a recent pelvic ultrasound she reports is negative  She has ongoing gyn follow-up  Historical Information   Past Medical History:   Diagnosis Date    Cholelithiasis     COVID 10/10/2021    IBS (irritable bowel syndrome)     Iron deficiency anemia     Menorrhagia     Palpitations      Past Surgical History:   Procedure Laterality Date    WISDOM TOOTH EXTRACTION       Social History     Substance and Sexual Activity   Alcohol Use Yes    Comment: socially     Social History     Substance and Sexual Activity   Drug Use Yes    Types: Marijuana    Comment: medical     Social History     Tobacco Use   Smoking Status Former Smoker   Smokeless Tobacco Current User     Family History   Problem Relation Age of Onset    Heart failure Mother     Hypothyroidism Sister        Meds/Allergies     Current Outpatient Medications:     dicyclomine (BENTYL) 10 mg capsule    Multiple Vitamins-Minerals (MULTIVITAMIN ADULT) TABS    Allergies   Allergen Reactions    Lubiprostone Nausea Only and GI Intolerance       PHYSICAL EXAM:    Blood pressure 90/60, height 5' 4" (1 626 m), weight 70 8 kg (156 lb), not currently breastfeeding  Body mass index is 26 78 kg/m²  General Appearance: NAD, cooperative, alert  Eyes: Anicteric, conjunctiva pink  ENT:  Normocephalic, atraumatic, normal mucosa  Neck:  Supple, symmetrical, trachea midline,   Resp:  Clear to auscultation bilaterally; no rales, rhonchi or wheezing; respirations unlabored   CV:  S1 S2, Regular rate and rhythm; no murmur, rub, or gallop    GI:  Soft, non-tender, non-distended; normal bowel sounds; no masses, no organomegaly   Rectal: Deferred  Musculoskeletal: No cyanosis, clubbing or edema  Normal ROM  Skin:  No jaundice, rashes, or lesions   Heme/Lymph: No palpable cervical lymphadenopathy  Psych: Normal affect, good eye contact  Neuro: No gross deficits, AAOx3    Lab Results:   Lab Results   Component Value Date    WBC 6 06 03/11/2022    HGB 12 9 03/11/2022    HCT 39 8 03/11/2022    MCV 94 03/11/2022     03/11/2022     Lab Results   Component Value Date     09/12/2017    K 4 0 03/11/2022     03/11/2022    CO2 27 03/11/2022    BUN 11 03/11/2022    CREATININE 0 64 03/11/2022    GLUCOSE 88 09/12/2017    CALCIUM 9 2 03/11/2022    AST 14 03/11/2022    ALT 17 03/11/2022    ALKPHOS 48 03/11/2022    PROT 6 4 09/12/2017    BILITOT 0 6 09/12/2017    EGFR 114 03/11/2022     No results found for: IRON, TIBC, FERRITIN  Lab Results   Component Value Date    LIPASE 113 03/11/2022         REVIEW OF SYSTEMS:    CONSTITUTIONAL: Denies any fever, chills, rigors, and weight loss  HEENT: No earache or tinnitus  Denies hearing loss or visual disturbances  CARDIOVASCULAR: No chest pain or palpitations  RESPIRATORY: Denies any cough, hemoptysis, shortness of breath or dyspnea on exertion  GASTROINTESTINAL: As noted in the History of Present Illness  GENITOURINARY: No problems with urination  Denies any hematuria or dysuria  NEUROLOGIC: No dizziness or vertigo, denies headaches  MUSCULOSKELETAL: Denies any muscle or joint pain  SKIN: Denies skin rashes or itching  ENDOCRINE: Denies excessive thirst  Denies intolerance to heat or cold  PSYCHOSOCIAL:  Positive for anxiety   Denies any recent memory loss

## 2022-05-18 NOTE — LETTER
May 20, 2022     Li Marker, [address]    Patient: Magdalena Vitale   YOB: 1985   Date of Visit: 5/18/2022       Dear Dr Vern Calixto: Thank you for referring Magdalena Vitale to me for evaluation  Below are my notes for this consultation  If you have questions, please do not hesitate to call me  I look forward to following your patient along with you  Sincerely,        Fabiano Barboza MD        CC: No Recipients  Fabiano Barboza MD  5/20/2022  9:15 AM  Incomplete    [address] [facility] - Outpatient Consultation  Magdalena Vitale 40 y o  female MRN: [id number]  Encounter: [id number]    ASSESSMENT AND PLAN:      1  Irritable bowel syndrome with constipation  --Patient with IBS predominant with constipation  At times she does very well with normal bowel movements  Subsequently  She will have a couple of weeks out of a month where she has abdominal plain, bloating, and erratic and incomplete bowel movements  She has a sense that she has not fully evacuated  Has been diagnosed with irritable bowel syndrome in the past   Agents such as Trulance have been too harsh for the patient  She has had several ER visits  Suspect the patient does have IBS CR IBS alternating C and normal bowel movements  Check routine screening  I do not believe she needs imaging at this time    - CBC and differential; Future  - Comprehensive metabolic panel; Future  - Celiac Disease Panel; Future  - C-reactive protein; Future  - Occult Blood, Fecal Immunochemical; Future  - dicyclomine (BENTYL) 10 mg capsule; Take 1 capsule (10 mg total) by mouth as needed in the morning and 1 capsule (10 mg total) as needed at noon and 1 capsule (10 mg total) as needed in the evening (abdominal pain)  Dispense: 90 capsule;  Refill: 2     - the patient is advised to take Metamucil or Benefiber 1 tbsp with 8 oz water or juice daily at the time when she approaches her menses as this seems to be related to some of her symptoms   -  Alternatively she can have MiraLax 17 g or 8 5 g daily as needed    - Bentyl above can be taken as needed  - could consider amitriptyline 10 mg at bedtime  Hold off for now this is a serotonin blocker  Generally better for IBS D      2  Calculus of gallbladder without cholecystitis without obstruction  - patient has known gallstones  Does not seem to have postprandial abdominal pain or upper abdominal pain  Gallstones seem to be incidental    3  Anxiety  -- patient reports likely contributing to IBS symptoms    4  Bloating symptom  -- bloating abdominal discomfort part and parcel of the IBS  Did inquire about gyn issues  Patient is seeing Gynecology  She is due for pelvic ultrasound  5  Colon cancer screening-patient at average risk - has chronic symptoms --  No indication for colonoscopy at this time -  Check FIT as a basic screen for symptoms         Followup Appointment: 3-4 mo   ______________________________________________________________________    Chief Complaint   Patient presents with    Irritable Bowel Syndrome       HPI:   Ayo Mixon is a 40y o  year old female who presents GI symptom including intermittent problems with constipation, lower abdominal pain, and abdominal bloating  Patient will have normal bowel movements for period of time and then subsequently developed firm ribbon like stools and a sense of incomplete evacuation  She has never really noticed any blood per rectum  She has been diagnosed with irritable bowel syndrome past   She is moving her bowels regularly she does not have significant difficulties  She notices that things seem to get worse as menstrual cycle approaches  She may have problems about 2 weeks out of the month or so  Patient has not had any weight loss  No nocturnal symptoms  Family history negative for IBD or colorectal cancer    She has been diagnosed with irritable bowel syndrome in the past   In the past patient has taken laxatives including Amitiza and Trulance will make her symptoms worse  She has fears about having a colonoscopy because she has concerned about affects of the prep that will have on her  If she has lower abdominal pain and bloating primarily located in lower abdomen associated with cramping  More gentle approaches such as stool softeners and supplemental fiber seem to be better for her  Patient does have a history of cholelithiasis noted on previous imaging studies  This was ascertained well back in 2015  Patient did have ED visit back in March of this year and was referred for evaluation for possible cholecystectomy  Procedure was actually scheduled but then subsequently canceled  Patient present denies any postprandial upper abdominal pain  With respect to the bloating and lower abdominal discomfort patient has been seen gyn  She did have a recent pelvic ultrasound she reports is negative  She has ongoing gyn follow-up      Historical Information   Past Medical History:   Diagnosis Date    Cholelithiasis     COVID 10/10/2021    IBS (irritable bowel syndrome)     Iron deficiency anemia     Menorrhagia     Palpitations      Past Surgical History:   Procedure Laterality Date    WISDOM TOOTH EXTRACTION       Social History     Substance and Sexual Activity   Alcohol Use Yes    Comment: socially     Social History     Substance and Sexual Activity   Drug Use Yes    Types: Marijuana    Comment: medical     Social History     Tobacco Use   Smoking Status Former Smoker   Smokeless Tobacco Current User     Family History   Problem Relation Age of Onset    Heart failure Mother     Hypothyroidism Sister        Meds/Allergies     Current Outpatient Medications:     dicyclomine (BENTYL) 10 mg capsule    Multiple Vitamins-Minerals (MULTIVITAMIN ADULT) TABS    Allergies   Allergen Reactions    Lubiprostone Nausea Only and GI Intolerance       PHYSICAL EXAM: Blood pressure 90/60, height 5' 4" (1 626 m), weight 70 8 kg (156 lb), not currently breastfeeding  Body mass index is 26 78 kg/m²  General Appearance: NAD, cooperative, alert  Eyes: Anicteric, conjunctiva pink  ENT:  Normocephalic, atraumatic, normal mucosa  Neck:  Supple, symmetrical, trachea midline,   Resp:  Clear to auscultation bilaterally; no rales, rhonchi or wheezing; respirations unlabored   CV:  S1 S2, Regular rate and rhythm; no murmur, rub, or gallop  GI:  Soft, non-tender, non-distended; normal bowel sounds; no masses, no organomegaly   Rectal: Deferred  Musculoskeletal: No cyanosis, clubbing or edema  Normal ROM  Skin:  No jaundice, rashes, or lesions   Heme/Lymph: No palpable cervical lymphadenopathy  Psych: Normal affect, good eye contact  Neuro: No gross deficits, AAOx3    Lab Results:   Lab Results   Component Value Date    WBC 6 06 03/11/2022    HGB 12 9 03/11/2022    HCT 39 8 03/11/2022    MCV 94 03/11/2022     03/11/2022     Lab Results   Component Value Date     09/12/2017    K 4 0 03/11/2022     03/11/2022    CO2 27 03/11/2022    BUN 11 03/11/2022    CREATININE 0 64 03/11/2022    GLUCOSE 88 09/12/2017    CALCIUM 9 2 03/11/2022    AST 14 03/11/2022    ALT 17 03/11/2022    ALKPHOS 48 03/11/2022    PROT 6 4 09/12/2017    BILITOT 0 6 09/12/2017    EGFR 114 03/11/2022     No results found for: IRON, TIBC, FERRITIN  Lab Results   Component Value Date    LIPASE 113 03/11/2022         REVIEW OF SYSTEMS:    CONSTITUTIONAL: Denies any fever, chills, rigors, and weight loss  HEENT: No earache or tinnitus  Denies hearing loss or visual disturbances  CARDIOVASCULAR: No chest pain or palpitations  RESPIRATORY: Denies any cough, hemoptysis, shortness of breath or dyspnea on exertion  GASTROINTESTINAL: As noted in the History of Present Illness  GENITOURINARY: No problems with urination  Denies any hematuria or dysuria  NEUROLOGIC: No dizziness or vertigo, denies headaches  MUSCULOSKELETAL: Denies any muscle or joint pain  SKIN: Denies skin rashes or itching  ENDOCRINE: Denies excessive thirst  Denies intolerance to heat or cold  PSYCHOSOCIAL:  Positive for anxiety   Denies any recent memory loss  Anel Hayward MD  5/18/2022  2:24 PM  Sign when Signing Visit    [facility] - Outpatient Consultation  [facility] 40 y o  female MRN: [id number]  Encounter: [id number]    ASSESSMENT AND PLAN:      1  Irritable bowel syndrome with constipation  --Patient with IBS predominant with constipation  At times she does very well with normal bowel movements  Subsequently  She will have a couple of weeks out of a month where she has abdominal plain, bloating, and erratic and incomplete bowel movements  She has a sense that she has not fully evacuated  Has been diagnosed with irritable bowel syndrome in the past   Agents such as Trulance have been too harsh for the patient  She has had several ER visits  Suspect the patient does have IBS CR IBS alternating C and normal bowel movements  Check routine screening  I do not believe she needs imaging at this time    - CBC and differential; Future  - Comprehensive metabolic panel; Future  - Celiac Disease Panel; Future  - C-reactive protein; Future  - Occult Blood, Fecal Immunochemical; Future  - dicyclomine (BENTYL) 10 mg capsule; Take 1 capsule (10 mg total) by mouth as needed in the morning and 1 capsule (10 mg total) as needed at noon and 1 capsule (10 mg total) as needed in the evening (abdominal pain)  Dispense: 90 capsule; Refill: 2     - the patient is advised to take Metamucil or Benefiber 1 tbsp with 8 oz water or juice daily at the time when she approaches her menses as this seems to be related to some of her symptoms   -  Alternatively she can have MiraLax 17 g or 8 5 g daily as needed    - Bentyl above can be taken as needed  - could consider amitriptyline 10 mg at bedtime    Hold off for now this is a serotonin blocker  Generally better for IBS D      2  Calculus of gallbladder without cholecystitis without obstruction  - patient has known gallstones  Does not seem to have postprandial abdominal pain or upper abdominal pain  Gallstones seem to be incidental    3  Anxiety  -- patient reports likely contributing to IBS symptoms    4  Bloating symptom  -- bloating abdominal discomfort part and parcel of the IBS  Did inquire about gyn issues  Patient is seeing Gynecology  She is due for pelvic ultrasound  Followup Appointment: 3-4 mo   ______________________________________________________________________    Chief Complaint   Patient presents with    Irritable Bowel Syndrome       HPI:   Michael Ornelas is a 40y o  year old female who presents ***    Historical Information   Past Medical History:   Diagnosis Date    Cholelithiasis     COVID 10/10/2021    IBS (irritable bowel syndrome)     Iron deficiency anemia     Menorrhagia     Palpitations      Past Surgical History:   Procedure Laterality Date    WISDOM TOOTH EXTRACTION       Social History     Substance and Sexual Activity   Alcohol Use Yes    Comment: socially     Social History     Substance and Sexual Activity   Drug Use Yes    Types: Marijuana    Comment: medical     Social History     Tobacco Use   Smoking Status Former Smoker   Smokeless Tobacco Current User     Family History   Problem Relation Age of Onset    Heart failure Mother     Hypothyroidism Sister        Meds/Allergies     Current Outpatient Medications:     dicyclomine (BENTYL) 10 mg capsule    Multiple Vitamins-Minerals (MULTIVITAMIN ADULT) TABS    Allergies   Allergen Reactions    Lubiprostone Nausea Only and GI Intolerance       PHYSICAL EXAM:    Blood pressure 90/60, height 5' 4" (1 626 m), weight 70 8 kg (156 lb), not currently breastfeeding  Body mass index is 26 78 kg/m²    General Appearance: NAD, cooperative, alert  Eyes: Anicteric, PERRLA, EOMI  ENT:  Normocephalic, atraumatic, normal mucosa  Neck:  Supple, symmetrical, trachea midline,   Resp:  Clear to auscultation bilaterally; no rales, rhonchi or wheezing; respirations unlabored   CV:  S1 S2, Regular rate and rhythm; no murmur, rub, or gallop  GI:  Soft, non-tender, non-distended; normal bowel sounds; no masses, no organomegaly   Rectal: Deferred  Musculoskeletal: No cyanosis, clubbing or edema  Normal ROM  Skin:  No jaundice, rashes, or lesions   Heme/Lymph: No palpable cervical lymphadenopathy  Psych: Normal affect, good eye contact  Neuro: No gross deficits, AAOx3    Lab Results:   Lab Results   Component Value Date    WBC 6 06 03/11/2022    HGB 12 9 03/11/2022    HCT 39 8 03/11/2022    MCV 94 03/11/2022     03/11/2022     Lab Results   Component Value Date     09/12/2017    K 4 0 03/11/2022     03/11/2022    CO2 27 03/11/2022    BUN 11 03/11/2022    CREATININE 0 64 03/11/2022    GLUCOSE 88 09/12/2017    CALCIUM 9 2 03/11/2022    AST 14 03/11/2022    ALT 17 03/11/2022    ALKPHOS 48 03/11/2022    PROT 6 4 09/12/2017    BILITOT 0 6 09/12/2017    EGFR 114 03/11/2022     No results found for: IRON, TIBC, FERRITIN  Lab Results   Component Value Date    LIPASE 113 03/11/2022         REVIEW OF SYSTEMS:    CONSTITUTIONAL: Denies any fever, chills, rigors, and weight loss  HEENT: No earache or tinnitus  Denies hearing loss or visual disturbances  CARDIOVASCULAR: No chest pain or palpitations  RESPIRATORY: Denies any cough, hemoptysis, shortness of breath or dyspnea on exertion  GASTROINTESTINAL: As noted in the History of Present Illness  GENITOURINARY: No problems with urination  Denies any hematuria or dysuria  NEUROLOGIC: No dizziness or vertigo, denies headaches  MUSCULOSKELETAL: Denies any muscle or joint pain  SKIN: Denies skin rashes or itching  ENDOCRINE: Denies excessive thirst  Denies intolerance to heat or cold  PSYCHOSOCIAL:  Positive for anxiety   Denies any recent memory loss

## 2022-05-18 NOTE — LETTER
May 20, 2022     Mandi Lugo, [address]    Patient: Olinda Boyle   YOB: 1985   Date of Visit: 5/18/2022       Dear Dr Yodit Chanel: Thank you for referring Olinda Boyle to me for evaluation  Below are my notes for this consultation  If you have questions, please do not hesitate to call me  I look forward to following your patient along with you  Sincerely,        Anel Hayward MD        CC: No Recipients  Anel Hayward MD  5/20/2022  9:16 AM  Sign when Signing Visit    [facility] - Outpatient Consultation  Olinda Boyle 40 y o  female MRN: [id number]  Encounter: [id number]    ASSESSMENT AND PLAN:      1  Irritable bowel syndrome with constipation  --Patient with IBS predominant with constipation  At times she does very well with normal bowel movements  Subsequently  She will have a couple of weeks out of a month where she has abdominal plain, bloating, and erratic and incomplete bowel movements  She has a sense that she has not fully evacuated  Has been diagnosed with irritable bowel syndrome in the past   Agents such as Trulance have been too harsh for the patient  She has had several ER visits  Suspect the patient does have IBS CR IBS alternating C and normal bowel movements  Check routine screening  I do not believe she needs imaging at this time    - CBC and differential; Future  - Comprehensive metabolic panel; Future  - Celiac Disease Panel; Future  - C-reactive protein; Future  - Occult Blood, Fecal Immunochemical; Future  - dicyclomine (BENTYL) 10 mg capsule; Take 1 capsule (10 mg total) by mouth as needed in the morning and 1 capsule (10 mg total) as needed at noon and 1 capsule (10 mg total) as needed in the evening (abdominal pain)  Dispense: 90 capsule;  Refill: 2     - the patient is advised to take Metamucil or Benefiber 1 tbsp with 8 oz water or juice daily at the time when she approaches her menses as this seems to be related to some of her symptoms   -  Alternatively she can have MiraLax 17 g or 8 5 g daily as needed    - Bentyl above can be taken as needed  - could consider amitriptyline 10 mg at bedtime  Hold off for now this is a serotonin blocker  Generally better for IBS D      2  Calculus of gallbladder without cholecystitis without obstruction  - patient has known gallstones  Does not seem to have postprandial abdominal pain or upper abdominal pain  Gallstones seem to be incidental    3  Anxiety  -- patient reports likely contributing to IBS symptoms    4  Bloating symptom  -- bloating abdominal discomfort part and parcel of the IBS  Did inquire about gyn issues  Patient is seeing Gynecology  She is due for pelvic ultrasound  5  Colon cancer screening-patient at average risk - has chronic symptoms --  No indication for colonoscopy at this time -  Check FIT as a basic screen for symptoms         Followup Appointment: 3-4 mo   ______________________________________________________________________    Chief Complaint   Patient presents with    Irritable Bowel Syndrome       HPI:   Anupam Lake is a 40y o  year old female who presents GI symptom including intermittent problems with constipation, lower abdominal pain, and abdominal bloating  Patient will have normal bowel movements for period of time and then subsequently developed firm ribbon like stools and a sense of incomplete evacuation  She has never really noticed any blood per rectum  She has been diagnosed with irritable bowel syndrome past   She is moving her bowels regularly she does not have significant difficulties  She notices that things seem to get worse as menstrual cycle approaches  She may have problems about 2 weeks out of the month or so  Patient has not had any weight loss  No nocturnal symptoms  Family history negative for IBD or colorectal cancer    She has been diagnosed with irritable bowel syndrome in the past   In the past patient has taken laxatives including Amitiza and Trulance will make her symptoms worse  She has fears about having a colonoscopy because she has concerned about affects of the prep that will have on her  If she has lower abdominal pain and bloating primarily located in lower abdomen associated with cramping  More gentle approaches such as stool softeners and supplemental fiber seem to be better for her  Patient does have a history of cholelithiasis noted on previous imaging studies  This was ascertained well back in 2015  Patient did have ED visit back in March of this year and was referred for evaluation for possible cholecystectomy  Procedure was actually scheduled but then subsequently canceled  Patient present denies any postprandial upper abdominal pain  With respect to the bloating and lower abdominal discomfort patient has been seen gyn  She did have a recent pelvic ultrasound she reports is negative  She has ongoing gyn follow-up      Historical Information   Past Medical History:   Diagnosis Date    Cholelithiasis     COVID 10/10/2021    IBS (irritable bowel syndrome)     Iron deficiency anemia     Menorrhagia     Palpitations      Past Surgical History:   Procedure Laterality Date    WISDOM TOOTH EXTRACTION       Social History     Substance and Sexual Activity   Alcohol Use Yes    Comment: socially     Social History     Substance and Sexual Activity   Drug Use Yes    Types: Marijuana    Comment: medical     Social History     Tobacco Use   Smoking Status Former Smoker   Smokeless Tobacco Current User     Family History   Problem Relation Age of Onset    Heart failure Mother     Hypothyroidism Sister        Meds/Allergies     Current Outpatient Medications:     dicyclomine (BENTYL) 10 mg capsule    Multiple Vitamins-Minerals (MULTIVITAMIN ADULT) TABS    Allergies   Allergen Reactions    Lubiprostone Nausea Only and GI Intolerance PHYSICAL EXAM:    Blood pressure 90/60, height 5' 4" (1 626 m), weight 70 8 kg (156 lb), not currently breastfeeding  Body mass index is 26 78 kg/m²  General Appearance: NAD, cooperative, alert  Eyes: Anicteric, conjunctiva pink  ENT:  Normocephalic, atraumatic, normal mucosa  Neck:  Supple, symmetrical, trachea midline,   Resp:  Clear to auscultation bilaterally; no rales, rhonchi or wheezing; respirations unlabored   CV:  S1 S2, Regular rate and rhythm; no murmur, rub, or gallop  GI:  Soft, non-tender, non-distended; normal bowel sounds; no masses, no organomegaly   Rectal: Deferred  Musculoskeletal: No cyanosis, clubbing or edema  Normal ROM  Skin:  No jaundice, rashes, or lesions   Heme/Lymph: No palpable cervical lymphadenopathy  Psych: Normal affect, good eye contact  Neuro: No gross deficits, AAOx3    Lab Results:   Lab Results   Component Value Date    WBC 6 06 03/11/2022    HGB 12 9 03/11/2022    HCT 39 8 03/11/2022    MCV 94 03/11/2022     03/11/2022     Lab Results   Component Value Date     09/12/2017    K 4 0 03/11/2022     03/11/2022    CO2 27 03/11/2022    BUN 11 03/11/2022    CREATININE 0 64 03/11/2022    GLUCOSE 88 09/12/2017    CALCIUM 9 2 03/11/2022    AST 14 03/11/2022    ALT 17 03/11/2022    ALKPHOS 48 03/11/2022    PROT 6 4 09/12/2017    BILITOT 0 6 09/12/2017    EGFR 114 03/11/2022     No results found for: IRON, TIBC, FERRITIN  Lab Results   Component Value Date    LIPASE 113 03/11/2022         REVIEW OF SYSTEMS:    CONSTITUTIONAL: Denies any fever, chills, rigors, and weight loss  HEENT: No earache or tinnitus  Denies hearing loss or visual disturbances  CARDIOVASCULAR: No chest pain or palpitations  RESPIRATORY: Denies any cough, hemoptysis, shortness of breath or dyspnea on exertion  GASTROINTESTINAL: As noted in the History of Present Illness  GENITOURINARY: No problems with urination  Denies any hematuria or dysuria    NEUROLOGIC: No dizziness or vertigo, denies headaches  MUSCULOSKELETAL: Denies any muscle or joint pain  SKIN: Denies skin rashes or itching  ENDOCRINE: Denies excessive thirst  Denies intolerance to heat or cold  PSYCHOSOCIAL:  Positive for anxiety   Denies any recent memory loss

## 2022-05-18 NOTE — PATIENT INSTRUCTIONS
[address] [facility] - Outpatient Consultation  Stuart Rodríguez 40 y o  female MRN: [id number]  Encounter: [id number]    ASSESSMENT AND PLAN:      1  Irritable bowel syndrome with constipation  --Patient with IBS predominant with constipation  At times she does very well with normal bowel movements  Subsequently  She will have a couple of weeks out of a month where she has abdominal plain, bloating, and erratic and incomplete bowel movements  She has a sense that she has not fully evacuated  Has been diagnosed with irritable bowel syndrome in the past   Agents such as Trulance have been too harsh for the patient  She has had several ER visits  Suspect the patient does have IBS CR IBS alternating C and normal bowel movements  Check routine screening  I do not believe she needs imaging at this time    - CBC and differential; Future  - Comprehensive metabolic panel; Future  - Celiac Disease Panel; Future  - C-reactive protein; Future  - Occult Blood, Fecal Immunochemical; Future  - dicyclomine (BENTYL) 10 mg capsule; Take 1 capsule (10 mg total) by mouth as needed in the morning and 1 capsule (10 mg total) as needed at noon and 1 capsule (10 mg total) as needed in the evening (abdominal pain)  Dispense: 90 capsule; Refill: 2     - the patient is advised to take Metamucil or Benefiber 1 tbsp with 8 oz water or juice daily at the time when she approaches her menses as this seems to be related to some of her symptoms   -  Alternatively she can have MiraLax 17 g or 8 5 g daily as needed    - Bentyl above can be taken as needed  - could consider amitriptyline 10 mg at bedtime  Hold off for now this is a serotonin blocker  Generally better for IBS D      2  Calculus of gallbladder without cholecystitis without obstruction  - patient has known gallstones  Does not seem to have postprandial abdominal pain or upper abdominal pain  Gallstones seem to be incidental    3   Anxiety  -- patient reports likely contributing to IBS symptoms    4  Bloating symptom  -- bloating abdominal discomfort part and parcel of the IBS  Did inquire about gyn issues  Patient is seeing Gynecology  She is due for pelvic ultrasound      5  Colon cancer screening-patient at average risk - has chronic symptoms --  No indication for colonoscopy at this time -  Check FIT as a basic screen for symptoms    Followup Appointment: 3-4 mo

## 2022-05-25 LAB
ALBUMIN SERPL-MCNC: 4.4 G/DL (ref 3.8–4.8)
ALBUMIN/GLOB SERPL: 2.2 {RATIO} (ref 1.2–2.2)
ALP SERPL-CCNC: 51 IU/L (ref 44–121)
ALT SERPL-CCNC: 11 IU/L (ref 0–32)
AST SERPL-CCNC: 15 IU/L (ref 0–40)
BASOPHILS # BLD AUTO: 0.1 X10E3/UL (ref 0–0.2)
BASOPHILS NFR BLD AUTO: 2 %
BILIRUB SERPL-MCNC: 0.4 MG/DL (ref 0–1.2)
BUN SERPL-MCNC: 7 MG/DL (ref 6–20)
BUN/CREAT SERPL: 10 (ref 9–23)
CALCIUM SERPL-MCNC: 9.3 MG/DL (ref 8.7–10.2)
CHLORIDE SERPL-SCNC: 105 MMOL/L (ref 96–106)
CO2 SERPL-SCNC: 21 MMOL/L (ref 20–29)
CREAT SERPL-MCNC: 0.72 MG/DL (ref 0.57–1)
CRP SERPL-MCNC: <1 MG/L (ref 0–10)
EGFR: 110 ML/MIN/1.73
ENDOMYSIUM IGA SER QL: NEGATIVE
EOSINOPHIL # BLD AUTO: 0.1 X10E3/UL (ref 0–0.4)
EOSINOPHIL NFR BLD AUTO: 4 %
ERYTHROCYTE [DISTWIDTH] IN BLOOD BY AUTOMATED COUNT: 12.2 % (ref 11.7–15.4)
GLOBULIN SER-MCNC: 2 G/DL (ref 1.5–4.5)
GLUCOSE SERPL-MCNC: 89 MG/DL (ref 65–99)
HCT VFR BLD AUTO: 35.9 % (ref 34–46.6)
HGB BLD-MCNC: 12.4 G/DL (ref 11.1–15.9)
IGA SERPL-MCNC: 314 MG/DL (ref 87–352)
IMM GRANULOCYTES # BLD: 0 X10E3/UL (ref 0–0.1)
IMM GRANULOCYTES NFR BLD: 0 %
LYMPHOCYTES # BLD AUTO: 1.3 X10E3/UL (ref 0.7–3.1)
LYMPHOCYTES NFR BLD AUTO: 34 %
MCH RBC QN AUTO: 32.2 PG (ref 26.6–33)
MCHC RBC AUTO-ENTMCNC: 34.5 G/DL (ref 31.5–35.7)
MCV RBC AUTO: 93 FL (ref 79–97)
MONOCYTES # BLD AUTO: 0.3 X10E3/UL (ref 0.1–0.9)
MONOCYTES NFR BLD AUTO: 8 %
NEUTROPHILS # BLD AUTO: 2.1 X10E3/UL (ref 1.4–7)
NEUTROPHILS NFR BLD AUTO: 52 %
PLATELET # BLD AUTO: 255 X10E3/UL (ref 150–450)
POTASSIUM SERPL-SCNC: 4.1 MMOL/L (ref 3.5–5.2)
PROT SERPL-MCNC: 6.4 G/DL (ref 6–8.5)
RBC # BLD AUTO: 3.85 X10E6/UL (ref 3.77–5.28)
SODIUM SERPL-SCNC: 140 MMOL/L (ref 134–144)
TTG IGA SER-ACNC: <2 U/ML (ref 0–3)
WBC # BLD AUTO: 3.9 X10E3/UL (ref 3.4–10.8)

## 2022-05-26 NOTE — RESULT ENCOUNTER NOTE
Labs are all normal -  celiac panel normal--   crp - inflammatory marker - normal - viewed on my chart -left vm for the patient as well

## 2022-06-08 ENCOUNTER — OFFICE VISIT (OUTPATIENT)
Dept: FAMILY MEDICINE CLINIC | Facility: CLINIC | Age: 37
End: 2022-06-08
Payer: COMMERCIAL

## 2022-06-08 VITALS
WEIGHT: 152.2 LBS | RESPIRATION RATE: 18 BRPM | HEIGHT: 64 IN | SYSTOLIC BLOOD PRESSURE: 104 MMHG | TEMPERATURE: 99.1 F | BODY MASS INDEX: 25.99 KG/M2 | OXYGEN SATURATION: 100 % | DIASTOLIC BLOOD PRESSURE: 68 MMHG | HEART RATE: 63 BPM

## 2022-06-08 DIAGNOSIS — Z13.0 SCREENING FOR ENDOCRINE, METABOLIC AND IMMUNITY DISORDER: ICD-10-CM

## 2022-06-08 DIAGNOSIS — Z13.228 SCREENING FOR ENDOCRINE, METABOLIC AND IMMUNITY DISORDER: ICD-10-CM

## 2022-06-08 DIAGNOSIS — Z13.29 SCREENING FOR ENDOCRINE, METABOLIC AND IMMUNITY DISORDER: ICD-10-CM

## 2022-06-08 DIAGNOSIS — Z13.1 SCREENING FOR DIABETES MELLITUS: ICD-10-CM

## 2022-06-08 DIAGNOSIS — Z13.6 SCREENING FOR CARDIOVASCULAR CONDITION: ICD-10-CM

## 2022-06-08 DIAGNOSIS — Z00.00 ANNUAL PHYSICAL EXAM: Primary | ICD-10-CM

## 2022-06-08 PROCEDURE — 3725F SCREEN DEPRESSION PERFORMED: CPT | Performed by: FAMILY MEDICINE

## 2022-06-08 PROCEDURE — 3008F BODY MASS INDEX DOCD: CPT | Performed by: FAMILY MEDICINE

## 2022-06-08 PROCEDURE — 1036F TOBACCO NON-USER: CPT | Performed by: FAMILY MEDICINE

## 2022-06-08 PROCEDURE — 99395 PREV VISIT EST AGE 18-39: CPT | Performed by: FAMILY MEDICINE

## 2022-06-08 NOTE — PROGRESS NOTES
[address] [facility]    NAME: Isra Alex  AGE: 40 y o  SEX: female  : 1985     DATE: 2022     Assessment and Plan:     Problem List Items Addressed This Visit    None     Visit Diagnoses     Annual physical exam    -  Primary    Screening for cardiovascular condition        Relevant Orders    Lipid panel    LDL cholesterol, direct    TSH, 3rd generation with Free T4 reflex    Screening for diabetes mellitus        Relevant Orders    Lipid panel    LDL cholesterol, direct    TSH, 3rd generation with Free T4 reflex    Screening for endocrine, metabolic and immunity disorder        Relevant Orders    Lipid panel    LDL cholesterol, direct    TSH, 3rd generation with Free T4 reflex      The patient had a normal exam in the office today  She will go for the additional lab work as indicated  She will continue to follow-up with her gynecologist as scheduled for her upcoming procedures  I did recommend the Tdap vaccine to her and she is going to consider it at this time  She is still refusing the COVID-19 vaccination  Will see her back in the office as scheduled  I reassured her that the spots on her scalp are normal and nothing to be concerned about  Immunizations and preventive care screenings were discussed with patient today  Appropriate education was printed on patient's after visit summary  Counseling:  Dental Health: discussed importance of regular tooth brushing, flossing, and dental visits  Injury prevention: discussed safety/seat belts, safety helmets, smoke detectors, carbon dioxide detectors, and smoking near bedding or upholstery  Exercise: the importance of regular exercise/physical activity was discussed  Recommend exercise 3-5 times per week for at least 30 minutes  BMI Counseling: Body mass index is 25 96 kg/m²   The BMI is above normal  Nutrition recommendations include decreasing portion sizes, encouraging healthy choices of fruits and vegetables, decreasing fast food intake, consuming healthier snacks, limiting drinks that contain sugar, moderation in carbohydrate intake, increasing intake of lean protein, reducing intake of saturated and trans fat and reducing intake of cholesterol  Exercise recommendations include exercising 3-5 times per week and strength training exercises  No pharmacotherapy was ordered  Patient referred to PCP  Rationale for BMI follow-up plan is due to patient being overweight or obese  Depression Screening and Follow-up Plan: Patient was screened for depression during today's encounter  They screened negative with a PHQ-2 score of 0  Tobacco Cessation Counseling: Tobacco cessation counseling was provided  The patient is sincerely urged to quit consumption of tobacco  She is not ready to quit tobacco        Return in about 1 year (around 6/8/2023) for Annual physical      Chief Complaint:     Chief Complaint   Patient presents with    Physical Exam     Patient reports that she has a lot going on with the GYN, has upcoming procedures with them    Scalp check     Patient reports "sunken in" areas on the top of her head that she first noticed a few months ago - notes no pain, but finds them peculiar  History of Present Illness:     Adult Annual Physical   Patient here for a comprehensive physical exam  The patient reports no problems  Alda Starks is a 40 y o  female who presents today for a complete physical  she   has been feeling well and has no complaints today  The patient denies any chest pain, shortness of breath, or palpitations  There is no edema  There are no headaches or visual changes  There is no lightheadedness, dizziness, or fainting spells  There are no GI symptoms  The patient goes for dental exams every 6 months and sees her eye doctor    The patient is watching her diet and follows a regular exercise program    She is seeing GYN for her endometriosis- she has a thickened endometrium- she is scheduled for hysteroscopy  She is seeing GI for IBS-C  Diet and Physical Activity  Diet/Nutrition: well balanced diet, limited junk food, low carb diet and consuming 3-5 servings of fruits/vegetables daily  Exercise: walking, moderate cardiovascular exercise, 3-4 times a week on average and hiking         Depression Screening  PHQ-2/9 Depression Screening    Little interest or pleasure in doing things: 0 - not at all  Feeling down, depressed, or hopeless: 0 - not at all  PHQ-2 Score: 0  PHQ-2 Interpretation: Negative depression screen       General Health  Sleep: sleeps well  Hearing: normal - bilateral   Vision: no vision problems, goes for regular eye exams and most recent eye exam >1 year ago  Dental: regular dental visits and brushes teeth twice daily  /GYN Health  She sees GYN regularly- Dr Evens Foreman  Review of Systems:     Review of Systems   Constitutional: Negative  HENT: Negative  Eyes: Negative  Respiratory: Negative  Cardiovascular: Negative  Gastrointestinal: Negative  Endocrine: Negative  Genitourinary: Negative  Musculoskeletal: Negative  Skin: Negative  Allergic/Immunologic: Negative  Neurological: Negative  Hematological: Negative  Psychiatric/Behavioral: Negative         Past Medical History:     Past Medical History:   Diagnosis Date    Cholelithiasis     COVID 10/10/2021    IBS (irritable bowel syndrome)     Iron deficiency anemia     Menorrhagia     Palpitations       Past Surgical History:     Past Surgical History:   Procedure Laterality Date    TUBAL LIGATION      WISDOM TOOTH EXTRACTION        Social History:     Social History     Socioeconomic History    Marital status: /Civil Union     Spouse name: None    Number of children: 4    Years of education: None    Highest education level: None   Occupational History    Occupation: homemaker   Tobacco Use    Smoking status: Former Smoker    Smokeless tobacco: Current User   Vaping Use    Vaping Use: Never used   Substance and Sexual Activity    Alcohol use: Yes     Comment: socially    Drug use: Not Currently     Types: Marijuana     Comment: Has medical marijuana card    Sexual activity: Yes     Partners: Male     Birth control/protection: Condom Male   Other Topics Concern    None   Social History Narrative    None     Social Determinants of Health     Financial Resource Strain: Not on file   Food Insecurity: Not on file   Transportation Needs: Not on file   Physical Activity: Sufficiently Active    Days of Exercise per Week: 3 days    Minutes of Exercise per Session: 60 min   Stress: Stress Concern Present    Feeling of Stress : Rather much   Social Connections: Not on file   Intimate Partner Violence: Not At Risk    Fear of Current or Ex-Partner: No    Emotionally Abused: No    Physically Abused: No    Sexually Abused: No   Housing Stability: Not on file      Family History:     Family History   Problem Relation Age of Onset    Heart failure Mother     Hypothyroidism Sister       Current Medications:     Current Outpatient Medications   Medication Sig Dispense Refill    dicyclomine (BENTYL) 10 mg capsule Take 1 capsule (10 mg total) by mouth as needed in the morning and 1 capsule (10 mg total) as needed at noon and 1 capsule (10 mg total) as needed in the evening (abdominal pain)  90 capsule 2    Multiple Vitamins-Minerals (MULTIVITAMIN ADULT) TABS Take 1 tablet by mouth      polyethylene glycol (MIRALAX) 17 g packet Take 17 gm (1 scoop) daily 510 g 3     No current facility-administered medications for this visit  Allergies:      Allergies   Allergen Reactions    Lubiprostone Nausea Only and GI Intolerance      Physical Exam:     /68 (BP Location: Left arm, Patient Position: Sitting, Cuff Size: Standard)   Pulse 63   Temp 99 1 °F (37 3 °C) (Tympanic)   Resp 18   Ht 5' 4 2" (1 631 m)   Wt 69 kg (152 lb 3 2 oz)   LMP 05/16/2022 (Exact Date)   SpO2 100%   Breastfeeding No   BMI 25 96 kg/m²     Physical Exam  Constitutional:       General: She is not in acute distress  Appearance: She is well-developed  She is not diaphoretic  HENT:      Head: Normocephalic and atraumatic  Right Ear: External ear normal       Left Ear: External ear normal       Nose: Nose normal       Mouth/Throat:      Pharynx: No oropharyngeal exudate  Eyes:      General: No scleral icterus  Right eye: No discharge  Left eye: No discharge  Pupils: Pupils are equal, round, and reactive to light  Neck:      Thyroid: No thyromegaly  Vascular: No JVD  Trachea: No tracheal deviation  Cardiovascular:      Rate and Rhythm: Normal rate and regular rhythm  Heart sounds: Normal heart sounds  No murmur heard  No friction rub  No gallop  Pulmonary:      Effort: Pulmonary effort is normal  No respiratory distress  Breath sounds: Normal breath sounds  No wheezing or rales  Chest:      Chest wall: No tenderness  Abdominal:      General: Bowel sounds are normal  There is no distension  Palpations: Abdomen is soft  There is no mass  Tenderness: There is no abdominal tenderness  There is no guarding or rebound  Hernia: No hernia is present  Musculoskeletal:         General: No tenderness or deformity  Normal range of motion  Cervical back: Normal range of motion and neck supple  Lymphadenopathy:      Cervical: No cervical adenopathy  Skin:     General: Skin is warm and dry  Coloration: Skin is not pale  Findings: No erythema or rash  Neurological:      Mental Status: She is alert and oriented to person, place, and time  Cranial Nerves: No cranial nerve deficit  Sensory: No sensory deficit  Motor: No abnormal muscle tone        Coordination: Coordination normal       Deep Tendon Reflexes: Reflexes normal  Psychiatric:         Behavior: Behavior normal          Thought Content:  Thought content normal           Elena Lanier DO   [address] [facility]

## 2022-06-08 NOTE — PATIENT INSTRUCTIONS

## 2022-07-22 LAB
CHOLEST SERPL-MCNC: 175 MG/DL (ref 100–199)
CHOLEST/HDLC SERPL: 2.2 RATIO (ref 0–4.4)
HDLC SERPL-MCNC: 79 MG/DL
HEMOCCULT STL QL IA: NEGATIVE
LDLC SERPL CALC-MCNC: 87 MG/DL (ref 0–99)
LDLC SERPL DIRECT ASSAY-MCNC: 90 MG/DL (ref 0–99)
SL AMB VLDL CHOLESTEROL CALC: 9 MG/DL (ref 5–40)
TRIGL SERPL-MCNC: 45 MG/DL (ref 0–149)
TSH SERPL DL<=0.005 MIU/L-ACNC: 1.78 UIU/ML (ref 0.45–4.5)

## 2022-08-04 ENCOUNTER — OFFICE VISIT (OUTPATIENT)
Dept: FAMILY MEDICINE CLINIC | Facility: CLINIC | Age: 37
End: 2022-08-04
Payer: COMMERCIAL

## 2022-08-04 VITALS
DIASTOLIC BLOOD PRESSURE: 64 MMHG | TEMPERATURE: 99.4 F | BODY MASS INDEX: 26.53 KG/M2 | OXYGEN SATURATION: 98 % | SYSTOLIC BLOOD PRESSURE: 112 MMHG | WEIGHT: 155.4 LBS | RESPIRATION RATE: 16 BRPM | HEART RATE: 76 BPM | HEIGHT: 64 IN

## 2022-08-04 DIAGNOSIS — R63.5 ABNORMAL WEIGHT GAIN: Primary | ICD-10-CM

## 2022-08-04 DIAGNOSIS — E66.3 OVERWEIGHT (BMI 25.0-29.9): ICD-10-CM

## 2022-08-04 PROCEDURE — 3725F SCREEN DEPRESSION PERFORMED: CPT | Performed by: FAMILY MEDICINE

## 2022-08-04 PROCEDURE — 99214 OFFICE O/P EST MOD 30 MIN: CPT | Performed by: FAMILY MEDICINE

## 2022-08-04 RX ORDER — PHENTERMINE HYDROCHLORIDE 37.5 MG/1
37.5 CAPSULE ORAL EVERY MORNING
Qty: 30 CAPSULE | Refills: 0 | Status: SHIPPED | OUTPATIENT
Start: 2022-08-04 | End: 2022-09-02 | Stop reason: SDUPTHER

## 2022-08-04 NOTE — PROGRESS NOTES
Assessment/Plan:  Problem List Items Addressed This Visit        Other    Abnormal weight gain - Primary     The patient continues to gain weight despite following different weight loss plans and decreasing her caloric intake and increasing exercise  She does have some occasional trouble controlling her appetite  We will do a short course of phentermine to help jump start her weight loss plan  She is aware that we will only prescribe this for very short-term use  We will monitor her closely and will see her back in the office again in a month  Relevant Medications    phentermine 37 5 MG capsule    Overweight (BMI 25 0-29  9)    Relevant Medications    phentermine 37 5 MG capsule          Return in about 1 month (around 9/4/2022) for Recheck  I spent 20 minutes during the visit reviewing the history from the patient, performing the examination, discussing the findings with the patient, providing counseling and education, and making a plan  I spent 10 minutes ordering referrals and testing and documenting  Subjective:   Chief Complaint   Patient presents with    Weight Gain     Struggling with weight gain        Patient ID: Robyn Gautam is a 40 y o  female presents today for evaluation of concerns about weight gain  Robyn Gautam is a 40 y o  female who presents today about concerns about weight gain and being overweight  She has tried multiple weight loss plans and low carb diets as well as portion control along with increasing her exercise and has not been successful  She is concerned about her weight  She had recent blood work which was all normal     She is trying to lose weight and has been unsuccessful  She wants to lose weight  She has seen a nutritionist before  She was on the Phentermine in the past     She is watching her portion sizes  She exercises almost every day  She is going to walk more  She was on the Phentermine in 2008 and she was on it for a few months  The patient denies any chest pain, shortness of breath, or palpitations  There is no edema  There are no headaches or visual changes  There is no lightheadedness, dizziness, or fainting spells  There is no nausea or vomiting  The following portions of the patient's history were reviewed and updated as appropriate: allergies, current medications, past family history, past medical history, past social history, past surgical history and problem list   Patient Active Problem List   Diagnosis    Abnormal weight gain    Cholelithiasis without obstruction    Chronic constipation    Hypothyroidism due to acquired atrophy of thyroid    Iron deficiency anemia due to chronic blood loss    Pulmonary nodule seen on imaging study    Vitamin D deficiency    PTSD (post-traumatic stress disorder)    Medical marijuana use    Eustachian tube dysfunction, bilateral    Elevated cortisol level    Irritable bowel syndrome with constipation    Overweight (BMI 25 0-29  9)     Past Medical History:   Diagnosis Date    Cholelithiasis     COVID 10/10/2021    IBS (irritable bowel syndrome)     Iron deficiency anemia     Menorrhagia     Palpitations      Past Surgical History:   Procedure Laterality Date    TUBAL LIGATION      WISDOM TOOTH EXTRACTION       Allergies   Allergen Reactions    Lubiprostone Nausea Only and GI Intolerance     Family History   Problem Relation Age of Onset    Heart failure Mother     Hypothyroidism Sister      Social History     Socioeconomic History    Marital status: /Civil Union     Spouse name: Not on file    Number of children: 3    Years of education: Not on file    Highest education level: Not on file   Occupational History    Occupation: homemaker   Tobacco Use    Smoking status: Former Smoker    Smokeless tobacco: Current User   Vaping Use    Vaping Use: Never used   Substance and Sexual Activity    Alcohol use: Yes     Comment: socially    Drug use: Not Currently     Types: Marijuana     Comment: Has medical marijuana card    Sexual activity: Yes     Partners: Male     Birth control/protection: Condom Male   Other Topics Concern    Not on file   Social History Narrative    Not on file     Social Determinants of Health     Financial Resource Strain: Not on file   Food Insecurity: Not on file   Transportation Needs: Not on file   Physical Activity: Sufficiently Active    Days of Exercise per Week: 3 days    Minutes of Exercise per Session: 60 min   Stress: Stress Concern Present    Feeling of Stress : Rather much   Social Connections: Not on file   Intimate Partner Violence: Not At Risk    Fear of Current or Ex-Partner: No    Emotionally Abused: No    Physically Abused: No    Sexually Abused: No   Housing Stability: Not on file     Current Outpatient Medications on File Prior to Visit   Medication Sig Dispense Refill    Multiple Vitamins-Minerals (MULTIVITAMIN ADULT) TABS Take 1 tablet by mouth      polyethylene glycol (MIRALAX) 17 g packet Take 17 gm (1 scoop) daily 510 g 3    dicyclomine (BENTYL) 10 mg capsule Take 1 capsule (10 mg total) by mouth as needed in the morning and 1 capsule (10 mg total) as needed at noon and 1 capsule (10 mg total) as needed in the evening (abdominal pain)  (Patient not taking: Reported on 8/4/2022) 90 capsule 2     No current facility-administered medications on file prior to visit  Review of Systems   Constitutional: Negative  HENT: Negative  Eyes: Negative  Respiratory: Negative  Cardiovascular: Negative  Gastrointestinal: Negative  Endocrine: Negative  Genitourinary: Negative  Musculoskeletal: Negative  Skin: Negative  Allergic/Immunologic: Negative  Neurological: Negative  Hematological: Negative  Psychiatric/Behavioral: Negative          Objective:  Vitals:    08/04/22 1404   BP: 112/64   BP Location: Left arm   Patient Position: Sitting   Cuff Size: Large   Pulse: 76   Resp: 16   Temp: 99 4 °F (37 4 °C)   TempSrc: Tympanic   SpO2: 98%   Weight: 70 5 kg (155 lb 6 4 oz)   Height: 5' 4 2" (1 631 m)     Body mass index is 26 51 kg/m²  Physical Exam  Constitutional:       Appearance: She is well-developed  HENT:      Head: Normocephalic and atraumatic  Mouth/Throat:      Pharynx: No oropharyngeal exudate  Eyes:      Conjunctiva/sclera: Conjunctivae normal       Pupils: Pupils are equal, round, and reactive to light  Neck:      Thyroid: No thyromegaly  Vascular: No JVD  Trachea: No tracheal deviation  Cardiovascular:      Rate and Rhythm: Normal rate and regular rhythm  Heart sounds: Normal heart sounds  No murmur heard  No friction rub  No gallop  Pulmonary:      Effort: Pulmonary effort is normal  No respiratory distress  Breath sounds: Normal breath sounds  No stridor  No wheezing or rales  Chest:      Chest wall: No tenderness  Abdominal:      General: Bowel sounds are normal  There is no distension  Palpations: Abdomen is soft  There is no mass  Tenderness: There is no abdominal tenderness  There is no guarding or rebound  Musculoskeletal:         General: No tenderness or deformity  Normal range of motion  Cervical back: Normal range of motion  Lymphadenopathy:      Cervical: No cervical adenopathy  Skin:     General: Skin is warm and dry  Neurological:      Mental Status: She is alert and oriented to person, place, and time  Cranial Nerves: No cranial nerve deficit  Motor: No abnormal muscle tone  Coordination: Coordination normal       Deep Tendon Reflexes: Reflexes are normal and symmetric  Reflexes normal              Depression Screening and Follow-up Plan: Patient was screened for depression during today's encounter  They screened negative with a PHQ-2 score of 0

## 2022-08-06 PROBLEM — E66.3 OVERWEIGHT (BMI 25.0-29.9): Status: ACTIVE | Noted: 2022-08-06

## 2022-08-06 NOTE — ASSESSMENT & PLAN NOTE
The patient continues to gain weight despite following different weight loss plans and decreasing her caloric intake and increasing exercise  She does have some occasional trouble controlling her appetite  We will do a short course of phentermine to help jump start her weight loss plan  She is aware that we will only prescribe this for very short-term use  We will monitor her closely and will see her back in the office again in a month

## 2022-08-26 DIAGNOSIS — K58.1 IRRITABLE BOWEL SYNDROME WITH CONSTIPATION: ICD-10-CM

## 2022-08-26 RX ORDER — DICYCLOMINE HYDROCHLORIDE 10 MG/1
10 CAPSULE ORAL 3 TIMES DAILY PRN
Qty: 90 CAPSULE | Refills: 2 | Status: SHIPPED | OUTPATIENT
Start: 2022-08-26 | End: 2022-10-06

## 2022-09-02 ENCOUNTER — OFFICE VISIT (OUTPATIENT)
Dept: FAMILY MEDICINE CLINIC | Facility: CLINIC | Age: 37
End: 2022-09-02
Payer: COMMERCIAL

## 2022-09-02 VITALS
BODY MASS INDEX: 25.37 KG/M2 | OXYGEN SATURATION: 100 % | TEMPERATURE: 99.2 F | DIASTOLIC BLOOD PRESSURE: 70 MMHG | RESPIRATION RATE: 17 BRPM | SYSTOLIC BLOOD PRESSURE: 104 MMHG | HEIGHT: 64 IN | HEART RATE: 71 BPM | WEIGHT: 148.6 LBS

## 2022-09-02 DIAGNOSIS — E66.3 OVERWEIGHT (BMI 25.0-29.9): Primary | ICD-10-CM

## 2022-09-02 DIAGNOSIS — R63.5 ABNORMAL WEIGHT GAIN: ICD-10-CM

## 2022-09-02 PROCEDURE — 99213 OFFICE O/P EST LOW 20 MIN: CPT | Performed by: FAMILY MEDICINE

## 2022-09-02 RX ORDER — POLYETHYLENE GLYCOL 3350 17 G/17G
POWDER, FOR SOLUTION ORAL
COMMUNITY
Start: 2022-08-29 | End: 2022-10-06 | Stop reason: SDUPTHER

## 2022-09-02 RX ORDER — PHENTERMINE HYDROCHLORIDE 37.5 MG/1
37.5 CAPSULE ORAL EVERY MORNING
Qty: 30 CAPSULE | Refills: 0 | Status: SHIPPED | OUTPATIENT
Start: 2022-09-02 | End: 2022-10-06 | Stop reason: ALTCHOICE

## 2022-09-02 NOTE — PROGRESS NOTES
Assessment/Plan:  Problem List Items Addressed This Visit        Other    Abnormal weight gain    Relevant Medications    phentermine 37 5 MG capsule    Overweight (BMI 25 0-29 9) - Primary     The patient has done excellent job with making changes to her diet and increasing her exercise  She will continue with her healthy lifestyle  She has had weight loss since her last visit and we will continue with the phentermine for an additional month to help with her weight loss journey  We will see her back as scheduled in the office  Relevant Medications    phentermine 37 5 MG capsule          Return in about 1 month (around 10/2/2022) for Recheck  I spent 15 minutes during the visit reviewing the history from the patient, performing the examination, discussing the findings with the patient, providing counseling and education, and making a plan  I spent 15 minutes ordering referrals and testing and documenting  Subjective:   Chief Complaint   Patient presents with    Follow-up     1 month follow up  Started on phentermine at last appointment  Patient ID: Sarah Lares is a 40 y o  female presents today for follow-up of her weight management  Mattie Carranza is a 40 y o  female who presents today for follow-up of her phentermine use  We started her on the medication 2 weeks ago to help jump start her weight loss  She has seen improvement has lost 8 lb since her last visit  She has been eating more protein and cutting out the carbohydrates  She is doing regular weight training and walking and moderate cardiovascular exercise  She is feeling great with exercise  She denies any chest pain, shortness of breath, or palpitations  The medication was extremely suppressing her appetite when she started it but now it has leveled out  She is having no problems with the medication  She is sleeping well at night  She states that the medication was supressing her appetite    She is eating more protein  She has cut out bread  She is drinking water  She is bili more muscle and has lost inches  She feels that she has more energy           The following portions of the patient's history were reviewed and updated as appropriate: allergies, current medications, past family history, past medical history, past social history, past surgical history and problem list   Patient Active Problem List   Diagnosis    Abnormal weight gain    Cholelithiasis without obstruction    Chronic constipation    Hypothyroidism due to acquired atrophy of thyroid    Iron deficiency anemia due to chronic blood loss    Pulmonary nodule seen on imaging study    Vitamin D deficiency    PTSD (post-traumatic stress disorder)    Medical marijuana use    Eustachian tube dysfunction, bilateral    Elevated cortisol level    Irritable bowel syndrome with constipation    Overweight (BMI 25 0-29  9)     Past Medical History:   Diagnosis Date    Cholelithiasis     COVID 10/10/2021    IBS (irritable bowel syndrome)     Iron deficiency anemia     Menorrhagia     Palpitations      Past Surgical History:   Procedure Laterality Date    TUBAL LIGATION      WISDOM TOOTH EXTRACTION       Allergies   Allergen Reactions    Lubiprostone Nausea Only and GI Intolerance     Family History   Problem Relation Age of Onset    Heart failure Mother     Hypothyroidism Sister      Social History     Socioeconomic History    Marital status: /Civil Union     Spouse name: Not on file    Number of children: 3    Years of education: Not on file    Highest education level: Not on file   Occupational History    Occupation: homemaker   Tobacco Use    Smoking status: Former Smoker    Smokeless tobacco: Current User   Vaping Use    Vaping Use: Never used   Substance and Sexual Activity    Alcohol use: Yes     Comment: socially    Drug use: Not Currently     Types: Marijuana     Comment: Has medical marijuana card    Sexual activity: Yes     Partners: Male     Birth control/protection: Condom Male   Other Topics Concern    Not on file   Social History Narrative    Not on file     Social Determinants of Health     Financial Resource Strain: Not on file   Food Insecurity: Not on file   Transportation Needs: Not on file   Physical Activity: Sufficiently Active    Days of Exercise per Week: 3 days    Minutes of Exercise per Session: 60 min   Stress: Stress Concern Present    Feeling of Stress : Rather much   Social Connections: Not on file   Intimate Partner Violence: Not At Risk    Fear of Current or Ex-Partner: No    Emotionally Abused: No    Physically Abused: No    Sexually Abused: No   Housing Stability: Not on file     Current Outpatient Medications on File Prior to Visit   Medication Sig Dispense Refill    dicyclomine (BENTYL) 10 mg capsule TAKE 1 CAPSULE (10 MG TOTAL) BY MOUTH AS NEEDED IN THE MORNING AND 1 CAPSULE (10 MG TOTAL) AS NEEDED AT NOON AND 1 CAPSULE (10 MG TOTAL) AS NEEDED IN THE EVENING (ABDOMINAL PAIN)  90 capsule 2    Multiple Vitamins-Minerals (MULTIVITAMIN ADULT) TABS Take 1 tablet by mouth      polyethylene glycol (GLYCOLAX) 17 GM/SCOOP powder       polyethylene glycol (MIRALAX) 17 g packet Take 17 gm (1 scoop) daily 510 g 3     No current facility-administered medications on file prior to visit  Review of Systems   Constitutional: Negative  HENT: Negative  Eyes: Negative  Respiratory: Negative  Cardiovascular: Negative  Gastrointestinal: Negative  Endocrine: Negative  Genitourinary: Negative  Musculoskeletal: Negative  Skin: Negative  Allergic/Immunologic: Negative  Neurological: Negative  Hematological: Negative  Psychiatric/Behavioral: Negative          Objective:  Vitals:    09/02/22 1634   BP: 104/70   BP Location: Left arm   Patient Position: Sitting   Cuff Size: Large   Pulse: 71   Resp: 17   Temp: 99 2 °F (37 3 °C)   TempSrc: Tympanic   SpO2: 100%   Weight: 67 4 kg (148 lb 9 6 oz)   Height: 5' 4 2" (1 631 m)     Body mass index is 25 35 kg/m²  Physical Exam  Constitutional:       Appearance: She is well-developed  HENT:      Head: Normocephalic and atraumatic  Mouth/Throat:      Pharynx: No oropharyngeal exudate  Eyes:      Conjunctiva/sclera: Conjunctivae normal       Pupils: Pupils are equal, round, and reactive to light  Neck:      Thyroid: No thyromegaly  Vascular: No JVD  Trachea: No tracheal deviation  Cardiovascular:      Rate and Rhythm: Normal rate and regular rhythm  Heart sounds: Normal heart sounds  No murmur heard  No friction rub  No gallop  Pulmonary:      Effort: Pulmonary effort is normal  No respiratory distress  Breath sounds: Normal breath sounds  No stridor  No wheezing or rales  Chest:      Chest wall: No tenderness  Abdominal:      General: Bowel sounds are normal  There is no distension  Palpations: Abdomen is soft  There is no mass  Tenderness: There is no abdominal tenderness  There is no guarding or rebound  Musculoskeletal:         General: No tenderness or deformity  Normal range of motion  Cervical back: Normal range of motion  Lymphadenopathy:      Cervical: No cervical adenopathy  Skin:     General: Skin is warm and dry  Neurological:      Mental Status: She is alert and oriented to person, place, and time  Cranial Nerves: No cranial nerve deficit  Motor: No abnormal muscle tone  Coordination: Coordination normal       Deep Tendon Reflexes: Reflexes are normal and symmetric   Reflexes normal

## 2022-09-03 NOTE — ASSESSMENT & PLAN NOTE
The patient has done excellent job with making changes to her diet and increasing her exercise  She will continue with her healthy lifestyle  She has had weight loss since her last visit and we will continue with the phentermine for an additional month to help with her weight loss journey  We will see her back as scheduled in the office

## 2022-09-12 ENCOUNTER — TELEPHONE (OUTPATIENT)
Dept: GASTROENTEROLOGY | Facility: CLINIC | Age: 37
End: 2022-09-12

## 2022-10-06 ENCOUNTER — OFFICE VISIT (OUTPATIENT)
Dept: FAMILY MEDICINE CLINIC | Facility: CLINIC | Age: 37
End: 2022-10-06
Payer: COMMERCIAL

## 2022-10-06 VITALS
RESPIRATION RATE: 16 BRPM | OXYGEN SATURATION: 99 % | HEIGHT: 64 IN | BODY MASS INDEX: 24.96 KG/M2 | SYSTOLIC BLOOD PRESSURE: 90 MMHG | WEIGHT: 146.2 LBS | HEART RATE: 64 BPM | TEMPERATURE: 98.5 F | DIASTOLIC BLOOD PRESSURE: 62 MMHG

## 2022-10-06 DIAGNOSIS — Z76.89 ENCOUNTER FOR WEIGHT MANAGEMENT: Primary | ICD-10-CM

## 2022-10-06 PROBLEM — E66.3 OVERWEIGHT (BMI 25.0-29.9): Status: RESOLVED | Noted: 2022-08-06 | Resolved: 2022-10-06

## 2022-10-06 PROBLEM — H69.93 EUSTACHIAN TUBE DYSFUNCTION, BILATERAL: Status: RESOLVED | Noted: 2021-10-28 | Resolved: 2022-10-06

## 2022-10-06 PROBLEM — R63.5 ABNORMAL WEIGHT GAIN: Status: RESOLVED | Noted: 2017-10-30 | Resolved: 2022-10-06

## 2022-10-06 PROBLEM — R79.89 ELEVATED CORTISOL LEVEL: Status: RESOLVED | Noted: 2021-10-28 | Resolved: 2022-10-06

## 2022-10-06 PROBLEM — H69.83 EUSTACHIAN TUBE DYSFUNCTION, BILATERAL: Status: RESOLVED | Noted: 2021-10-28 | Resolved: 2022-10-06

## 2022-10-06 PROCEDURE — 99213 OFFICE O/P EST LOW 20 MIN: CPT | Performed by: FAMILY MEDICINE

## 2022-10-06 NOTE — PROGRESS NOTES
Assessment/Plan:  Problem List Items Addressed This Visit        Other    Encounter for weight management - Primary     The patient has done extremely well with her diet and exercise program   She has lost 9 lb since she started at this point I think we can safely discontinue the phentermine and allow her to continue to work on her healthy diet and exercise and weight training  She will continue to monitor her weight at home  She will call us with any problems  We will see her back as scheduled for her physical   Her BMI is now in the normal range and I expect it to continue to improve  Return in about 8 months (around 6/9/2023) for Annual physical    I spent 10 minutes during the visit reviewing the history from the patient, performing the examination, discussing the findings with the patient, providing counseling and education, and making a plan  I spent 10 minutes ordering referrals and testing and documenting  Subjective:   Chief Complaint   Patient presents with    Follow-up     Follow up 1 month weight loss        Patient ID: Nelly Dupont is a 40 y o  female presents today for follow-up of her weight loss  Nelly Dupont is a 40 y o  female who presents today for follow-up of her weight loss management  She is taking the phentermine and has lost a  total of 9 lb since for starting the medication  Her BMI is now in the normal range  She is working on her diet and her exercise is increasing and doing well with this  She feels better and has more energy  She is doing regular exercise and weight training as well and has lost inches  The patient denies any chest pain, shortness of breath, or palpitations  There is no edema  There are no headaches or visual changes  There is no lightheadedness, dizziness, or fainting spells  The patient currently denies any nausea, vomiting, or GERD symptoms  she has normal bowel movements and normal urine output  she has a normal appetite    She has had some fluctuation  She has lost some inches  She is seeing that her clothes are looser  The following portions of the patient's history were reviewed and updated as appropriate: allergies, current medications, past family history, past medical history, past social history, past surgical history and problem list   Patient Active Problem List   Diagnosis    Cholelithiasis without obstruction    Chronic constipation    Hypothyroidism due to acquired atrophy of thyroid    Iron deficiency anemia due to chronic blood loss    Pulmonary nodule seen on imaging study    Vitamin D deficiency    PTSD (post-traumatic stress disorder)    Medical marijuana use    Irritable bowel syndrome with constipation    Encounter for weight management     Past Medical History:   Diagnosis Date    Abnormal weight gain 10/30/2017    Cholelithiasis     COVID 10/10/2021    Elevated cortisol level 10/28/2021    Eustachian tube dysfunction, bilateral 10/28/2021    IBS (irritable bowel syndrome)     Iron deficiency anemia     Menorrhagia     Overweight (BMI 25 0-29  9) 8/6/2022    Palpitations      Past Surgical History:   Procedure Laterality Date    TUBAL LIGATION      WISDOM TOOTH EXTRACTION       Allergies   Allergen Reactions    Lubiprostone Nausea Only and GI Intolerance     Family History   Problem Relation Age of Onset    Heart failure Mother     Hypothyroidism Sister      Social History     Socioeconomic History    Marital status: /Civil Union     Spouse name: Not on file    Number of children: 3    Years of education: Not on file    Highest education level: Not on file   Occupational History    Occupation: homemaker   Tobacco Use    Smoking status: Former Smoker    Smokeless tobacco: Current User   Vaping Use    Vaping Use: Never used   Substance and Sexual Activity    Alcohol use: Yes     Comment: socially    Drug use: Not Currently     Types: Marijuana     Comment: Has medical marijuana card    Sexual activity: Yes     Partners: Male     Birth control/protection: Condom Male   Other Topics Concern    Not on file   Social History Narrative    Not on file     Social Determinants of Health     Financial Resource Strain: Not on file   Food Insecurity: Not on file   Transportation Needs: Not on file   Physical Activity: Sufficiently Active    Days of Exercise per Week: 3 days    Minutes of Exercise per Session: 60 min   Stress: Stress Concern Present    Feeling of Stress : Rather much   Social Connections: Not on file   Intimate Partner Violence: Not At Risk    Fear of Current or Ex-Partner: No    Emotionally Abused: No    Physically Abused: No    Sexually Abused: No   Housing Stability: Not on file     Current Outpatient Medications on File Prior to Visit   Medication Sig Dispense Refill    dicyclomine (BENTYL) 10 mg capsule TAKE 1 CAPSULE (10 MG TOTAL) BY MOUTH AS NEEDED IN THE MORNING AND 1 CAPSULE (10 MG TOTAL) AS NEEDED AT NOON AND 1 CAPSULE (10 MG TOTAL) AS NEEDED IN THE EVENING (ABDOMINAL PAIN)  90 capsule 2    Multiple Vitamins-Minerals (MULTIVITAMIN ADULT) TABS Take 1 tablet by mouth      polyethylene glycol (MIRALAX) 17 g packet Take 17 gm (1 scoop) daily 510 g 3    [DISCONTINUED] phentermine 37 5 MG capsule Take 1 capsule (37 5 mg total) by mouth every morning 30 capsule 0    [DISCONTINUED] polyethylene glycol (GLYCOLAX) 17 GM/SCOOP powder        No current facility-administered medications on file prior to visit  Review of Systems   Constitutional: Negative  HENT: Negative  Eyes: Negative  Respiratory: Negative  Cardiovascular: Negative  Gastrointestinal: Negative  Endocrine: Negative  Genitourinary: Negative  Musculoskeletal: Negative  Skin: Negative  Allergic/Immunologic: Negative  Neurological: Negative  Hematological: Negative  Psychiatric/Behavioral: Negative          Objective:  Vitals:    10/06/22 0859   BP: 90/62   BP Location: Left arm   Patient Position: Sitting   Cuff Size: Large   Pulse: 64   Resp: 16   Temp: 98 5 °F (36 9 °C)   TempSrc: Tympanic   SpO2: 99%   Weight: 66 3 kg (146 lb 3 2 oz)   Height: 5' 4 2" (1 631 m)     Body mass index is 24 94 kg/m²  Physical Exam  Constitutional:       Appearance: She is well-developed  HENT:      Head: Normocephalic and atraumatic  Mouth/Throat:      Pharynx: No oropharyngeal exudate  Eyes:      Conjunctiva/sclera: Conjunctivae normal       Pupils: Pupils are equal, round, and reactive to light  Neck:      Thyroid: No thyromegaly  Vascular: No JVD  Trachea: No tracheal deviation  Cardiovascular:      Rate and Rhythm: Normal rate and regular rhythm  Heart sounds: Normal heart sounds  No murmur heard  No friction rub  No gallop  Pulmonary:      Effort: Pulmonary effort is normal  No respiratory distress  Breath sounds: Normal breath sounds  No stridor  No wheezing or rales  Chest:      Chest wall: No tenderness  Abdominal:      General: Bowel sounds are normal  There is no distension  Palpations: Abdomen is soft  There is no mass  Tenderness: There is no abdominal tenderness  There is no guarding or rebound  Musculoskeletal:         General: No tenderness or deformity  Normal range of motion  Cervical back: Normal range of motion  Lymphadenopathy:      Cervical: No cervical adenopathy  Skin:     General: Skin is warm and dry  Neurological:      Mental Status: She is alert and oriented to person, place, and time  Cranial Nerves: No cranial nerve deficit  Motor: No abnormal muscle tone  Coordination: Coordination normal       Deep Tendon Reflexes: Reflexes are normal and symmetric   Reflexes normal

## 2022-10-06 NOTE — ASSESSMENT & PLAN NOTE
The patient has done extremely well with her diet and exercise program   She has lost 9 lb since she started at this point I think we can safely discontinue the phentermine and allow her to continue to work on her healthy diet and exercise and weight training  She will continue to monitor her weight at home  She will call us with any problems  We will see her back as scheduled for her physical   Her BMI is now in the normal range and I expect it to continue to improve

## 2022-11-10 ENCOUNTER — PATIENT MESSAGE (OUTPATIENT)
Dept: FAMILY MEDICINE CLINIC | Facility: CLINIC | Age: 37
End: 2022-11-10

## 2022-11-10 DIAGNOSIS — Z20.822 CLOSE EXPOSURE TO COVID-19 VIRUS: Primary | ICD-10-CM

## 2022-11-14 RX ORDER — COVID-19 MOLECULAR TEST ASSAY
1 KIT MISCELLANEOUS ONCE
Qty: 1 KIT | Refills: 6 | Status: SHIPPED | OUTPATIENT
Start: 2022-11-14 | End: 2022-11-14

## 2022-11-14 NOTE — TELEPHONE ENCOUNTER
From: Terra Sessions  To: Cris Freeman  Sent: 11/10/2022 6:25 PM EST  Subject: urgent need rx for at home covid tests    If you get this message before you leave can you please call me in an rx so my medical assistance covers the at home tests for myself and zachary and sera?  my  got a positive on a test that was different and then the binax ones say negative but we definitely have what he has     valentin ayon 7/14/10  zachary ayon 7/2/12  osiel lynn [birth year]

## 2022-12-02 ENCOUNTER — TELEPHONE (OUTPATIENT)
Dept: GASTROENTEROLOGY | Facility: AMBULARY SURGERY CENTER | Age: 37
End: 2022-12-02

## 2022-12-02 NOTE — TELEPHONE ENCOUNTER
I spoke with patient, she states at her last visit you mentioned she could possibly have MRI vs colonoscopy  She states she has "lump" rectal area and is interested in having radiology test completed  I reviewed an MRI does not replace colonoscopy and an office visit would be recommended for physical exam  I also reviewed with her insurance CT scan may be covered first vs MRI  Patient is on wait list and will be contacted if any opening  She is agreeable to see any provider

## 2022-12-02 NOTE — TELEPHONE ENCOUNTER
Patients GI provider:  Dr Zuleima Castillo    Number to return call: [phone]     Reason for call: Pt calling requesting to speak with a nurse to see if MRI order can be placed as an alternative to a colonoscopy       Scheduled procedure/appointment date if applicable: Apt/procedure no available appt

## 2022-12-06 ENCOUNTER — TELEPHONE (OUTPATIENT)
Dept: GASTROENTEROLOGY | Facility: CLINIC | Age: 37
End: 2022-12-06

## 2022-12-06 ENCOUNTER — OFFICE VISIT (OUTPATIENT)
Dept: GASTROENTEROLOGY | Facility: CLINIC | Age: 37
End: 2022-12-06

## 2022-12-06 VITALS
BODY MASS INDEX: 24.72 KG/M2 | WEIGHT: 144.8 LBS | DIASTOLIC BLOOD PRESSURE: 70 MMHG | HEIGHT: 64 IN | SYSTOLIC BLOOD PRESSURE: 108 MMHG

## 2022-12-06 DIAGNOSIS — R19.4 ALTERED BOWEL HABITS: ICD-10-CM

## 2022-12-06 DIAGNOSIS — K62.89 RECTAL LUMP: ICD-10-CM

## 2022-12-06 DIAGNOSIS — K62.89 RECTAL MASS: Primary | ICD-10-CM

## 2022-12-06 RX ORDER — SODIUM PHOSPHATE, DIBASIC AND SODIUM PHOSPHATE, MONOBASIC 7; 19 G/133ML; G/133ML
1 ENEMA RECTAL SEE ADMIN INSTRUCTIONS
Qty: 1 ENEMA | Refills: 0 | Status: SHIPPED | OUTPATIENT
Start: 2022-12-06

## 2022-12-06 NOTE — H&P (VIEW-ONLY)
[id number] - Outpatient Follow-up Note  Lin Chin 40 y o  female MRN: [id number]  Encounter: [id number]    ASSESSMENT AND PLAN:      1  Rectal lump  2  Rectal mass  3  Altered bowel habits    Patient presenting with concern of a rectal mass on physical exam   Also with change of bowel forms to more flat stools  No GI bleeding noted  Also with possible concern of rectocele  Will evaluate with MRI of the pelvis to further characterize the anal canal to rule out any malignancy  We will also evaluate with flexible sigmoidoscopy to rule out malignancy with the change of the bowel form  Originally plan for colonoscopy but patient states that she is unable to take oral bowel preparation  If work-up is negative can consider defecography to rule out rectocele  Also possible anorectal manometry if she continues to have issues with bowel movements  - Flexible Sigmoidoscopy; Future  - sodium phosphate-biphosphate (FLEET) 7-19 g 118 mL enema; Insert 1 enema into the rectum see administration instructions Insert 1 enema into the rectum 2 hours after dinner around 6pm  Insert 1 enema into the rectum in the morning prior to your flexible sigmoidoscopy  Dispense: 1 enema; Refill: 0  - MRI pelvis w wo contrast; Future      Follow up appointment: For flexible sigmoidoscopy  ______________________________________________________________________    Chief Complaint   Patient presents with   • Constipation     Achy bowel movements, skinny stool, painful intercourse, pt reports she able to feel a moveable lump inside rectum and also a rough spot  Pt feels she needs to support perineum during BM      HPI:   80-year-old female past medical history of IBS C, gallbladder calculus, anxiety, presenting for follow-up regarding rectal lump  Was last seen in office in May of 2022 where lab work was unremarkable    This included CBC, CMP, celiac, CRP, fobt    Patient states that she has been having chronic episodes of symptoms including complete back duration, rectal pressure, pain with sex, chronic constipation and change in bowel form  She noticed recently on self rectal exam that she could have a pea-sized lump in her anal canal   More towards the posterior section  Patient denies any itching or bleeding in her stool  Has not taking any Bentyl as she has not had any abdominal pain  Does take fiber on a daily basis  Patient stated no reported lightheadedness, dizziness, fevers, chills, nausea, vomiting, dysphagia, heartburn, SOB, CP, abdominal pain, GI bleeding, or unintentional weight loss  GI History:  Blood thinners: Denies ASA, antiplatelet, or anticoagulation  NSAID use: Denies  Insulin use: None    Abdominal Surgical Hx: tubal ligation  Family Hx: Family history negative for IBD or colorectal cancer  GI procedure Hx: No hx of EGD or colonoscopies  GI imaging Hx: None     Historical Information   Past Medical History:   Diagnosis Date   • Abnormal weight gain 10/30/2017   • Cholelithiasis    • COVID 10/10/2021   • Elevated cortisol level 10/28/2021   • Eustachian tube dysfunction, bilateral 10/28/2021   • IBS (irritable bowel syndrome)    • Iron deficiency anemia    • Menorrhagia    • Overweight (BMI 25 0-29  9) 8/6/2022   • Palpitations      Past Surgical History:   Procedure Laterality Date   • TUBAL LIGATION     • WISDOM TOOTH EXTRACTION       Social History     Substance and Sexual Activity   Alcohol Use Not Currently    Comment: socially     Social History     Substance and Sexual Activity   Drug Use Never   • Types: Marijuana    Comment: Has medical marijuana card     Social History     Tobacco Use   Smoking Status Former   Smokeless Tobacco Never     Family History   Problem Relation Age of Onset   • Heart failure Mother    • Hypothyroidism Sister          Current Outpatient Medications:   •  Multiple Vitamins-Minerals (MULTIVITAMIN ADULT) TABS  •  sodium phosphate-biphosphate (FLEET) 7-19 g 118 mL enema  •  dicyclomine (BENTYL) 10 mg capsule  •  polyethylene glycol (MIRALAX) 17 g packet  Allergies   Allergen Reactions   • Lubiprostone Nausea Only and GI Intolerance     Reviewed medications and allergies and updated as indicated    PHYSICAL EXAM:    Blood pressure 108/70, height 5' 4 2" (1 631 m), weight 65 7 kg (144 lb 12 8 oz), not currently breastfeeding  Body mass index is 24 7 kg/m²  General Appearance: NAD, cooperative, alert  Eyes: Anicteric, EOMI  ENT:  Normocephalic, atraumatic  Neck:  Supple, symmetrical, trachea midline  Resp:  Air entry present bilaterally  CV: S1, S2 present    GI:  Soft, non-tender, non-distended; normal bowel sounds; no masses, no organomegaly   Rectal: No mass felt  Musculoskeletal: No cyanosis, clubbing or edema  Skin:  No jaundice, rashes, or lesions   Psych: Normal affect, good eye contact  Neuro: No gross deficits    Lab Results:   Lab Results   Component Value Date    WBC 3 9 05/24/2022    WBC 6 06 03/11/2022    WBC 5 0 10/06/2021    HGB 12 4 05/24/2022    HGB 12 9 03/11/2022    HGB 12 7 10/06/2021    MCV 93 05/24/2022     05/24/2022     03/11/2022     10/06/2021     Lab Results   Component Value Date     09/12/2017    K 4 1 05/24/2022     05/24/2022    CO2 21 05/24/2022    BUN 7 05/24/2022    CREATININE 0 72 05/24/2022    GLUCOSE 88 09/12/2017    CALCIUM 9 2 03/11/2022    AST 15 05/24/2022    AST 14 03/11/2022    AST 20 10/06/2021    ALT 11 05/24/2022    ALT 17 03/11/2022    ALT 13 10/06/2021    ALKPHOS 48 03/11/2022    ALKPHOS 52 01/27/2020    ALKPHOS 40 09/12/2017    PROT 6 4 09/12/2017    BILITOT 0 6 09/12/2017    EGFR 110 05/24/2022     No results found for: IRON, TIBC, FERRITIN  Lab Results   Component Value Date    LIPASE 113 03/11/2022       Radiology Results:   No results found

## 2022-12-06 NOTE — PROGRESS NOTES
[id number] - Outpatient Follow-up Note  Kaden Denny 40 y o  female MRN: [id number]  Encounter: [id number]    ASSESSMENT AND PLAN:      1  Rectal lump  2  Rectal mass  3  Altered bowel habits    Patient presenting with concern of a rectal mass on physical exam   Also with change of bowel forms to more flat stools  No GI bleeding noted  Also with possible concern of rectocele  Will evaluate with MRI of the pelvis to further characterize the anal canal to rule out any malignancy  We will also evaluate with flexible sigmoidoscopy to rule out malignancy with the change of the bowel form  Originally plan for colonoscopy but patient states that she is unable to take oral bowel preparation  If work-up is negative can consider defecography to rule out rectocele  Also possible anorectal manometry if she continues to have issues with bowel movements  - Flexible Sigmoidoscopy; Future  - sodium phosphate-biphosphate (FLEET) 7-19 g 118 mL enema; Insert 1 enema into the rectum see administration instructions Insert 1 enema into the rectum 2 hours after dinner around 6pm  Insert 1 enema into the rectum in the morning prior to your flexible sigmoidoscopy  Dispense: 1 enema; Refill: 0  - MRI pelvis w wo contrast; Future      Follow up appointment: For flexible sigmoidoscopy  ______________________________________________________________________    Chief Complaint   Patient presents with   • Constipation     Achy bowel movements, skinny stool, painful intercourse, pt reports she able to feel a moveable lump inside rectum and also a rough spot  Pt feels she needs to support perineum during BM      HPI:   77-year-old female past medical history of IBS C, gallbladder calculus, anxiety, presenting for follow-up regarding rectal lump  Was last seen in office in May of 2022 where lab work was unremarkable    This included CBC, CMP, celiac, CRP, fobt    Patient states that she has been having chronic episodes of symptoms including complete back duration, rectal pressure, pain with sex, chronic constipation and change in bowel form  She noticed recently on self rectal exam that she could have a pea-sized lump in her anal canal   More towards the posterior section  Patient denies any itching or bleeding in her stool  Has not taking any Bentyl as she has not had any abdominal pain  Does take fiber on a daily basis  Patient stated no reported lightheadedness, dizziness, fevers, chills, nausea, vomiting, dysphagia, heartburn, SOB, CP, abdominal pain, GI bleeding, or unintentional weight loss  GI History:  Blood thinners: Denies ASA, antiplatelet, or anticoagulation  NSAID use: Denies  Insulin use: None    Abdominal Surgical Hx: tubal ligation  Family Hx: Family history negative for IBD or colorectal cancer  GI procedure Hx: No hx of EGD or colonoscopies  GI imaging Hx: None     Historical Information   Past Medical History:   Diagnosis Date   • Abnormal weight gain 10/30/2017   • Cholelithiasis    • COVID 10/10/2021   • Elevated cortisol level 10/28/2021   • Eustachian tube dysfunction, bilateral 10/28/2021   • IBS (irritable bowel syndrome)    • Iron deficiency anemia    • Menorrhagia    • Overweight (BMI 25 0-29  9) 8/6/2022   • Palpitations      Past Surgical History:   Procedure Laterality Date   • TUBAL LIGATION     • WISDOM TOOTH EXTRACTION       Social History     Substance and Sexual Activity   Alcohol Use Not Currently    Comment: socially     Social History     Substance and Sexual Activity   Drug Use Never   • Types: Marijuana    Comment: Has medical marijuana card     Social History     Tobacco Use   Smoking Status Former   Smokeless Tobacco Never     Family History   Problem Relation Age of Onset   • Heart failure Mother    • Hypothyroidism Sister          Current Outpatient Medications:   •  Multiple Vitamins-Minerals (MULTIVITAMIN ADULT) TABS  •  sodium phosphate-biphosphate (FLEET) 7-19 g 118 mL enema  •  dicyclomine (BENTYL) 10 mg capsule  •  polyethylene glycol (MIRALAX) 17 g packet  Allergies   Allergen Reactions   • Lubiprostone Nausea Only and GI Intolerance     Reviewed medications and allergies and updated as indicated    PHYSICAL EXAM:    Blood pressure 108/70, height 5' 4 2" (1 631 m), weight 65 7 kg (144 lb 12 8 oz), not currently breastfeeding  Body mass index is 24 7 kg/m²  General Appearance: NAD, cooperative, alert  Eyes: Anicteric, EOMI  ENT:  Normocephalic, atraumatic  Neck:  Supple, symmetrical, trachea midline  Resp:  Air entry present bilaterally  CV: S1, S2 present    GI:  Soft, non-tender, non-distended; normal bowel sounds; no masses, no organomegaly   Rectal: No mass felt  Musculoskeletal: No cyanosis, clubbing or edema  Skin:  No jaundice, rashes, or lesions   Psych: Normal affect, good eye contact  Neuro: No gross deficits    Lab Results:   Lab Results   Component Value Date    WBC 3 9 05/24/2022    WBC 6 06 03/11/2022    WBC 5 0 10/06/2021    HGB 12 4 05/24/2022    HGB 12 9 03/11/2022    HGB 12 7 10/06/2021    MCV 93 05/24/2022     05/24/2022     03/11/2022     10/06/2021     Lab Results   Component Value Date     09/12/2017    K 4 1 05/24/2022     05/24/2022    CO2 21 05/24/2022    BUN 7 05/24/2022    CREATININE 0 72 05/24/2022    GLUCOSE 88 09/12/2017    CALCIUM 9 2 03/11/2022    AST 15 05/24/2022    AST 14 03/11/2022    AST 20 10/06/2021    ALT 11 05/24/2022    ALT 17 03/11/2022    ALT 13 10/06/2021    ALKPHOS 48 03/11/2022    ALKPHOS 52 01/27/2020    ALKPHOS 40 09/12/2017    PROT 6 4 09/12/2017    BILITOT 0 6 09/12/2017    EGFR 110 05/24/2022     No results found for: IRON, TIBC, FERRITIN  Lab Results   Component Value Date    LIPASE 113 03/11/2022       Radiology Results:   No results found

## 2022-12-07 ENCOUNTER — TELEPHONE (OUTPATIENT)
Dept: GASTROENTEROLOGY | Facility: CLINIC | Age: 37
End: 2022-12-07

## 2022-12-12 ENCOUNTER — HOSPITAL ENCOUNTER (EMERGENCY)
Facility: HOSPITAL | Age: 37
Discharge: HOME | End: 2022-12-12
Attending: EMERGENCY MEDICINE

## 2022-12-12 ENCOUNTER — APPOINTMENT (OUTPATIENT)
Dept: RADIOLOGY | Facility: HOSPITAL | Age: 37
End: 2022-12-12

## 2022-12-12 VITALS
RESPIRATION RATE: 18 BRPM | TEMPERATURE: 97.5 F | HEART RATE: 64 BPM | DIASTOLIC BLOOD PRESSURE: 55 MMHG | OXYGEN SATURATION: 100 % | WEIGHT: 142 LBS | HEIGHT: 64 IN | SYSTOLIC BLOOD PRESSURE: 118 MMHG | BODY MASS INDEX: 24.24 KG/M2

## 2022-12-12 DIAGNOSIS — R55 NEAR SYNCOPE: Primary | ICD-10-CM

## 2022-12-12 LAB
ALBUMIN SERPL BCP-MCNC: 3.8 G/DL (ref 3.5–5)
ALP SERPL-CCNC: 50 U/L (ref 46–116)
ALT SERPL W P-5'-P-CCNC: 18 U/L (ref 12–78)
ANION GAP SERPL CALCULATED.3IONS-SCNC: 7 MMOL/L (ref 4–13)
AST SERPL W P-5'-P-CCNC: 19 U/L (ref 5–45)
ATRIAL RATE: 63 BPM
BASOPHILS # BLD AUTO: 0.06 THOUSANDS/ÂΜL (ref 0–0.1)
BASOPHILS NFR BLD AUTO: 1 % (ref 0–1)
BILIRUB SERPL-MCNC: 0.4 MG/DL (ref 0.2–1)
BUN SERPL-MCNC: 8 MG/DL (ref 5–25)
CALCIUM SERPL-MCNC: 8.9 MG/DL (ref 8.3–10.1)
CARDIAC TROPONIN I PNL SERPL HS: <2 NG/L
CHLORIDE SERPL-SCNC: 106 MMOL/L (ref 96–108)
CO2 SERPL-SCNC: 29 MMOL/L (ref 21–32)
CREAT SERPL-MCNC: 0.71 MG/DL (ref 0.6–1.3)
EOSINOPHIL # BLD AUTO: 0.09 THOUSAND/ÂΜL (ref 0–0.61)
EOSINOPHIL NFR BLD AUTO: 2 % (ref 0–6)
ERYTHROCYTE [DISTWIDTH] IN BLOOD BY AUTOMATED COUNT: 12.6 % (ref 11.6–15.1)
FLUAV RNA RESP QL NAA+PROBE: NEGATIVE
FLUBV RNA RESP QL NAA+PROBE: NEGATIVE
GFR SERPL CREATININE-BSD FRML MDRD: 109 ML/MIN/1.73SQ M
GLUCOSE SERPL-MCNC: 108 MG/DL (ref 65–140)
HCT VFR BLD AUTO: 38 % (ref 34.8–46.1)
HGB BLD-MCNC: 12.7 G/DL (ref 11.5–15.4)
IMM GRANULOCYTES # BLD AUTO: 0.01 THOUSAND/UL (ref 0–0.2)
IMM GRANULOCYTES NFR BLD AUTO: 0 % (ref 0–2)
LYMPHOCYTES # BLD AUTO: 1.07 THOUSANDS/ÂΜL (ref 0.6–4.47)
LYMPHOCYTES NFR BLD AUTO: 20 % (ref 14–44)
MCH RBC QN AUTO: 31.2 PG (ref 26.8–34.3)
MCHC RBC AUTO-ENTMCNC: 33.4 G/DL (ref 31.4–37.4)
MCV RBC AUTO: 93 FL (ref 82–98)
MONOCYTES # BLD AUTO: 0.36 THOUSAND/ÂΜL (ref 0.17–1.22)
MONOCYTES NFR BLD AUTO: 7 % (ref 4–12)
NEUTROPHILS # BLD AUTO: 3.8 THOUSANDS/ÂΜL (ref 1.85–7.62)
NEUTS SEG NFR BLD AUTO: 70 % (ref 43–75)
NRBC BLD AUTO-RTO: 0 /100 WBCS
P AXIS: -16 DEGREES
PLATELET # BLD AUTO: 271 THOUSANDS/UL (ref 149–390)
PMV BLD AUTO: 10.1 FL (ref 8.9–12.7)
POTASSIUM SERPL-SCNC: 3.9 MMOL/L (ref 3.5–5.3)
PR INTERVAL: 130 MS
PROT SERPL-MCNC: 7.3 G/DL (ref 6.4–8.4)
QRS AXIS: -20 DEGREES
QRSD INTERVAL: 86 MS
QT INTERVAL: 448 MS
QTC INTERVAL: 458 MS
RBC # BLD AUTO: 4.07 MILLION/UL (ref 3.81–5.12)
RSV RNA RESP QL NAA+PROBE: NEGATIVE
SARS-COV-2 RNA RESP QL NAA+PROBE: NEGATIVE
SODIUM SERPL-SCNC: 142 MMOL/L (ref 135–147)
T WAVE AXIS: -23 DEGREES
VENTRICULAR RATE: 63 BPM
WBC # BLD AUTO: 5.39 THOUSAND/UL (ref 4.31–10.16)

## 2022-12-12 NOTE — ED PROVIDER NOTES
History  Chief Complaint   Patient presents with   • Dizziness     Patient reports feeling dizzy and light headed this am  States that she took a natural energy drink and her heart rate was still below 55bpm  States that right now she feels "off" and cold, and if she does too much she feels like shes going to pass out  39 yo F with h/o panic attacks, IBS-C, cholelithiasis felt weak, lightheaded and presyncopal this morning after drinking two natural green tea "energy drinks" and no breakfast   She found her pulse to be slow instead of fast   She went home  Ate salad and layed down  She then felt as if she was having a panic attack and came here  She feels much better now  Back to baseline  Denies headache, vertigo, palpitations  CP, dyspnea, recent fever, cough, GI and  sx's  Denies change in diet, activity and medications  Mother has afib  No personal or family thrombophilia  Prior to Admission Medications   Prescriptions Last Dose Informant Patient Reported? Taking? Multiple Vitamins-Minerals (MULTIVITAMIN ADULT) TABS   Yes No   Sig: Take 1 tablet by mouth   dicyclomine (BENTYL) 10 mg capsule   No No   Sig: TAKE 1 CAPSULE (10 MG TOTAL) BY MOUTH AS NEEDED IN THE MORNING AND 1 CAPSULE (10 MG TOTAL) AS NEEDED AT NOON AND 1 CAPSULE (10 MG TOTAL) AS NEEDED IN THE EVENING (ABDOMINAL PAIN)  polyethylene glycol (MIRALAX) 17 g packet   No No   Sig: Take 17 gm (1 scoop) daily   sodium phosphate-biphosphate (FLEET) 7-19 g 118 mL enema   No No   Sig: Insert 1 enema into the rectum see administration instructions Insert 1 enema into the rectum 2 hours after dinner around 6pm  Insert 1 enema into the rectum in the morning prior to your flexible sigmoidoscopy        Facility-Administered Medications: None       Past Medical History:   Diagnosis Date   • Abnormal weight gain 10/30/2017   • Cholelithiasis    • COVID 10/10/2021   • Elevated cortisol level 10/28/2021   • Eustachian tube dysfunction, bilateral 10/28/2021   • IBS (irritable bowel syndrome)    • Iron deficiency anemia    • Menorrhagia    • Overweight (BMI 25 0-29  9) 8/6/2022   • Palpitations        Past Surgical History:   Procedure Laterality Date   • TUBAL LIGATION     • WISDOM TOOTH EXTRACTION         Family History   Problem Relation Age of Onset   • Heart failure Mother    • Hypothyroidism Sister      I have reviewed and agree with the history as documented  E-Cigarette/Vaping   • E-Cigarette Use Never User      E-Cigarette/Vaping Substances   • Nicotine No    • THC No    • CBD No    • Flavoring No    • Other No    • Unknown No      Social History     Tobacco Use   • Smoking status: Former   • Smokeless tobacco: Never   Vaping Use   • Vaping Use: Never used   Substance Use Topics   • Alcohol use: Not Currently     Comment: socially   • Drug use: Never     Types: Marijuana     Comment: Has medical marijuana card       Review of Systems   Constitutional: Positive for fatigue  Negative for chills, diaphoresis and fever  HENT: Negative for congestion  Eyes: Positive for visual disturbance  Respiratory: Negative for cough and shortness of breath  Cardiovascular: Negative for chest pain, palpitations and leg swelling  Gastrointestinal: Negative for abdominal pain, diarrhea and vomiting  Genitourinary: Negative for dysuria, flank pain and hematuria  Musculoskeletal: Negative for arthralgias and gait problem  Skin: Negative for rash  Neurological: Positive for weakness and light-headedness  Negative for dizziness, seizures, numbness and headaches  All other systems reviewed and are negative  Physical Exam  Physical Exam  Vitals and nursing note reviewed  Constitutional:       General: She is not in acute distress  Appearance: She is well-developed and normal weight  She is not ill-appearing or diaphoretic  HENT:      Head: Normocephalic and atraumatic        Right Ear: External ear normal       Left Ear: External ear normal       Nose: Nose normal       Mouth/Throat:      Mouth: Mucous membranes are moist       Pharynx: Oropharynx is clear  Eyes:      General: No scleral icterus  Conjunctiva/sclera: Conjunctivae normal       Pupils: Pupils are equal, round, and reactive to light  Neck:      Vascular: No carotid bruit  Cardiovascular:      Rate and Rhythm: Normal rate and regular rhythm  Pulses: Normal pulses  Heart sounds: Normal heart sounds  No murmur heard  Pulmonary:      Effort: Pulmonary effort is normal       Breath sounds: Normal breath sounds  Abdominal:      General: Bowel sounds are normal       Palpations: Abdomen is soft  Tenderness: There is no abdominal tenderness  There is no guarding or rebound  Musculoskeletal:         General: No tenderness  Normal range of motion  Cervical back: Normal range of motion and neck supple  No rigidity or tenderness  Right lower leg: No edema  Left lower leg: No edema  Skin:     General: Skin is warm and dry  Capillary Refill: Capillary refill takes less than 2 seconds  Coloration: Skin is not pale  Findings: No bruising, lesion or rash  Neurological:      General: No focal deficit present  Mental Status: She is alert and oriented to person, place, and time  Mental status is at baseline  Cranial Nerves: No cranial nerve deficit  Sensory: No sensory deficit  Motor: No weakness  Coordination: Coordination normal       Gait: Gait normal       Deep Tendon Reflexes: Reflexes are normal and symmetric     Psychiatric:         Mood and Affect: Mood normal          Behavior: Behavior normal          Vital Signs  ED Triage Vitals [12/12/22 1447]   Temperature Pulse Respirations Blood Pressure SpO2   97 5 °F (36 4 °C) 64 16 128/67 98 %      Temp Source Heart Rate Source Patient Position - Orthostatic VS BP Location FiO2 (%)   Temporal Monitor Sitting Left arm --      Pain Score       --           Vitals: 12/12/22 1447 12/12/22 1645   BP: 128/67 118/55   Pulse: 64 64   Patient Position - Orthostatic VS: Sitting Sitting         Visual Acuity  Visual Acuity    Flowsheet Row Most Recent Value   L Pupil Size (mm) 3   R Pupil Size (mm) 3          ED Medications  Medications - No data to display    Diagnostic Studies  Results Reviewed     Procedure Component Value Units Date/Time    COVID/FLU/RSV [[id number]]  (Normal) Collected: 12/12/22 1457    Lab Status: Final result Specimen: Nares from Nose Updated: 12/12/22 1600     SARS-CoV-2 Negative     INFLUENZA A PCR Negative     INFLUENZA B PCR Negative     RSV PCR Negative    Narrative:      FOR PEDIATRIC PATIENTS - copy/paste COVID Guidelines URL to browser: [email or web address]  [employer]    SARS-CoV-2 assay is a Nucleic Acid Amplification assay intended for the  qualitative detection of nucleic acid from SARS-CoV-2 in nasopharyngeal  swabs  Results are for the presumptive identification of SARS-CoV-2 RNA  Positive results are indicative of infection with SARS-CoV-2, the virus  causing COVID-19, but do not rule out bacterial infection or co-infection  with other viruses  Laboratories within the [place] and its  territories are required to report all positive results to the appropriate  public health authorities  Negative results do not preclude SARS-CoV-2  infection and should not be used as the sole basis for treatment or other  patient management decisions  Negative results must be combined with  clinical observations, patient history, and epidemiological information  This test has not been FDA cleared or approved  This test has been authorized by FDA under an Emergency Use Authorization  (EUA)   This test is only authorized for the duration of time the  declaration that circumstances exist justifying the authorization of the  emergency use of an in vitro diagnostic tests for detection of SARS-CoV-2  virus and/or diagnosis of COVID-19 infection under section 564(b)(1) of  the Act, 21 U  S C  775VBS-6(W)(0), unless the authorization is terminated  or revoked sooner  The test has been validated but independent review by FDA  and CLIA is pending  Test performed using [employer]: This RT-PCR assay targets N2,  a region unique to SARS-CoV-2  A conserved region in the E-gene was chosen  for pan-Sarbecovirus detection which includes SARS-CoV-2  According to CMS-2020-01-R, this platform meets the definition of high-throughput technology      HS Troponin 0hr (reflex protocol) [[id number]]  (Normal) Collected: 12/12/22 1457    Lab Status: Final result Specimen: Blood from Arm, Right Updated: 12/12/22 1538     hs TnI 0hr <2 ng/L     Comprehensive metabolic panel [[id number]] Collected: 12/12/22 1457    Lab Status: Final result Specimen: Blood from Arm, Right Updated: 12/12/22 1530     Sodium 142 mmol/L      Potassium 3 9 mmol/L      Chloride 106 mmol/L      CO2 29 mmol/L      ANION GAP 7 mmol/L      BUN 8 mg/dL      Creatinine 0 71 mg/dL      Glucose 108 mg/dL      Calcium 8 9 mg/dL      AST 19 U/L      ALT 18 U/L      Alkaline Phosphatase 50 U/L      Total Protein 7 3 g/dL      Albumin 3 8 g/dL      Total Bilirubin 0 40 mg/dL      eGFR 109 ml/min/1 73sq m     Narrative:      Harshad guidelines for Chronic Kidney Disease (CKD):   •  Stage 1 with normal or high GFR (GFR > 90 mL/min/1 73 square meters)  •  Stage 2 Mild CKD (GFR = 60-89 mL/min/1 73 square meters)  •  Stage 3A Moderate CKD (GFR = 45-59 mL/min/1 73 square meters)  •  Stage 3B Moderate CKD (GFR = 30-44 mL/min/1 73 square meters)  •  Stage 4 Severe CKD (GFR = 15-29 mL/min/1 73 square meters)  •  Stage 5 End Stage CKD (GFR <15 mL/min/1 73 square meters)  Note: GFR calculation is accurate only with a steady state creatinine    CBC and differential [[id number]] Collected: 12/12/22 1457    Lab Status: Final result Specimen: Blood from Arm, Right Updated: 12/12/22 1504     WBC 5 39 Thousand/uL      RBC 4 07 Million/uL      Hemoglobin 12 7 g/dL      Hematocrit 38 0 %      MCV 93 fL      MCH 31 2 pg      MCHC 33 4 g/dL      RDW 12 6 %      MPV 10 1 fL      Platelets 900 Thousands/uL      nRBC 0 /100 WBCs      Neutrophils Relative 70 %      Immat GRANS % 0 %      Lymphocytes Relative 20 %      Monocytes Relative 7 %      Eosinophils Relative 2 %      Basophils Relative 1 %      Neutrophils Absolute 3 80 Thousands/µL      Immature Grans Absolute 0 01 Thousand/uL      Lymphocytes Absolute 1 07 Thousands/µL      Monocytes Absolute 0 36 Thousand/µL      Eosinophils Absolute 0 09 Thousand/µL      Basophils Absolute 0 06 Thousands/µL                  XR chest pa & lateral   ED Interpretation by Mimi Burkitt, DO (12/12 1622)   unremarkable      Final Result by Mary Colby MD (12/12 1609)      No acute cardiopulmonary disease  Workstation performed: [id number]                    Procedures  ECG 12 Lead Documentation Only    Date/Time: 12/12/2022 4:23 PM  Performed by: Mimi Burkitt, DO  Authorized by: Mimi Burkitt, DO     ECG reviewed by me, the ED Provider: yes    Patient location:  ED  Previous ECG:     Previous ECG:  Unavailable    Comparison to cardiac monitor: Yes    Rate:     ECG rate assessment: normal    Rhythm:     Rhythm: sinus rhythm    Ectopy:     Ectopy: none    QRS:     QRS axis:  Left    QRS intervals:  Normal  Conduction:     Conduction: normal    ST segments:     ST segments:  Normal  T waves:     T waves: normal    Q waves:     Q waves:  III and aVF             ED Course  ED Course as of 12/12/22 2327   Mon Dec 12, 2022   1644 Normal exam and vital signs  Will repeat EKG since current one shows Q's in inferior leads - these were not present in March 1645 Repeat EKG does not show Q's inferior leads; the earlier EKG was performed technically incorrectly  [phone] Patient's repeat EKG shows no inferior Q's   Feels back to baseline  Ambulated without problem  VSS  MDM  Number of Diagnoses or Management Options  Near syncope: new and requires workup  Diagnosis management comments: 41 yo female with transient lightheadedness, feelings of presyncope  Concerned that her pulse was about 55 bpm   May have had panic attack at that time  Feels back to baseline  Will check labs, EKG, ambulate to check for dysrhythmia, dehydration, anemia, electrolyte abnormality, viral infection, etc     Labs, reassessment reassuring  Stable for d/c  Amount and/or Complexity of Data Reviewed  Clinical lab tests: ordered and reviewed  Tests in the radiology section of CPT®: ordered and reviewed  Review and summarize past medical records: yes  Independent visualization of images, tracings, or specimens: yes        Disposition  Final diagnoses:   Near syncope     Time reflects when diagnosis was documented in both MDM as applicable and the Disposition within this note     Time User Action Codes Description Comment    12/12/2022  5:09 PM Kandice Mendiola Add [R55] Near syncope       ED Disposition     ED Disposition   Discharge    Condition   Stable    Date/Time   Mon Dec 12, 2022  5:09 PM    Comment   Mary Ferguson discharge to home/self care  Follow-up Information     Follow up With Specialties Details Why [facility], DO Family Medicine Call  As needed [facility]  [phone]            Discharge Medication List as of 12/12/2022  5:16 PM      CONTINUE these medications which have NOT CHANGED    Details   dicyclomine (BENTYL) 10 mg capsule TAKE 1 CAPSULE (10 MG TOTAL) BY MOUTH AS NEEDED IN THE MORNING AND 1 CAPSULE (10 MG TOTAL) AS NEEDED AT NOON AND 1 CAPSULE (10 MG TOTAL) AS NEEDED IN THE EVENING (ABDOMINAL PAIN)  , Starting Fri 8/26/2022, Until Thu 10/6/2022 at 2359, Normal      Multiple Vitamins-Minerals (MULTIVITAMIN ADULT) TABS Take 1 tablet by mouth, Historical Med      polyethylene glycol (MIRALAX) 17 g packet Take 17 gm (1 scoop) daily, Normal      sodium phosphate-biphosphate (FLEET) 7-19 g 118 mL enema Insert 1 enema into the rectum see administration instructions Insert 1 enema into the rectum 2 hours after dinner around 6pm  Insert 1 enema into the rectum in the morning prior to your flexible sigmoidoscopy , Starting Tue 12/6/2022, Normal             No discharge procedures on file      PDMP Review       Value Time User    PDMP Reviewed  Yes 9/2/2022  5:03 PM DO Yaw          ED Provider  Electronically Signed by           Prince Garcia DO  12/12/22 7490

## 2022-12-12 NOTE — DISCHARGE INSTRUCTIONS
Today's vital signs, exam, EKG, monitoring and lab results do not show a cause for your symptoms  You are stable to be discharged  Keep well hydrated, get enough sleep and eat a balanced diet  Avoid stimulants, alcohol  Avoid hazardous activities until this resolves  Follow up with your PCP if not better tomorrow  Your symptoms may be due to a common viral infection  If so, your symptoms may take several days to improve

## 2022-12-13 NOTE — TELEPHONE ENCOUNTER
Scheduled date of colonoscopy (as of today):01/04/23  Physician performing colonoscopy:Dr Heath Marquez  Location of colonoscopy:[facility]  Bowel prep reviewed with patient:2 fleet enemas  Instructions reviewed with patient by:Gave the patient the folder to take home and read  Clearances: No good minus

## 2022-12-16 LAB
ATRIAL RATE: 58 BPM
P AXIS: 77 DEGREES
PR INTERVAL: 128 MS
QRS AXIS: 78 DEGREES
QRSD INTERVAL: 86 MS
QT INTERVAL: 464 MS
QTC INTERVAL: 455 MS
T WAVE AXIS: 77 DEGREES
VENTRICULAR RATE: 58 BPM

## 2022-12-21 ENCOUNTER — NURSE TRIAGE (OUTPATIENT)
Dept: OTHER | Facility: OTHER | Age: 37
End: 2022-12-21

## 2022-12-21 NOTE — TELEPHONE ENCOUNTER
Regarding: Procedure prep  ----- Message from Yesica Garduno sent at 12/21/2022  9:44 AM EST -----  "I'm having a GI FLEXIBLE SIGMOIDOSCOPY tomorrow   I'm not supposed to have anything to eat or drink after midnight, but I want to know if I can have water tomorrow morning leading up to the appt "

## 2022-12-21 NOTE — TELEPHONE ENCOUNTER
Patient called in to confirm she can have a small sip of water on 12/22 AM since her procedure is at 1:30 PM  Triage RN confirmed with patient she can have a small amount of water  Please follow up with patient if provider decides to not allow  Reason for Disposition  • Bowel prep for colonoscopy, questions about    Answer Assessment - Initial Assessment Questions  1  DATE/TIME: "When did you have your colonoscopy?"       Scheduled for Flexible Sigmoidoscopy 12/22/22 at 1:30 PM  2  MAIN CONCERN: "What is your main concern right now?" "What questions do you have?"      Can I have a little water in the AM leading up to the procedure?     Protocols used: COLONOSCOPY SYMPTOMS AND QUESTIONS-ADULT-

## 2022-12-22 ENCOUNTER — HOSPITAL ENCOUNTER (OUTPATIENT)
Dept: GASTROENTEROLOGY | Facility: AMBULATORY SURGERY CENTER | Age: 37
Discharge: HOME | End: 2022-12-22
Attending: INTERNAL MEDICINE

## 2022-12-22 ENCOUNTER — ANESTHESIA (OUTPATIENT)
Dept: GASTROENTEROLOGY | Facility: AMBULATORY SURGERY CENTER | Age: 37
End: 2022-12-22

## 2022-12-22 ENCOUNTER — ANESTHESIA EVENT (OUTPATIENT)
Dept: GASTROENTEROLOGY | Facility: AMBULATORY SURGERY CENTER | Age: 37
End: 2022-12-22

## 2022-12-22 VITALS
TEMPERATURE: 97.9 F | BODY MASS INDEX: 24.59 KG/M2 | SYSTOLIC BLOOD PRESSURE: 120 MMHG | DIASTOLIC BLOOD PRESSURE: 56 MMHG | RESPIRATION RATE: 20 BRPM | HEIGHT: 64 IN | WEIGHT: 144 LBS | HEART RATE: 65 BPM | OXYGEN SATURATION: 100 %

## 2022-12-22 DIAGNOSIS — R19.4 ALTERED BOWEL HABITS: ICD-10-CM

## 2022-12-22 DIAGNOSIS — K62.89 RECTAL LUMP: ICD-10-CM

## 2022-12-22 RX ORDER — SODIUM CHLORIDE, SODIUM LACTATE, POTASSIUM CHLORIDE, CALCIUM CHLORIDE 600; 310; 30; 20 MG/100ML; MG/100ML; MG/100ML; MG/100ML
50 INJECTION, SOLUTION INTRAVENOUS CONTINUOUS
Status: DISCONTINUED | OUTPATIENT
Start: 2022-12-22 | End: 2022-12-26 | Stop reason: HOSPADM

## 2022-12-22 RX ORDER — PROPOFOL 10 MG/ML
INJECTION, EMULSION INTRAVENOUS AS NEEDED
Status: DISCONTINUED | OUTPATIENT
Start: 2022-12-22 | End: 2022-12-22

## 2022-12-22 RX ORDER — LIDOCAINE HYDROCHLORIDE 20 MG/ML
INJECTION, SOLUTION EPIDURAL; INFILTRATION; INTRACAUDAL; PERINEURAL AS NEEDED
Status: DISCONTINUED | OUTPATIENT
Start: 2022-12-22 | End: 2022-12-22

## 2022-12-22 RX ADMIN — PROPOFOL 100 MG: 10 INJECTION, EMULSION INTRAVENOUS at 14:17

## 2022-12-22 RX ADMIN — PROPOFOL 50 MG: 10 INJECTION, EMULSION INTRAVENOUS at 14:19

## 2022-12-22 RX ADMIN — PROPOFOL 100 MG: 10 INJECTION, EMULSION INTRAVENOUS at 14:14

## 2022-12-22 RX ADMIN — SODIUM CHLORIDE, SODIUM LACTATE, POTASSIUM CHLORIDE, CALCIUM CHLORIDE 50 ML/HR: 600; 310; 30; 20 INJECTION, SOLUTION INTRAVENOUS at 13:59

## 2022-12-22 RX ADMIN — LIDOCAINE HYDROCHLORIDE 50 MG: 20 INJECTION, SOLUTION EPIDURAL; INFILTRATION; INTRACAUDAL; PERINEURAL at 14:14

## 2022-12-22 NOTE — ANESTHESIA POSTPROCEDURE EVALUATION
Post-Op Assessment Note    CV Status:  Stable  Pain Score: 0    Pain management: adequate     Mental Status:  Sleepy   Hydration Status:  Stable   PONV Controlled:  None   Airway Patency:  Patent      Post Op Vitals Reviewed: Yes      Staff: CRNA         No notable events documented      BP   103/74   Temp      Pulse  65   Resp   20   SpO2   100

## 2022-12-22 NOTE — INTERVAL H&P NOTE
H&P reviewed  After examining the patient I find no changes in the patients condition since the H&P had been written      Vitals:    12/22/22 1354   BP: 115/68   Pulse: 73   Resp: 20   Temp: 97 9 °F (36 6 °C)   SpO2: 100% Composite Graft Text: The defect edges were debeveled with a #15 scalpel blade.  Given the location of the defect, shape of the defect, the proximity to free margins and the fact the defect was full thickness a composite graft was deemed most appropriate.  The defect was outline and then transferred to the donor site.  A full thickness graft was then excised from the donor site. The graft was then placed in the primary defect, oriented appropriately and then sutured into place.  The secondary defect was then repaired using a primary closure.

## 2022-12-22 NOTE — ANESTHESIA PREPROCEDURE EVALUATION
Procedure:  FLEXIBLE SIGMOIDOSCOPY    Relevant Problems   ENDO   (+) Hypothyroidism due to acquired atrophy of thyroid      HEMATOLOGY   (+) Iron deficiency anemia due to chronic blood loss      NEURO/PSYCH   (+) PTSD (post-traumatic stress disorder)        Physical Exam    Airway    Mallampati score: II  TM Distance: >3 FB  Neck ROM: full     Dental   No notable dental hx     Cardiovascular      Pulmonary      Other Findings        Anesthesia Plan  ASA Score- 2     Anesthesia Type- IV sedation with anesthesia with ASA Monitors  Additional Monitors:   Airway Plan:           Plan Factors-Exercise tolerance (METS): >4 METS  Chart reviewed  Existing labs reviewed  Patient summary reviewed  Patient is not a current smoker  Induction- intravenous  Postoperative Plan-     Informed Consent- Anesthetic plan and risks discussed with patient  I personally reviewed this patient with the CRNA  Discussed and agreed on the Anesthesia Plan with the CRNA  Nivia Cushing

## 2023-02-21 ENCOUNTER — TELEPHONE (OUTPATIENT)
Dept: CARDIOLOGY CLINIC | Facility: CLINIC | Age: 38
End: 2023-02-21

## 2023-02-21 NOTE — TELEPHONE ENCOUNTER
Called patient and left a message to see if she would like to reschedule the appointment she cancelled  With Dr Natalie Little

## 2023-05-18 ENCOUNTER — OFFICE VISIT (OUTPATIENT)
Dept: FAMILY MEDICINE CLINIC | Facility: CLINIC | Age: 38
End: 2023-05-18

## 2023-05-18 VITALS
TEMPERATURE: 98.9 F | OXYGEN SATURATION: 100 % | HEIGHT: 64 IN | SYSTOLIC BLOOD PRESSURE: 128 MMHG | HEART RATE: 70 BPM | BODY MASS INDEX: 25.47 KG/M2 | WEIGHT: 149.2 LBS | RESPIRATION RATE: 17 BRPM | DIASTOLIC BLOOD PRESSURE: 82 MMHG

## 2023-05-18 DIAGNOSIS — E66.3 OVERWEIGHT (BMI 25.0-29.9): ICD-10-CM

## 2023-05-18 DIAGNOSIS — Z76.89 ENCOUNTER FOR WEIGHT MANAGEMENT: Primary | ICD-10-CM

## 2023-05-18 NOTE — ASSESSMENT & PLAN NOTE
The patient will continue with the medication as ordered  She will continue with her diet and exercise

## 2023-05-18 NOTE — ASSESSMENT & PLAN NOTE
The patient is tolerating this dose of the phentermine very well  She has lost some weight since her last visit but she just started the medication  We will continue her on the same dose for no more than 12 weeks  She will continue to work on improving her diet and exercise  We will see her back in the office as scheduled  We reviewed her lab work and everything came back within the normal range

## 2023-05-18 NOTE — PROGRESS NOTES
Assessment/Plan:  Problem List Items Addressed This Visit        Other    Encounter for weight management - Primary     The patient is tolerating this dose of the phentermine very well  She has lost some weight since her last visit but she just started the medication  We will continue her on the same dose for no more than 12 weeks  She will continue to work on improving her diet and exercise  We will see her back in the office as scheduled  We reviewed her lab work and everything came back within the normal range  Overweight (BMI 25 0-29  9)     The patient will continue with the medication as ordered  She will continue with her diet and exercise  Return in about 2 months (around 7/18/2023) for Recheck  I spent 10 minutes during the visit reviewing the history from the patient, performing the examination, discussing the findings with the patient, providing counseling and education, and making a plan  I spent 10 minutes ordering referrals and testing and documenting  Subjective:   Chief Complaint   Patient presents with   • Follow-up     1 month follow up  Started phentermine 15mg at last visit  Patient ID: Casey Mayfield is a 45 y o  female presents today for a follow up of weight loss  Casey Mayfield is a 45 y o  female who presents today for follow-up of her weight management  She is doing well on the Phentermine and feels fine on the   The patient denies any chest pain, shortness of breath, or palpitations  There is no edema  There are no headaches or visual changes  There is no lightheadedness, dizziness, or fainting spells  The patient currently denies any nausea, vomiting, or GERD symptoms  she has normal bowel movements and normal urine output  she has a normal appetite  She is having relief with the IBS  She does continue with her regular diet and exercise  She is doing a better job with restricting her portions        The following portions of the patient's history were reviewed and updated as appropriate: allergies, current medications, past family history, past medical history, past social history, past surgical history and problem list   Patient Active Problem List   Diagnosis   • Cholelithiasis without obstruction   • Chronic constipation   • Hypothyroidism due to acquired atrophy of thyroid   • Iron deficiency anemia due to chronic blood loss   • Pulmonary nodule seen on imaging study   • Vitamin D deficiency   • PTSD (post-traumatic stress disorder)   • Medical marijuana use   • Irritable bowel syndrome with constipation   • Overweight (BMI 25 0-29  9)   • Encounter for weight management     Past Medical History:   Diagnosis Date   • Abnormal weight gain 10/30/2017   • Cholelithiasis    • COVID 10/10/2021   • Elevated cortisol level 10/28/2021   • Eustachian tube dysfunction, bilateral 10/28/2021   • IBS (irritable bowel syndrome)    • Inflammatory bowel disease    • Iron deficiency anemia    • Menorrhagia    • Overweight (BMI 25 0-29 9) 2022   • Palpitations      Past Surgical History:   Procedure Laterality Date   • DILATION AND CURETTAGE OF UTERUS     • WISDOM TOOTH EXTRACTION       Allergies   Allergen Reactions   • Lubiprostone Nausea Only and GI Intolerance     Family History   Problem Relation Age of Onset   • Heart failure Mother    • Colon polyps Mother    • Hypothyroidism Sister      Social History     Socioeconomic History   • Marital status: /Civil Union     Spouse name: Not on file   • Number of children: 4   • Years of education: Not on file   • Highest education level: Not on file   Occupational History   • Occupation: homemaker   Tobacco Use   • Smoking status: Former     Packs/day: 1 00     Years: 5 00     Pack years: 5 00     Types: Cigarettes     Quit date: 2018     Years since quittin 3     Passive exposure: Past   • Smokeless tobacco: Never   Vaping Use   • Vaping Use: Former   • Quit date: 2018   • Substances: Nicotine, THC   Substance and Sexual Activity   • Alcohol use: Not Currently     Comment: socially   • Drug use: Yes     Frequency: 7 0 times per week     Types: Marijuana     Comment: Has medical marijuana card, last used yesterday   • Sexual activity: Yes     Partners: Male     Birth control/protection: Condom Male   Other Topics Concern   • Not on file   Social History Narrative   • Not on file     Social Determinants of Health     Financial Resource Strain: Not on file   Food Insecurity: Not on file   Transportation Needs: Not on file   Physical Activity: Sufficiently Active   • Days of Exercise per Week: 3 days   • Minutes of Exercise per Session: 60 min   Stress: Stress Concern Present   • Feeling of Stress : Rather much   Social Connections: Not on file   Intimate Partner Violence: Not At Risk   • Fear of Current or Ex-Partner: No   • Emotionally Abused: No   • Physically Abused: No   • Sexually Abused: No   Housing Stability: Not on file     Current Outpatient Medications on File Prior to Visit   Medication Sig Dispense Refill   • Multiple Vitamins-Minerals (MULTIVITAMIN ADULT) TABS Take 1 tablet by mouth     • phentermine 15 MG capsule Take 1 capsule (15 mg total) by mouth every morning 30 capsule 2     No current facility-administered medications on file prior to visit  Review of Systems   Constitutional: Negative  HENT: Negative  Eyes: Negative  Respiratory: Negative  Cardiovascular: Negative  Gastrointestinal: Negative  Endocrine: Negative  Genitourinary: Negative  Musculoskeletal: Negative  Skin: Negative  Allergic/Immunologic: Negative  Neurological: Negative  Hematological: Negative  Psychiatric/Behavioral: Negative          Objective:  Vitals:    05/18/23 1106   BP: 128/82   BP Location: Right arm   Patient Position: Sitting   Cuff Size: Standard   Pulse: 70   Resp: 17   Temp: 98 9 °F (37 2 °C)   TempSrc: Tympanic   SpO2: 100%   Weight: 67 7 kg (149 lb 3 2 "oz)   Height: 5' 4\" (1 626 m)     Body mass index is 25 61 kg/m²  Physical Exam  Constitutional:       Appearance: She is well-developed  HENT:      Head: Normocephalic and atraumatic  Mouth/Throat:      Pharynx: No oropharyngeal exudate  Eyes:      Conjunctiva/sclera: Conjunctivae normal       Pupils: Pupils are equal, round, and reactive to light  Neck:      Thyroid: No thyromegaly  Vascular: No JVD  Trachea: No tracheal deviation  Cardiovascular:      Rate and Rhythm: Normal rate and regular rhythm  Heart sounds: Normal heart sounds  No murmur heard  No friction rub  No gallop  Pulmonary:      Effort: Pulmonary effort is normal  No respiratory distress  Breath sounds: Normal breath sounds  No stridor  No wheezing or rales  Chest:      Chest wall: No tenderness  Abdominal:      General: Bowel sounds are normal  There is no distension  Palpations: Abdomen is soft  There is no mass  Tenderness: There is no abdominal tenderness  There is no guarding or rebound  Musculoskeletal:         General: No tenderness or deformity  Normal range of motion  Cervical back: Normal range of motion  Lymphadenopathy:      Cervical: No cervical adenopathy  Skin:     General: Skin is warm and dry  Neurological:      Mental Status: She is alert and oriented to person, place, and time  Cranial Nerves: No cranial nerve deficit  Motor: No abnormal muscle tone  Coordination: Coordination normal       Deep Tendon Reflexes: Reflexes are normal and symmetric   Reflexes normal          Office Visit on 04/18/2023   Component Date Value   • White Blood Cell Count 05/16/2023 4 5    • Red Blood Cell Count 05/16/2023 4 21    • Hemoglobin 05/16/2023 12 7    • HCT 05/16/2023 38 2    • MCV 05/16/2023 91    • MCH 05/16/2023 30 2    • MCHC 05/16/2023 33 2    • RDW 05/16/2023 12 8    • Platelet Count 34/18/8625 250    • Neutrophils 05/16/2023 52    • Lymphocytes 05/16/2023 " 33    • Monocytes 05/16/2023 9    • Eosinophils 05/16/2023 4    • Basophils PCT 05/16/2023 2    • Neutrophils (Absolute) 05/16/2023 2 4    • Lymphocytes (Absolute) 05/16/2023 1 5    • Monocytes (Absolute) 05/16/2023 0 4    • Eosinophils (Absolute) 05/16/2023 0 2    • Basophils ABS 05/16/2023 0 1    • Immature Granulocytes 05/16/2023 0    • Immature Granulocytes (A* 05/16/2023 0 0    • Glucose, Random 05/16/2023 96    • BUN 05/16/2023 9    • Creatinine 05/16/2023 0 79    • eGFR 05/16/2023 98    • SL AMB BUN/CREATININE RA* 05/16/2023 11    • Sodium 05/16/2023 141    • Potassium 05/16/2023 4 5    • Chloride 05/16/2023 105    • CO2 05/16/2023 23    • CALCIUM 05/16/2023 9 5    • Protein, Total 05/16/2023 6 7    • Albumin 05/16/2023 4 4    • Globulin, Total 05/16/2023 2 3    • Albumin/Globulin Ratio 05/16/2023 1 9    • TOTAL BILIRUBIN 05/16/2023 0 5    • Alk Phos Isoenzymes 05/16/2023 47    • AST 05/16/2023 18    • ALT 05/16/2023 10    • LDL Direct 05/16/2023 90    • Cholesterol, Total 05/16/2023 181    • Triglycerides 05/16/2023 49    • HDL 05/16/2023 86    • VLDL Cholesterol Calcula* 05/16/2023 10    • LDL Calculated 05/16/2023 85    • T   Chol/HDL Ratio 05/16/2023 2 1    • Free t4 05/16/2023 0 92    • TSH 05/16/2023 3 120

## 2023-06-28 ENCOUNTER — TELEPHONE (OUTPATIENT)
Dept: GASTROENTEROLOGY | Facility: CLINIC | Age: 38
End: 2023-06-28

## 2023-06-28 NOTE — TELEPHONE ENCOUNTER
Message was left at phone number that was available on the EMR  Notified patient that she may be getting a call this afternoon in order to get an office appointment for tomorrow  Patient states she possibly has an anal fissure that is very discomforting at this moment and really should be seen by someone in the office in order to get treatment

## 2023-07-20 ENCOUNTER — OFFICE VISIT (OUTPATIENT)
Dept: FAMILY MEDICINE CLINIC | Facility: CLINIC | Age: 38
End: 2023-07-20
Payer: COMMERCIAL

## 2023-07-20 VITALS
TEMPERATURE: 98.2 F | SYSTOLIC BLOOD PRESSURE: 122 MMHG | HEIGHT: 64 IN | WEIGHT: 144.4 LBS | BODY MASS INDEX: 24.65 KG/M2 | RESPIRATION RATE: 16 BRPM | HEART RATE: 63 BPM | DIASTOLIC BLOOD PRESSURE: 84 MMHG | OXYGEN SATURATION: 98 %

## 2023-07-20 DIAGNOSIS — Z76.89 ENCOUNTER FOR WEIGHT MANAGEMENT: Primary | ICD-10-CM

## 2023-07-20 PROBLEM — E66.3 OVERWEIGHT (BMI 25.0-29.9): Status: RESOLVED | Noted: 2022-08-06 | Resolved: 2023-07-20

## 2023-07-20 PROCEDURE — 99214 OFFICE O/P EST MOD 30 MIN: CPT | Performed by: FAMILY MEDICINE

## 2023-07-20 NOTE — ASSESSMENT & PLAN NOTE
The patient has done very well with her weight loss management. She will finish out her current prescription for the phentermine and then discontinue it. She will continue with her diet and exercise.   We will see her back in the office as scheduled for a physical.

## 2023-07-20 NOTE — PROGRESS NOTES
Assessment/Plan:  Problem List Items Addressed This Visit        Other    Encounter for weight management - Primary     The patient has done very well with her weight loss management. She will finish out her current prescription for the phentermine and then discontinue it. She will continue with her diet and exercise. We will see her back in the office as scheduled for a physical.            Return in about 3 months (around 10/20/2023) for Annual physical.   I spent 15 minutes during the visit reviewing the history from the patient, performing the examination, discussing the findings with the patient, providing counseling and education, and making a plan. I spent 15 minutes ordering referrals and testing and documenting. Subjective:   Chief Complaint   Patient presents with   • Follow-up     2 month        Patient ID: Abraham Cole is a 45 y.o. female presents today for follow-up of her weight loss. Abraham Cole is a 45 y.o. female who presents today for a follow-up of her weight loss management. She has lost 5 pounds since her last visit. She is doing well and currently her BMI is in the normal range. She has her final month remaining of the phentermine. She has been tolerating the medication well with no side effects. She is eating more protein and drinking water- she is feeling well. She is exercising. She is sleeping well. There are no problems   The patient denies any chest pain, shortness of breath, or palpitations. There is no edema. There are no headaches or visual changes. There is no lightheadedness, dizziness, or fainting spells. The patient currently denies any nausea, vomiting, or GERD symptoms. she has normal bowel movements and normal urine output. she has a normal appetite.     The following portions of the patient's history were reviewed and updated as appropriate: allergies, current medications, past family history, past medical history, past social history, past surgical history and problem list.  Patient Active Problem List   Diagnosis   • Cholelithiasis without obstruction   • Chronic constipation   • Hypothyroidism due to acquired atrophy of thyroid   • Iron deficiency anemia due to chronic blood loss   • Pulmonary nodule seen on imaging study   • Vitamin D deficiency   • PTSD (post-traumatic stress disorder)   • Medical marijuana use   • Irritable bowel syndrome with constipation   • Encounter for weight management     Past Medical History:   Diagnosis Date   • Abnormal weight gain 10/30/2017   • Cholelithiasis    • COVID 10/10/2021   • Elevated cortisol level 10/28/2021   • Eustachian tube dysfunction, bilateral 10/28/2021   • IBS (irritable bowel syndrome)    • Inflammatory bowel disease    • Iron deficiency anemia    • Menorrhagia    • Overweight (BMI 25.0-29.9) 2022   • Palpitations      Past Surgical History:   Procedure Laterality Date   • DILATION AND CURETTAGE OF UTERUS     • WISDOM TOOTH EXTRACTION       Allergies   Allergen Reactions   • Lubiprostone Nausea Only and GI Intolerance     Family History   Problem Relation Age of Onset   • Heart failure Mother    • Colon polyps Mother    • Hypothyroidism Sister      Social History     Socioeconomic History   • Marital status: /Civil Union     Spouse name: Not on file   • Number of children: 4   • Years of education: Not on file   • Highest education level: Not on file   Occupational History   • Occupation: homemaker   Tobacco Use   • Smoking status: Former     Packs/day: 1.00     Years: 5.00     Total pack years: 5.00     Types: Cigarettes     Quit date: 2018     Years since quittin.5     Passive exposure: Past   • Smokeless tobacco: Never   Vaping Use   • Vaping Use: Former   • Quit date: 2018   • Substances: Nicotine, THC   Substance and Sexual Activity   • Alcohol use: Not Currently     Comment: socially   • Drug use: Yes     Frequency: 7.0 times per week     Types: Marijuana Comment: Has medical marijuana card, last used yesterday   • Sexual activity: Yes     Partners: Male     Birth control/protection: Condom Male   Other Topics Concern   • Not on file   Social History Narrative   • Not on file     Social Determinants of Health     Financial Resource Strain: Low Risk  (2/4/2020)    Overall Financial Resource Strain (CARDIA)    • Difficulty of Paying Living Expenses: Not hard at all   Food Insecurity: No Food Insecurity (2/4/2020)    Hunger Vital Sign    • Worried About Running Out of Food in the Last Year: Never true    • Ran Out of Food in the Last Year: Never true   Transportation Needs: No Transportation Needs (2/4/2020)    PRAPARE - Transportation    • Lack of Transportation (Medical): No    • Lack of Transportation (Non-Medical):  No   Physical Activity: Sufficiently Active (6/8/2022)    Exercise Vital Sign    • Days of Exercise per Week: 3 days    • Minutes of Exercise per Session: 60 min   Stress: Stress Concern Present (6/8/2022)    [facility]    • Feeling of Stress : Rather much   Social Connections: Socially Isolated (2/4/2020)    Social Connection and Isolation Panel [NHANES]    • Frequency of Communication with Friends and Family: More than three times a week    • Frequency of Social Gatherings with Friends and Family: Twice a week    • Attends [employer] Services: Never    • Active Member of Clubs or Organizations: No    • Attends Club or Organization Meetings: Never    • Marital Status:    Intimate Partner Violence: Not At Risk (7/20/2023)    Humiliation, Afraid, Rape, and Kick questionnaire    • Fear of Current or Ex-Partner: No    • Emotionally Abused: No    • Physically Abused: No    • Sexually Abused: No   Housing Stability: Not on file     Current Outpatient Medications on File Prior to Visit   Medication Sig Dispense Refill   • Multiple Vitamins-Minerals (MULTIVITAMIN ADULT) TABS Take 1 tablet by mouth     • phentermine 15 MG capsule Take 1 capsule (15 mg total) by mouth every morning 30 capsule 2     No current facility-administered medications on file prior to visit. Review of Systems   Constitutional: Negative. HENT: Negative. Eyes: Negative. Respiratory: Negative. Cardiovascular: Negative. Gastrointestinal: Negative. Endocrine: Negative. Genitourinary: Negative. Musculoskeletal: Negative. Skin: Negative. Allergic/Immunologic: Negative. Neurological: Negative. Hematological: Negative. Psychiatric/Behavioral: Negative. Objective:  Vitals:    07/20/23 0829   BP: 122/84   BP Location: Right arm   Patient Position: Sitting   Cuff Size: Standard   Pulse: 63   Resp: 16   Temp: 98.2 °F (36.8 °C)   SpO2: 98%   Weight: 65.5 kg (144 lb 6.4 oz)   Height: 5' 4" (1.626 m)     Body mass index is 24.79 kg/m². Physical Exam  Constitutional:       Appearance: She is well-developed. HENT:      Head: Normocephalic and atraumatic. Mouth/Throat:      Pharynx: No oropharyngeal exudate. Eyes:      Conjunctiva/sclera: Conjunctivae normal.      Pupils: Pupils are equal, round, and reactive to light. Neck:      Thyroid: No thyromegaly. Vascular: No JVD. Trachea: No tracheal deviation. Cardiovascular:      Rate and Rhythm: Normal rate and regular rhythm. Heart sounds: Normal heart sounds. No murmur heard. No friction rub. No gallop. Pulmonary:      Effort: Pulmonary effort is normal. No respiratory distress. Breath sounds: Normal breath sounds. No stridor. No wheezing or rales. Chest:      Chest wall: No tenderness. Abdominal:      General: Bowel sounds are normal. There is no distension. Palpations: Abdomen is soft. There is no mass. Tenderness: There is no abdominal tenderness. There is no guarding or rebound. Musculoskeletal:         General: No tenderness or deformity. Normal range of motion.       Cervical back: Normal range of motion. Lymphadenopathy:      Cervical: No cervical adenopathy. Skin:     General: Skin is warm and dry. Neurological:      Mental Status: She is alert and oriented to person, place, and time. Cranial Nerves: No cranial nerve deficit. Motor: No abnormal muscle tone. Coordination: Coordination normal.      Deep Tendon Reflexes: Reflexes are normal and symmetric.  Reflexes normal.

## 2023-11-16 ENCOUNTER — OFFICE VISIT (OUTPATIENT)
Dept: CARDIOLOGY CLINIC | Facility: CLINIC | Age: 38
End: 2023-11-16
Payer: COMMERCIAL

## 2023-11-16 VITALS
HEIGHT: 64 IN | HEART RATE: 66 BPM | WEIGHT: 145 LBS | SYSTOLIC BLOOD PRESSURE: 114 MMHG | DIASTOLIC BLOOD PRESSURE: 72 MMHG | BODY MASS INDEX: 24.75 KG/M2

## 2023-11-16 DIAGNOSIS — R00.2 PALPITATION: Primary | ICD-10-CM

## 2023-11-16 PROCEDURE — 93000 ELECTROCARDIOGRAM COMPLETE: CPT | Performed by: INTERNAL MEDICINE

## 2023-11-16 PROCEDURE — 99244 OFF/OP CNSLTJ NEW/EST MOD 40: CPT | Performed by: INTERNAL MEDICINE

## 2023-11-16 RX ORDER — AMOXICILLIN 500 MG/1
CAPSULE ORAL
COMMUNITY
Start: 2023-11-14

## 2023-11-16 RX ORDER — SODIUM FLUORIDE 6 MG/ML
PASTE, DENTIFRICE DENTAL
COMMUNITY
Start: 2023-10-26

## 2023-11-16 NOTE — PROGRESS NOTES
[facility]  Office Consultation  Joshua Branch 45 y.o. female MRN: [id number]        Chief Complaint    Chief Complaint   Patient presents with    New Patient Visit    Palpitations     Feels like heart races at times       Referring Provider: Víctor Jones DO    Impression & Plan:    1. Palpitation    Suspect likely benign ectopy with anxiety. No sustained symptoms to suggest tachyarrhythmia. EKG today is unremarkable without evidence of preexcitation. Cardiovascular physical exam is unremarkable. She has no significant cardiac risk factors. She has no alarm symptoms. She does report longstanding anxiety. Holter monitor performed on 10/29/2018 for similar complaints was unremarkable. We will check a 24-hour Holter monitor. Assuming this is normal, no further cardiac work-up or follow-up is necessary at this time. - POCT ECG  - Holter monitor; Future        We will see Joshua Branch back as needed. HPI: Joshua Branch is a 45y.o. year old female    She has been having rushes of palpitations. She will feel a rush up her chest and pounding in her chest. She feels an increased awareness of her heart beat. She is not getting any alerts for heart rates from her Apple Watch. She notices symptoms while she is otherwise anxious, such as yesterday prior to a root canal. She previously had checked herself with a pulse oximeter and noted that her heart rates were lower than they felt. She would get readings in the 50s bpm.     She has been awoken in the middle of the night from heart pounding. She can exercise without any issues. She does fitness hooping with a weighted hoola hoop (2-5 lbs) and does Yoga. She has had 4 children without any issues during pregnancy. She quit smoking 5 years ago. Cardiac testing:     Holter 10/29/2018  IMPRESSION:  Normal 24 hour holter monitor. Patient in normal sinus rhythm throughout holter monitoring.   No premature ventricular contractions. Rare premature atrial contractions with no supraventricular tachycardia. No significant pauses. Symptoms on diary did not correlate with any dysrhythmia. EKG reviewed personally:   EKG in the office 11/16/23 - NSR, 66 bpm, normal axis, normal intervals. Normal study. Relevant Laboratory Studies:  (Laboratory studies personally reviewed)  CMP 5/16/23 -- K 4.5, Cr 0.79  Lipid panel 5/16/23 -- LDL 85 mg/dL  LDL direct 5/16/23 -- 90 mg/dL  CBC 12/12/22 - WNL      Review of Systems   Constitutional:  Negative for activity change and fatigue. HENT:  Negative for facial swelling. Eyes:  Negative for visual disturbance. Respiratory:  Negative for chest tightness and shortness of breath. Cardiovascular:  Negative for chest pain, palpitations and leg swelling. Gastrointestinal:  Negative for nausea and vomiting. Musculoskeletal:  Negative for myalgias. Skin:  Negative for pallor. Allergic/Immunologic: Negative for immunocompromised state. Neurological:  Negative for dizziness, syncope and light-headedness. Psychiatric/Behavioral:  Negative for agitation and confusion. The patient is nervous/anxious.           Past Medical History:   Diagnosis Date    Abnormal weight gain 10/30/2017    Cholelithiasis     COVID 10/10/2021    Elevated cortisol level 10/28/2021    Eustachian tube dysfunction, bilateral 10/28/2021    IBS (irritable bowel syndrome)     Inflammatory bowel disease     Iron deficiency anemia     Menorrhagia     Overweight (BMI 25.0-29.9) 08/06/2022    Palpitations      Past Surgical History:   Procedure Laterality Date    DILATION AND CURETTAGE OF UTERUS      WISDOM TOOTH EXTRACTION       Social History     Substance and Sexual Activity   Alcohol Use Not Currently    Comment: socially     Social History     Substance and Sexual Activity   Drug Use Yes    Frequency: 7.0 times per week    Types: Marijuana    Comment: Has medical marijuana card, last used yesterday Social History     Tobacco Use   Smoking Status Former    Packs/day: 1.00    Years: 5.00    Total pack years: 5.00    Types: Cigarettes    Quit date: 2018    Years since quittin.8    Passive exposure: Past   Smokeless Tobacco Never     Family History   Problem Relation Age of Onset    Heart failure Mother     Colon polyps Mother     Hypothyroidism Sister        Allergies: Allergies   Allergen Reactions    Lubiprostone Nausea Only and GI Intolerance       Medications (as of START of this encounter): Outpatient Medications Prior to Visit   Medication Sig Dispense Refill    amoxicillin (AMOXIL) 500 mg capsule TAKE 1 CAPSULE BY MOUTH EVERY 8 HOURS UNTIL GONE      Multiple Vitamins-Minerals (MULTIVITAMIN ADULT) TABS Take 1 tablet by mouth      Sodium Fluoride 5000 PPM 1.1 % PSTE BRUSH FOR 2 MINUTES AT BEDTIME AND SPIT, DO NOT RINSE. NOTHING BY MOUTH FOR 1 HOUR AFTER      phentermine 15 MG capsule Take 1 capsule (15 mg total) by mouth every morning 30 capsule 2     No facility-administered medications prior to visit. Vitals:    23 1504   BP: 114/72   Pulse: 66     Weight (last 2 days)       Date/Time Weight    23 1504 65.8 (145)            General: Markell Mann is a well appearing female, in no acute distress, sitting comfortably  HEENT: moist mucous membranes, EOMI  Neck:  No JVD, supple, trachea midline   Cardiovascular: unremarkable S1/S2, regular rate and rhythm, no murmurs, rubs or gallops   Pulmonary: normal respiratory effort, CTAB   Abdomen: soft and nondistended  Extremities: No lower extremity edema. Warm and well perfused extremities. Neuro: no focal motor deficits, AAOx3 (person, place, time)  Psych: Normal mood and affect, cooperative        Time Spent:  Total time (face-to-face and non-face-to-face) spent on today's visit was 36 minutes.  This includes preparation for the visits (i.e. reviewing test results), performance of a medically appropriate history and examination, and orders for medications, tests or other procedures. This time is exclusive of procedures performed and time spent teaching. Mena Garcia MD    This note was completed in part utilizing [employer] recognition software. Grammatical errors, random word insertion, spelling mistakes, occasional wrong word or "sound-alike" substitutions and incomplete sentences may be an occasional consequence of the system secondary to software limitations, ambient noise and hardware issues. At the time of dictation, efforts were made to edit, clarify and /or correct errors. Please read the chart carefully and recognize, using context, where substitutions have occurred. If you have any questions or concerns about the context, text or information contained within the body of this dictation, please contact myself, the provider, for further clarification.

## 2023-11-21 ENCOUNTER — HOSPITAL ENCOUNTER (OUTPATIENT)
Dept: NON INVASIVE DIAGNOSTICS | Age: 38
Discharge: HOME | End: 2023-11-21
Payer: COMMERCIAL

## 2023-11-21 DIAGNOSIS — R00.2 PALPITATION: ICD-10-CM

## 2023-11-21 PROCEDURE — 93225 XTRNL ECG REC<48 HRS REC: CPT

## 2023-11-21 PROCEDURE — 93226 XTRNL ECG REC<48 HR SCAN A/R: CPT

## 2023-11-30 ENCOUNTER — TELEPHONE (OUTPATIENT)
Dept: CARDIOLOGY CLINIC | Facility: CLINIC | Age: 38
End: 2023-11-30

## 2023-11-30 NOTE — TELEPHONE ENCOUNTER
birth [birth year] and I wore a 24 hour Holter monitor on November 21st. It was placed and returned at November 22nd and I haven't heard anything back about results and there's nothing in my chart. So I was just wondering if I should be concerned or if those results you know, were processed. So if you could give me a call back, appreciate it, [phone]. Thank you.

## 2023-12-01 ENCOUNTER — OFFICE VISIT (OUTPATIENT)
Dept: GASTROENTEROLOGY | Facility: CLINIC | Age: 38
End: 2023-12-01
Payer: COMMERCIAL

## 2023-12-01 VITALS
HEIGHT: 64 IN | DIASTOLIC BLOOD PRESSURE: 80 MMHG | SYSTOLIC BLOOD PRESSURE: 124 MMHG | BODY MASS INDEX: 24.75 KG/M2 | WEIGHT: 145 LBS

## 2023-12-01 DIAGNOSIS — K58.1 IRRITABLE BOWEL SYNDROME WITH CONSTIPATION: Primary | ICD-10-CM

## 2023-12-01 DIAGNOSIS — R10.13 EPIGASTRIC ABDOMINAL PAIN: ICD-10-CM

## 2023-12-01 DIAGNOSIS — K80.20 CALCULUS OF GALLBLADDER WITHOUT CHOLECYSTITIS WITHOUT OBSTRUCTION: ICD-10-CM

## 2023-12-01 PROCEDURE — 99214 OFFICE O/P EST MOD 30 MIN: CPT | Performed by: INTERNAL MEDICINE

## 2023-12-01 NOTE — PROGRESS NOTES
[facility] - Outpatient Follow-up Note  Nabor Owens 45 y.o. female MRN: [id number]  Encounter: [id number]    ASSESSMENT AND PLAN:    1. Irritable bowel syndrome with constipation  Assessment & Plan:  Continue conservative therapy with fiber supplementation along with regular exercise, regulation of constipation and diarrhea with as-needed over-the-counter medications. We will check Giardia, pancreatic elastase, fecal calprotectin, and stool enteric panel. Orders:  -     Giardia antigen; Future  -     Pancreatic elastase, fecal; Future  -     Calprotectin,Fecal; Future  -     Stool Enteric Bacterial Panel by PCR; Future  -     US right upper quadrant; Future; Expected date: 12/01/2023  -     Comprehensive metabolic panel; Future  -     Lipase; Future  -     Giardia antigen  -     Pancreatic elastase, fecal  -     Calprotectin,Fecal  -     Stool Enteric Bacterial Panel by PCR  -     Comprehensive metabolic panel  -     Lipase    2. Calculus of gallbladder without cholecystitis without obstruction  -     Giardia antigen; Future  -     Pancreatic elastase, fecal; Future  -     Calprotectin,Fecal; Future  -     Stool Enteric Bacterial Panel by PCR; Future  -     US right upper quadrant; Future; Expected date: 12/01/2023  -     Comprehensive metabolic panel; Future  -     Lipase; Future  -     Giardia antigen  -     Pancreatic elastase, fecal  -     Calprotectin,Fecal  -     Stool Enteric Bacterial Panel by PCR  -     Comprehensive metabolic panel  -     Lipase    History of calculus of the gallbladder with recent right upper quadrant abdominal pain. I will check liver function test along with lipase. I will also check a right upper quadrant ultrasound. The patient wanted to defer HIDA scan at this time. The patient will follow up based on testing.       I have spent 32 minutes today on the date of visit which was spent on one or more of the following: obtaining and reviewing separately obtained history, performing a medically appropriate examination and evaluation, counseling and educating the patient, ordering medications, tests, and procedures, documenting clinical information in the electronic or other health record, and care coordination. ---    Chief Complaint   Patient presents with   • Diarrhea     When pt wipes she sees yellow, pt has diarrhea, she has gallstones and has some pain URQ and radiates to back       HPI:   Georgette Harrison is a 45y.o. year old female with a past medical history of IBS-C, gallbladder stones, and anxiety who is presenting for follow-up. She was last seen in the office on 12/06/2022 by me. The patient at that time was concerned about a rectal lump and underwent a flexible sigmoidoscopy on 12/22/2022 with medium size internal hemorrhoids. Otherwise, no rectal lesions with recommendations to return at age 39 for colorectal cancer screening. She is presenting for symptoms of diarrhea and right upper quadrant abdominal discomfort that radiates to the back. HPI 12/06/2022:  26-year-old female past medical history of IBS C, gallbladder calculus, anxiety, presenting for follow-up regarding rectal lump. Was last seen in office in May of 2022 where lab work was unremarkable. This included CBC, CMP, celiac, CRP, fobt     Patient states that she has been having chronic episodes of symptoms including complete back duration, rectal pressure, pain with sex, chronic constipation and change in bowel form. She noticed recently on self rectal exam that she could have a pea-sized lump in her anal canal.  More towards the posterior section. Patient denies any itching or bleeding in her stool. Has not taking any Bentyl as she has not had any abdominal pain. Does take fiber on a daily basis.      Patient stated no reported lightheadedness, dizziness, fevers, chills, nausea, vomiting, dysphagia, heartburn, SOB, CP, abdominal pain, GI bleeding, or unintentional weight loss. HPI:    The patient reports having gallstones and mentions experiencing significant stress. She notes dental issues causing frequent visits to the dentist, including a recent root canal. She acknowledges being prescribed antibiotics for the root canal but admits not having taken them for an extended period. She expresses uncertainty about how her recent dental issues and antibiotic course may have affected her irritable bowel syndrome (IBS). Following constipation with small stools, she took magnesium citrate gummies, which alleviated the issue. She is currently experiencing morning bouts of diarrhea, with the stools appearing yellow. Notably, when wiping, she observes a yellow, greasy-looking residue. The greasy stool she experiences does not resemble olive oil floating in the toilet bowel. She is seeking a test but is reluctant to consider gallbladder removal since her symptoms do not align with traditional gallbladder attacks involving vomiting, and she has not noticed a correlation between the pain and eating. The pain has subsided since, and she denies experiencing nausea, vomiting, fevers, or chills. Following the use of magnesium citrate, she had three consecutive mornings where she had to rush to the bathroom 3 to 4 times. She had a slight episode of diarrhea this morning but attributes it to the onset of her menstrual cycle, which typically coincides with loose stools for her. She denies hematochezia. She denies any history of pancreatitis or irritation of the pancreas. She drinks alcohol occasionally. She considered going to the emergency room for test, but a previous visit indicated a diagnosis of IBS-C (irritable bowel syndrome with constipation). She has not had a parasitic examination. She believes she has been managing her symptoms quite well. With a significant improvement in constipation, eliminating the need for laxatives.  Her daily intake of a fiber supplement including greens in her diet have contributed to this positive trend. She had an ultrasound in 2019. GI procedure History: She had an ultrasound in 2019, flexible sigmoidoscopy on 2022. LAB & RADIOLOGY RESULTS:   I have personally reviewed results with the patient. Historical Information   Past Medical History:   Diagnosis Date   • Abnormal weight gain 10/30/2017   • Cholelithiasis    • COVID 10/10/2021   • Elevated cortisol level 10/28/2021   • Eustachian tube dysfunction, bilateral 10/28/2021   • IBS (irritable bowel syndrome)    • Inflammatory bowel disease    • Iron deficiency anemia    • Menorrhagia    • Overweight (BMI 25.0-29.9) 2022   • Palpitations      Past Surgical History:   Procedure Laterality Date   • DILATION AND CURETTAGE OF UTERUS     • WISDOM TOOTH EXTRACTION       Social History     Substance and Sexual Activity   Alcohol Use Yes   • Alcohol/week: 2.0 standard drinks of alcohol   • Types: 2 Standard drinks or equivalent per week    Comment: socially     Social History     Substance and Sexual Activity   Drug Use Yes   • Frequency: 7.0 times per week   • Types: Marijuana    Comment: medical marijuana patient     Social History     Tobacco Use   Smoking Status Former   • Packs/day: 1.00   • Years: 5.00   • Total pack years: 5.00   • Types: Cigarettes   • Quit date: 2018   • Years since quittin.9   • Passive exposure: Past   Smokeless Tobacco Never     Family History   Problem Relation Age of Onset   • Heart failure Mother    • Colon polyps Mother    • Hypothyroidism Sister        Meds/Allergies     Current Outpatient Medications:   •  amoxicillin (AMOXIL) 500 mg capsule  •  Multiple Vitamins-Minerals (MULTIVITAMIN ADULT) TABS  •  Sodium Fluoride 5000 PPM 1.1 % PSTE  Allergies   Allergen Reactions   • Lubiprostone Nausea Only and GI Intolerance       PHYSICAL EXAM:    Blood pressure 124/80, height 5' 4" (1.626 m), weight 65.8 kg (145 lb), not currently breastfeeding. Body mass index is 24.89 kg/m². General Appearance: No apparent distress, cooperative, alert. Eyes: Anicteric. Gastrointestinal: Positive abdominal discomfort and diarrhea. Soft, non-tender, non-distended; normal bowel sounds; no masses, no organomegaly. Rectal: Deferred. Musculoskeletal: Positive for back pain. No edema. Skin: No jaundice. OTHER LAB RESULTS:   Lab Results   Component Value Date    WBC 4.5 05/16/2023    WBC 5.39 12/12/2022    WBC 3.9 05/24/2022    HGB 12.7 05/16/2023    HGB 12.7 12/12/2022    HGB 12.4 05/24/2022    MCV 91 05/16/2023     05/16/2023     12/12/2022     05/24/2022     Lab Results   Component Value Date     09/12/2017    K 4.5 05/16/2023     05/16/2023    CO2 23 05/16/2023    BUN 9 05/16/2023    CREATININE 0.79 05/16/2023    GLUCOSE 88 09/12/2017    CALCIUM 8.9 12/12/2022    AST 18 05/16/2023    AST 19 12/12/2022    AST 15 05/24/2022    ALT 10 05/16/2023    ALT 18 12/12/2022    ALT 11 05/24/2022    ALKPHOS 50 12/12/2022    ALKPHOS 48 03/11/2022    ALKPHOS 52 01/27/2020    PROT 6.4 09/12/2017    BILITOT 0.6 09/12/2017    EGFR 98 05/16/2023     No results found for: "IRON", "TIBC", "FERRITIN"  Lab Results   Component Value Date    LIPASE 113 03/11/2022       OTHER RADIOLOGY RESULTS:   No results found. Transcribed for Marialuisa Dela Cruz MD, by Herb Nuñez on 12/02/23 at 12:57 PM. Powered by [employer].

## 2023-12-02 NOTE — ASSESSMENT & PLAN NOTE
Continue conservative therapy with fiber supplementation along with regular exercise, regulation of constipation and diarrhea with as-needed over-the-counter medications. We will check Giardia, pancreatic elastase, fecal calprotectin, and stool enteric panel.

## 2023-12-04 ENCOUNTER — PATIENT MESSAGE (OUTPATIENT)
Dept: GASTROENTEROLOGY | Facility: CLINIC | Age: 38
End: 2023-12-04

## 2023-12-04 ENCOUNTER — OFFICE VISIT (OUTPATIENT)
Dept: FAMILY MEDICINE CLINIC | Facility: CLINIC | Age: 38
End: 2023-12-04
Payer: COMMERCIAL

## 2023-12-04 VITALS
HEART RATE: 69 BPM | OXYGEN SATURATION: 99 % | DIASTOLIC BLOOD PRESSURE: 78 MMHG | SYSTOLIC BLOOD PRESSURE: 112 MMHG | WEIGHT: 141.8 LBS | TEMPERATURE: 98.3 F | BODY MASS INDEX: 24.21 KG/M2 | HEIGHT: 64 IN | RESPIRATION RATE: 16 BRPM

## 2023-12-04 DIAGNOSIS — R10.13 EPIGASTRIC ABDOMINAL PAIN: Primary | ICD-10-CM

## 2023-12-04 DIAGNOSIS — K58.0 IRRITABLE BOWEL SYNDROME WITH DIARRHEA: ICD-10-CM

## 2023-12-04 DIAGNOSIS — R10.13 EPIGASTRIC PAIN: Primary | ICD-10-CM

## 2023-12-04 DIAGNOSIS — K80.20 CALCULUS OF GALLBLADDER WITHOUT CHOLECYSTITIS WITHOUT OBSTRUCTION: ICD-10-CM

## 2023-12-04 DIAGNOSIS — F41.9 ANXIETY: ICD-10-CM

## 2023-12-04 PROCEDURE — 99213 OFFICE O/P EST LOW 20 MIN: CPT | Performed by: FAMILY MEDICINE

## 2023-12-04 RX ORDER — OMEPRAZOLE 20 MG/1
20 CAPSULE, DELAYED RELEASE ORAL DAILY
Qty: 30 CAPSULE | Refills: 1 | Status: SHIPPED | OUTPATIENT
Start: 2023-12-04

## 2023-12-04 NOTE — PROGRESS NOTES
Assessment/Plan:  Problem List Items Addressed This Visit        Digestive    Cholelithiasis without obstruction     The patient will go for the ultrasound as ordered by GI later this week. She does have a history of a gallstone but I advised her that her symptoms do not seem consistent with gallbladder attacks. We will send her for the testing as indicated and we will follow-up with her closely. We will see her back in the office as scheduled. Irritable bowel syndrome with diarrhea     The patient's symptoms likely related to irritable bowel syndrome triggered by her anxiety. She will go for the testing as ordered and we will follow-up with the results and she will also follow-up with GI as scheduled. Relevant Medications    omeprazole (PriLOSEC) 20 mg delayed release capsule    Other Relevant Orders    CBC and differential    Comprehensive metabolic panel    Amylase    Lipase       Other    Anxiety     The patient is having worsening anxiety symptoms but she is not interested in starting a medication for anxiety at this point. I did discuss with her about looking into seeing a therapist and she will consider. We will monitor her closely. Epigastric pain - Primary     The patient continues that the midepigastric pain which she has had for 3 weeks. She did already see GI and has had multiple tests ordered which she has not had performed yet. I advised her to go for the testing as ordered by GI and I ordered some additional lab work that can be done right now. In addition, we will start her on omeprazole as indicated to see if it helps with her symptoms. She will take 20 mg daily and will monitor her diet and avoid spicy and fatty foods. We will follow-up closely with the results when available.          Relevant Medications    omeprazole (PriLOSEC) 20 mg delayed release capsule    Other Relevant Orders    CBC and differential    Comprehensive metabolic panel    Amylase    Lipase Return if symptoms worsen or fail to improve. I spent 15 minutes during the visit reviewing the history from the patient, performing the examination, discussing the findings with the patient, providing counseling and education, and making a plan. I spent 15 minutes ordering referrals and testing and documenting. Subjective:   Chief Complaint   Patient presents with   • Abdominal Pain     She had gallstones for years and is looking to have tests done. Pain under rib cage and middle of back pain comes and goes. Patient ID: Suzette Antunez is a 45 y.o. female presents today for evaluation of abdominal pain for 3 weeks. Suzette Antunez is a 45 y.o. female who presents for evaluation of abdominal pain for 3 weeks. She has been under a lot of stress over the last 3 weeks with dental issues she is having. She is having pain in the right upper quadrant and it is radiating to the left and there is pain in the back. She was just in to see gastroenterology on December 1, 2023 for this same problem and they ordered multiple lab tests and an ultrasound to further evaluate her and she has not gone for the testing yet. She is supposed to be following up with GI as well and they are considering the possibility of an EGD. The pain is worse since she was seen on Friday,  There is discomfort in the bed at night and can't sit still. There is no nausea or vomiting. There is pain going to the back. There are no fevers. There is no heartburn. There is no blood in the stool or black tarry stools. There are night sweats. There are yellow  greasy stools  The pain is unrelated to eating. It is worse with fasting. There is a sour taste in her mouth. She was taking a lot of ibuprofen prior to this for her dental issues. Abdominal Pain  This is a recurrent problem. The current episode started in the past 7 days. The onset quality is undetermined.  The problem occurs 2 to 4 times per day. The most recent episode lasted 1 hours. The problem has been gradually worsening. The pain is located in the epigastric region. The pain is at a severity of 5/10. The quality of the pain is aching and burning. The abdominal pain radiates to the LUQ, RUQ, epigastric region, left flank and right flank. Associated symptoms include arthralgias, constipation, diarrhea, headaches, myalgias and nausea. Pertinent negatives include no anorexia, belching, dysuria, fever, flatus, frequency, hematochezia, hematuria, melena, vomiting or weight loss. The pain is aggravated by being still, bowel movement, certain positions and eating. The pain is relieved by Nothing. Prior diagnostic workup includes GI consult.      The following portions of the patient's history were reviewed and updated as appropriate: allergies, current medications, past family history, past medical history, past social history, past surgical history and problem list.  Patient Active Problem List   Diagnosis   • Cholelithiasis without obstruction   • Chronic constipation   • Hypothyroidism due to acquired atrophy of thyroid   • Iron deficiency anemia due to chronic blood loss   • Pulmonary nodule seen on imaging study   • Vitamin D deficiency   • PTSD (post-traumatic stress disorder)   • Medical marijuana use   • Irritable bowel syndrome with diarrhea   • Encounter for weight management   • Epigastric pain   • Anxiety     Past Medical History:   Diagnosis Date   • Abnormal weight gain 10/30/2017   • Cholelithiasis    • COVID 10/10/2021   • Elevated cortisol level 10/28/2021   • Eustachian tube dysfunction, bilateral 10/28/2021   • IBS (irritable bowel syndrome)    • Inflammatory bowel disease    • Iron deficiency anemia    • Menorrhagia    • Overweight (BMI 25.0-29.9) 08/06/2022   • Palpitations      Past Surgical History:   Procedure Laterality Date   • DILATION AND CURETTAGE OF UTERUS     • WISDOM TOOTH EXTRACTION       Allergies   Allergen Reactions   • Lubiprostone Nausea Only and GI Intolerance     Family History   Problem Relation Age of Onset   • Heart failure Mother    • Colon polyps Mother    • Hypothyroidism Sister      Social History     Socioeconomic History   • Marital status: /Civil Union     Spouse name: Not on file   • Number of children: 4   • Years of education: Not on file   • Highest education level: Not on file   Occupational History   • Occupation: homemaker   Tobacco Use   • Smoking status: Former     Packs/day: 1.00     Years: 5.00     Total pack years: 5.00     Types: Cigarettes     Quit date: 2018     Years since quittin.9     Passive exposure: Past   • Smokeless tobacco: Never   Vaping Use   • Vaping Use: Former   • Quit date: 2018   Substance and Sexual Activity   • Alcohol use: Yes     Alcohol/week: 2.0 standard drinks of alcohol     Types: 2 Standard drinks or equivalent per week     Comment: socially   • Drug use: Yes     Frequency: 7.0 times per week     Types: Marijuana     Comment: medical marijuana patient   • Sexual activity: Yes     Partners: Male     Birth control/protection: Condom Male   Other Topics Concern   • Not on file   Social History Narrative   • Not on file     Social Determinants of Health     Financial Resource Strain: Low Risk  (2020)    Overall Financial Resource Strain (CARDIA)    • Difficulty of Paying Living Expenses: Not hard at all   Food Insecurity: No Food Insecurity (2020)    Hunger Vital Sign    • Worried About Running Out of Food in the Last Year: Never true    • Ran Out of Food in the Last Year: Never true   Transportation Needs: No Transportation Needs (2020)    PRAPARE - Transportation    • Lack of Transportation (Medical): No    • Lack of Transportation (Non-Medical):  No   Physical Activity: Sufficiently Active (2022)    Exercise Vital Sign    • Days of Exercise per Week: 3 days    • Minutes of Exercise per Session: 60 min   Stress: Stress Concern Present (6/8/2022)    [address] Questionnaire    • Feeling of Stress : Rather much   Social Connections: Socially Isolated (2/4/2020)    Social Connection and Isolation Panel [NHANES]    • Frequency of Communication with Friends and Family: More than three times a week    • Frequency of Social Gatherings with Friends and Family: Twice a week    • Attends Congregation Services: Never    • Active Member of Clubs or Organizations: No    • Attends Club or Organization Meetings: Never    • Marital Status:    Intimate Partner Violence: Not At Risk (12/4/2023)    Humiliation, Afraid, Rape, and Kick questionnaire    • Fear of Current or Ex-Partner: No    • Emotionally Abused: No    • Physically Abused: No    • Sexually Abused: No   Housing Stability: Not on file     Current Outpatient Medications on File Prior to Visit   Medication Sig Dispense Refill   • Multiple Vitamins-Minerals (MULTIVITAMIN ADULT) TABS Take 1 tablet by mouth     • Sodium Fluoride 5000 PPM 1.1 % PSTE BRUSH FOR 2 MINUTES AT BEDTIME AND SPIT, DO NOT RINSE. NOTHING BY MOUTH FOR 1 HOUR AFTER     • [DISCONTINUED] amoxicillin (AMOXIL) 500 mg capsule TAKE 1 CAPSULE BY MOUTH EVERY 8 HOURS UNTIL GONE (Patient not taking: Reported on 12/4/2023)       No current facility-administered medications on file prior to visit. Review of Systems   Constitutional: Negative. Negative for fever and weight loss. HENT: Negative. Eyes: Negative. Respiratory: Negative. Cardiovascular: Negative. Gastrointestinal:  Positive for abdominal pain, constipation, diarrhea and nausea. Negative for anorexia, flatus, hematochezia, melena and vomiting. Endocrine: Negative. Genitourinary: Negative. Negative for dysuria, frequency and hematuria. Musculoskeletal:  Positive for arthralgias and myalgias. Skin: Negative. Allergic/Immunologic: Negative. Neurological:  Positive for headaches.    Hematological: Negative. Psychiatric/Behavioral: Negative. Objective:  Vitals:    12/04/23 1259   BP: 112/78   BP Location: Left arm   Patient Position: Sitting   Cuff Size: Standard   Pulse: 69   Resp: 16   Temp: 98.3 °F (36.8 °C)   TempSrc: Tympanic   SpO2: 99%   Weight: 64.3 kg (141 lb 12.8 oz)   Height: 5' 4" (1.626 m)     Body mass index is 24.34 kg/m². Physical Exam  Constitutional:       Appearance: She is well-developed. HENT:      Head: Normocephalic and atraumatic. Right Ear: Tympanic membrane, ear canal and external ear normal.      Left Ear: Tympanic membrane, ear canal and external ear normal.      Nose: Nose normal.      Mouth/Throat:      Mouth: Mucous membranes are moist.      Pharynx: No oropharyngeal exudate. Eyes:      Extraocular Movements: Extraocular movements intact. Conjunctiva/sclera: Conjunctivae normal.      Pupils: Pupils are equal, round, and reactive to light. Neck:      Thyroid: No thyromegaly. Vascular: No JVD. Trachea: No tracheal deviation. Cardiovascular:      Rate and Rhythm: Normal rate and regular rhythm. Pulses: Normal pulses. Heart sounds: Normal heart sounds. No murmur heard. No friction rub. No gallop. Pulmonary:      Effort: Pulmonary effort is normal. No respiratory distress. Breath sounds: Normal breath sounds. No stridor. No wheezing or rales. Chest:      Chest wall: No tenderness. Abdominal:      General: Bowel sounds are normal. There is no distension. Palpations: Abdomen is soft. There is no mass. Tenderness: There is abdominal tenderness in the epigastric area. There is no right CVA tenderness, left CVA tenderness, guarding or rebound. Negative signs include Hussein's sign, Rovsing's sign, McBurney's sign, psoas sign and obturator sign. Musculoskeletal:         General: No swelling, tenderness, deformity or signs of injury. Normal range of motion. Cervical back: Normal range of motion.       Right lower leg: No edema. Left lower leg: No edema. Lymphadenopathy:      Cervical: No cervical adenopathy. Skin:     General: Skin is warm and dry. Neurological:      General: No focal deficit present. Mental Status: She is alert and oriented to person, place, and time. Cranial Nerves: No cranial nerve deficit. Motor: No abnormal muscle tone. Coordination: Coordination normal.      Deep Tendon Reflexes: Reflexes are normal and symmetric. Reflexes normal.             Depression Screening and Follow-up Plan: Patient was screened for depression during today's encounter. They screened negative with a PHQ-2 score of 0.

## 2023-12-05 ENCOUNTER — TELEPHONE (OUTPATIENT)
Age: 38
End: 2023-12-05

## 2023-12-05 DIAGNOSIS — R10.13 EPIGASTRIC ABDOMINAL PAIN: Primary | ICD-10-CM

## 2023-12-05 PROBLEM — F41.9 ANXIETY: Status: ACTIVE | Noted: 2023-12-05

## 2023-12-05 NOTE — TELEPHONE ENCOUNTER
Patients GI provider:  Dr. Rendon    Number to return call: [phone]    Reason for call: Pt calling to schedule EGD. Pt wants to continue with EGD but not the biopsy. Pt states she is doing the breath test to test for h.pylori and just wants to be checked for stomach ulcer. Please call patient to schedule if still wanted.      Scheduled procedure/appointment date if applicable: N/A

## 2023-12-05 NOTE — TELEPHONE ENCOUNTER
Scheduled date of EGD(as of today): 1/22/24    Physician performing EGD: Dr. Kimberly Chan    Location of EGD: [address] [place] [address]

## 2023-12-05 NOTE — TELEPHONE ENCOUNTER
Dr Kem pratt?    I remember a mychart message from yesterday if I am remembering correctly that pt was just doing breath test?

## 2023-12-06 LAB
ALBUMIN SERPL-MCNC: 4.5 G/DL (ref 3.9–4.9)
ALBUMIN/GLOB SERPL: 2 {RATIO} (ref 1.2–2.2)
ALP SERPL-CCNC: 46 IU/L (ref 44–121)
ALT SERPL-CCNC: 12 IU/L (ref 0–32)
AMYLASE SERPL-CCNC: 68 U/L (ref 31–110)
AST SERPL-CCNC: 17 IU/L (ref 0–40)
BASOPHILS # BLD AUTO: 0.1 X10E3/UL (ref 0–0.2)
BASOPHILS NFR BLD AUTO: 1 %
BILIRUB SERPL-MCNC: 0.4 MG/DL (ref 0–1.2)
BUN SERPL-MCNC: 10 MG/DL (ref 6–20)
BUN/CREAT SERPL: 14 (ref 9–23)
CALCIUM SERPL-MCNC: 9.5 MG/DL (ref 8.7–10.2)
CHLORIDE SERPL-SCNC: 106 MMOL/L (ref 96–106)
CO2 SERPL-SCNC: 22 MMOL/L (ref 20–29)
CREAT SERPL-MCNC: 0.72 MG/DL (ref 0.57–1)
EGFR: 110 ML/MIN/1.73
EOSINOPHIL # BLD AUTO: 0.1 X10E3/UL (ref 0–0.4)
EOSINOPHIL NFR BLD AUTO: 2 %
ERYTHROCYTE [DISTWIDTH] IN BLOOD BY AUTOMATED COUNT: 13.8 % (ref 11.7–15.4)
GLOBULIN SER-MCNC: 2.2 G/DL (ref 1.5–4.5)
GLUCOSE SERPL-MCNC: 88 MG/DL (ref 70–99)
HCT VFR BLD AUTO: 36.9 % (ref 34–46.6)
HGB BLD-MCNC: 12.6 G/DL (ref 11.1–15.9)
IMM GRANULOCYTES # BLD: 0 X10E3/UL (ref 0–0.1)
IMM GRANULOCYTES NFR BLD: 0 %
LIPASE SERPL-CCNC: 34 U/L (ref 14–72)
LYMPHOCYTES # BLD AUTO: 1.2 X10E3/UL (ref 0.7–3.1)
LYMPHOCYTES NFR BLD AUTO: 32 %
MCH RBC QN AUTO: 30.4 PG (ref 26.6–33)
MCHC RBC AUTO-ENTMCNC: 34.1 G/DL (ref 31.5–35.7)
MCV RBC AUTO: 89 FL (ref 79–97)
MONOCYTES # BLD AUTO: 0.3 X10E3/UL (ref 0.1–0.9)
MONOCYTES NFR BLD AUTO: 8 %
NEUTROPHILS # BLD AUTO: 2.2 X10E3/UL (ref 1.4–7)
NEUTROPHILS NFR BLD AUTO: 57 %
PLATELET # BLD AUTO: 301 X10E3/UL (ref 150–450)
POTASSIUM SERPL-SCNC: 4.5 MMOL/L (ref 3.5–5.2)
PROT SERPL-MCNC: 6.7 G/DL (ref 6–8.5)
RBC # BLD AUTO: 4.15 X10E6/UL (ref 3.77–5.28)
SODIUM SERPL-SCNC: 141 MMOL/L (ref 134–144)
WBC # BLD AUTO: 3.8 X10E3/UL (ref 3.4–10.8)

## 2023-12-06 NOTE — ASSESSMENT & PLAN NOTE
The patient's symptoms likely related to irritable bowel syndrome triggered by her anxiety. She will go for the testing as ordered and we will follow-up with the results and she will also follow-up with GI as scheduled.

## 2023-12-06 NOTE — ASSESSMENT & PLAN NOTE
The patient is having worsening anxiety symptoms but she is not interested in starting a medication for anxiety at this point. I did discuss with her about looking into seeing a therapist and she will consider. We will monitor her closely.

## 2023-12-06 NOTE — ASSESSMENT & PLAN NOTE
The patient will go for the ultrasound as ordered by GI later this week. She does have a history of a gallstone but I advised her that her symptoms do not seem consistent with gallbladder attacks. We will send her for the testing as indicated and we will follow-up with her closely. We will see her back in the office as scheduled.

## 2023-12-07 ENCOUNTER — HOSPITAL ENCOUNTER (OUTPATIENT)
Dept: ULTRASOUND IMAGING | Facility: CLINIC | Age: 38
Discharge: HOME | End: 2023-12-07
Payer: COMMERCIAL

## 2023-12-07 DIAGNOSIS — K80.20 CALCULUS OF GALLBLADDER WITHOUT CHOLECYSTITIS WITHOUT OBSTRUCTION: ICD-10-CM

## 2023-12-07 DIAGNOSIS — K58.1 IRRITABLE BOWEL SYNDROME WITH CONSTIPATION: ICD-10-CM

## 2023-12-07 LAB — H PYLORI AG STL QL IA: NEGATIVE

## 2023-12-07 PROCEDURE — 76705 ECHO EXAM OF ABDOMEN: CPT

## 2023-12-11 ENCOUNTER — PATIENT MESSAGE (OUTPATIENT)
Dept: GASTROENTEROLOGY | Facility: CLINIC | Age: 38
End: 2023-12-11

## 2023-12-11 DIAGNOSIS — R10.13 EPIGASTRIC ABDOMINAL PAIN: ICD-10-CM

## 2023-12-11 DIAGNOSIS — K80.20 CALCULUS OF GALLBLADDER WITHOUT CHOLECYSTITIS WITHOUT OBSTRUCTION: ICD-10-CM

## 2023-12-11 DIAGNOSIS — K86.89 PANCREATIC INSUFFICIENCY: ICD-10-CM

## 2023-12-11 DIAGNOSIS — R19.4 CHANGE IN BOWEL HABIT: Primary | ICD-10-CM

## 2023-12-11 LAB
ADV 40+41 DNA STL QL NAA+NON-PROBE: NOT DETECTED
ALBUMIN SERPL-MCNC: 4.4 G/DL (ref 3.9–4.9)
ALBUMIN/GLOB SERPL: 1.9 {RATIO} (ref 1.2–2.2)
ALP SERPL-CCNC: 47 IU/L (ref 44–121)
ALT SERPL-CCNC: 12 IU/L (ref 0–32)
AST SERPL-CCNC: 16 IU/L (ref 0–40)
ASTRO TYP 1-8 RNA STL QL NAA+NON-PROBE: NOT DETECTED
BILIRUB SERPL-MCNC: 0.4 MG/DL (ref 0–1.2)
BUN SERPL-MCNC: 10 MG/DL (ref 6–20)
BUN/CREAT SERPL: 13 (ref 9–23)
C CAYETANENSIS DNA STL QL NAA+NON-PROBE: NOT DETECTED
C COLI+JEJ+UPSA DNA STL QL NAA+NON-PROBE: NOT DETECTED
C DIF TOX TCDA+TCDB STL QL NAA+NON-PROBE: NOT DETECTED
CALCIUM SERPL-MCNC: 9.4 MG/DL (ref 8.7–10.2)
CALPROTECTIN STL-MCNT: 28 UG/G (ref 0–120)
CHLORIDE SERPL-SCNC: 106 MMOL/L (ref 96–106)
CO2 SERPL-SCNC: 22 MMOL/L (ref 20–29)
CREAT SERPL-MCNC: 0.78 MG/DL (ref 0.57–1)
CRYPTOSP DNA STL QL NAA+NON-PROBE: NOT DETECTED
E COLI O157 DNA STL QL NAA+NON-PROBE: NORMAL
E HISTOLYT DNA STL QL NAA+NON-PROBE: NOT DETECTED
EAEC PAA PLAS AGGR+AATA ST NAA+NON-PRB: NOT DETECTED
EC STX1+STX2 GENES STL QL NAA+NON-PROBE: NOT DETECTED
EGFR: 100 ML/MIN/1.73
ELASTASE PANC STL-MCNT: 169 UG ELAST./G
EPEC EAE GENE STL QL NAA+NON-PROBE: NOT DETECTED
ETEC LTA+ST1A+ST1B TOX ST NAA+NON-PROBE: NOT DETECTED
G LAMBLIA AG STL QL IA: NEGATIVE
G LAMBLIA DNA STL QL NAA+NON-PROBE: NOT DETECTED
GLOBULIN SER-MCNC: 2.3 G/DL (ref 1.5–4.5)
GLUCOSE SERPL-MCNC: 89 MG/DL (ref 70–99)
LIPASE SERPL-CCNC: 33 U/L (ref 14–72)
NOROVIRUS GI+II RNA STL QL NAA+NON-PROBE: NOT DETECTED
P SHIGELLOIDES DNA STL QL NAA+NON-PROBE: NOT DETECTED
POTASSIUM SERPL-SCNC: 4.4 MMOL/L (ref 3.5–5.2)
PROT SERPL-MCNC: 6.7 G/DL (ref 6–8.5)
RVA RNA STL QL NAA+NON-PROBE: NOT DETECTED
S ENT+BONG DNA STL QL NAA+NON-PROBE: NOT DETECTED
SAPO I+II+IV+V RNA STL QL NAA+NON-PROBE: NOT DETECTED
SHIGELLA SP+EIEC IPAH ST NAA+NON-PROBE: NOT DETECTED
SODIUM SERPL-SCNC: 142 MMOL/L (ref 134–144)
V CHOL+PARA+VUL DNA STL QL NAA+NON-PROBE: NOT DETECTED
V CHOLERAE DNA STL QL NAA+NON-PROBE: NOT DETECTED
Y ENTEROCOL DNA STL QL NAA+NON-PROBE: NOT DETECTED

## 2023-12-21 ENCOUNTER — PATIENT MESSAGE (OUTPATIENT)
Dept: GASTROENTEROLOGY | Facility: CLINIC | Age: 38
End: 2023-12-21

## 2023-12-21 DIAGNOSIS — K86.89 PANCREATIC INSUFFICIENCY: Primary | ICD-10-CM

## 2023-12-21 LAB — ELASTASE PANC STL-MCNT: 114 UG ELAST./G

## 2023-12-22 DIAGNOSIS — E55.9 VITAMIN D DEFICIENCY: ICD-10-CM

## 2023-12-22 DIAGNOSIS — D50.0 IRON DEFICIENCY ANEMIA DUE TO CHRONIC BLOOD LOSS: ICD-10-CM

## 2023-12-22 DIAGNOSIS — K90.9 INTESTINAL MALABSORPTION, UNSPECIFIED TYPE: ICD-10-CM

## 2023-12-22 DIAGNOSIS — K58.0 IRRITABLE BOWEL SYNDROME WITH DIARRHEA: Primary | ICD-10-CM

## 2024-01-01 LAB
25(OH)D3+25(OH)D2 SERPL-MCNC: 40.9 NG/ML (ref 30–100)
A-TOCOPHEROL VIT E SERPL-MCNC: 10.6 MG/L (ref 5.9–19.4)
ALBUMIN SERPL-MCNC: 4.3 G/DL (ref 3.9–4.9)
ALBUMIN/GLOB SERPL: 2.2 {RATIO} (ref 1.2–2.2)
ALP SERPL-CCNC: 53 IU/L (ref 44–121)
ALT SERPL-CCNC: 16 IU/L (ref 0–32)
AST SERPL-CCNC: 19 IU/L (ref 0–40)
BASOPHILS # BLD AUTO: 0.1 X10E3/UL (ref 0–0.2)
BASOPHILS NFR BLD AUTO: 2 %
BILIRUB SERPL-MCNC: 0.5 MG/DL (ref 0–1.2)
BUN SERPL-MCNC: 10 MG/DL (ref 6–20)
BUN/CREAT SERPL: 14 (ref 9–23)
CALCIUM SERPL-MCNC: 9.3 MG/DL (ref 8.7–10.2)
CHLORIDE SERPL-SCNC: 104 MMOL/L (ref 96–106)
CO2 SERPL-SCNC: 21 MMOL/L (ref 20–29)
CREAT SERPL-MCNC: 0.71 MG/DL (ref 0.57–1)
EGFR: 112 ML/MIN/1.73
EOSINOPHIL # BLD AUTO: 0.1 X10E3/UL (ref 0–0.4)
EOSINOPHIL NFR BLD AUTO: 2 %
ERYTHROCYTE [DISTWIDTH] IN BLOOD BY AUTOMATED COUNT: 13.7 % (ref 11.7–15.4)
EST. AVERAGE GLUCOSE BLD GHB EST-MCNC: 105 MG/DL
FERRITIN SERPL-MCNC: 9 NG/ML (ref 15–150)
FOLATE SERPL-MCNC: 18 NG/ML
GAMMA TOCOPHEROL SERPL-MCNC: 0.8 MG/L (ref 0.7–4.9)
GLOBULIN SER-MCNC: 2 G/DL (ref 1.5–4.5)
GLUCOSE SERPL-MCNC: 94 MG/DL (ref 70–99)
HBA1C MFR BLD: 5.3 % (ref 4.8–5.6)
HCT VFR BLD AUTO: 36.7 % (ref 34–46.6)
HGB BLD-MCNC: 13.3 G/DL (ref 11.1–15.9)
IMM GRANULOCYTES # BLD: 0 X10E3/UL (ref 0–0.1)
IMM GRANULOCYTES NFR BLD: 1 %
LYMPHOCYTES # BLD AUTO: 1.6 X10E3/UL (ref 0.7–3.1)
LYMPHOCYTES NFR BLD AUTO: 38 %
MCH RBC QN AUTO: 32.5 PG (ref 26.6–33)
MCHC RBC AUTO-ENTMCNC: 36.2 G/DL (ref 31.5–35.7)
MCV RBC AUTO: 90 FL (ref 79–97)
MONOCYTES # BLD AUTO: 0.4 X10E3/UL (ref 0.1–0.9)
MONOCYTES NFR BLD AUTO: 8 %
NEUTROPHILS # BLD AUTO: 2.1 X10E3/UL (ref 1.4–7)
NEUTROPHILS NFR BLD AUTO: 49 %
PLATELET # BLD AUTO: 250 X10E3/UL (ref 150–450)
POTASSIUM SERPL-SCNC: 4.4 MMOL/L (ref 3.5–5.2)
PROT SERPL-MCNC: 6.3 G/DL (ref 6–8.5)
RBC # BLD AUTO: 4.09 X10E6/UL (ref 3.77–5.28)
SODIUM SERPL-SCNC: 138 MMOL/L (ref 134–144)
VIT A SERPL-MCNC: 31.9 UG/DL (ref 18.9–57.3)
VIT B1 BLD-SCNC: 85.8 NMOL/L (ref 66.5–200)
VIT B12 SERPL-MCNC: >2000 PG/ML (ref 232–1245)
VIT B2 BLD-MCNC: 201 UG/L (ref 137–370)
WBC # BLD AUTO: 4.3 X10E3/UL (ref 3.4–10.8)

## 2024-01-02 ENCOUNTER — PATIENT MESSAGE (OUTPATIENT)
Dept: FAMILY MEDICINE CLINIC | Facility: CLINIC | Age: 39
End: 2024-01-02

## 2024-01-02 DIAGNOSIS — K86.81 EXOCRINE PANCREATIC INSUFFICIENCY: ICD-10-CM

## 2024-01-02 DIAGNOSIS — R10.13 EPIGASTRIC PAIN: ICD-10-CM

## 2024-01-02 DIAGNOSIS — K90.9 INTESTINAL MALABSORPTION, UNSPECIFIED TYPE: Primary | ICD-10-CM

## 2024-01-02 DIAGNOSIS — K58.0 IRRITABLE BOWEL SYNDROME WITH DIARRHEA: ICD-10-CM

## 2024-01-02 DIAGNOSIS — E56.9 MULTIPLE VITAMIN DEFICIENCY: ICD-10-CM

## 2024-01-03 ENCOUNTER — TELEPHONE (OUTPATIENT)
Dept: SURGERY | Facility: CLINIC | Age: 39
End: 2024-01-03

## 2024-01-03 ENCOUNTER — OFFICE VISIT (OUTPATIENT)
Dept: GASTROENTEROLOGY | Facility: CLINIC | Age: 39
End: 2024-01-03
Payer: COMMERCIAL

## 2024-01-03 VITALS
SYSTOLIC BLOOD PRESSURE: 107 MMHG | WEIGHT: 149 LBS | BODY MASS INDEX: 25.44 KG/M2 | DIASTOLIC BLOOD PRESSURE: 60 MMHG | HEIGHT: 64 IN

## 2024-01-03 DIAGNOSIS — E61.1 IRON DEFICIENCY: ICD-10-CM

## 2024-01-03 DIAGNOSIS — R10.13 EPIGASTRIC ABDOMINAL PAIN: ICD-10-CM

## 2024-01-03 DIAGNOSIS — F41.9 ANXIETY: ICD-10-CM

## 2024-01-03 DIAGNOSIS — R19.4 CHANGE IN BOWEL HABIT: ICD-10-CM

## 2024-01-03 DIAGNOSIS — K86.89 PANCREATIC INSUFFICIENCY: Primary | ICD-10-CM

## 2024-01-03 DIAGNOSIS — K80.20 CALCULUS OF GALLBLADDER WITHOUT CHOLECYSTITIS WITHOUT OBSTRUCTION: ICD-10-CM

## 2024-01-03 PROCEDURE — 99214 OFFICE O/P EST MOD 30 MIN: CPT | Performed by: INTERNAL MEDICINE

## 2024-01-03 RX ORDER — FERROUS SULFATE 324(65)MG
324 TABLET, DELAYED RELEASE (ENTERIC COATED) ORAL EVERY OTHER DAY
Qty: 45 TABLET | Refills: 1 | Status: SHIPPED | OUTPATIENT
Start: 2024-01-03

## 2024-01-03 NOTE — PROGRESS NOTES
Breath test  [facility] - Outpatient Follow-up Note  Damir Shay 38 y.o. female MRN: [id number]  Encounter: [id number]    ASSESSMENT AND PLAN:    1. Pancreatic insufficiency  -     Gastrin; Future  -     Alpha 1 Antitrypsin Phenotype; Future  -     Breath Test  -     ferrous sulfate 324 (65 Fe) mg; Take 1 tablet (324 mg total) by mouth every other day  -     Cystic Fibrosis (CF) Profile, DNA Analysis and 5T Allele Genotyping; Future  -     Gastrin  -     Alpha 1 Antitrypsin Phenotype  -     Cystic Fibrosis (CF) Profile, DNA Analysis and 5T Allele Genotyping    2. Change in bowel habit  -     Breath Test    3. Epigastric abdominal pain    4. Calculus of gallbladder without cholecystitis without obstruction    5. Anxiety    6. Iron deficiency    1. Pancreatic insufficiency.  Will reach out to the advanced providers to see if she is a viable candidate for the endoscopic ultrasound. Will hold off on the MRI. She is adamant about not taking the contrast gadolinium.    -Start Creon with meals and snacks  -Workup of causes including: CT imaging of the pancreas, testing for cystic fibrosis, gastrinoma, liver disease, A1AT deficiency, undernutrition.  -Unlikely hemochromatosis as she has a history of iron deficiency anemia.    2.  Iron deficiency  Patient noted recently to have a ferritin of 9.  Hemoglobin at 13.  Will order ferrous sulfate every other day Will order some iron supplements. Recommended to take iron supplement every other day to take for at least 1 month.  Recheck in the future ferritin level.  If normalized can stop.      I have spent 33 minutes today on the date of visit which was spent on one or more of the following: obtaining and reviewing separately obtained history, performing a medically appropriate examination and evaluation, counseling and educating the patient, ordering medications, tests, and procedures, documenting clinical information in the electronic  "or other health record, and care coordination.      Chief Complaint   Patient presents with   • Follow up epi and lab work         HPI:   Damir Shay is a 38 y.o. year old female with past medical history of IBS, gallbladder stones, anxiety, pancreatic insufficiency who is presenting for follow-up. Most recently in 12/2023, she had a pancreatic elastase of 114 mcg/g that was confirmed on repeat testing. Moderate pancreatic insufficiency noted. Trial of Creon was recommended and MRI with MRCP of the abdomen was ordered and is pending.    The patient is experiencing irregular bowel movements. Despite the initial relief provided by over-the-counter enzymes, her symptoms have reverted to their initial state when she first sought gastroenterological consultation. She is currently suffering from constipation, with stools that are \"sticky\", \"hard to get out\", challenging to clean after, and hard to flush. The frequency of her bowel movements has decreased, transitioning from a state of diarrhea to increased constipation.     Her initial pain was burning. Her ultrasound showed that she still has the gallstone. She consistently declines the use of contrast agents for diagnostic procedures.     She is scheduled for an upper endoscopy. She is contemplating whether to proceed with the planned EGD or to opt for an endoscopic ultrasound. She is inquiring about the prevalence of Small Intestinal Bacterial Overgrowth and its potential role as a causative factor for her symptoms. She has not undergone testing for SIBO.     Her family's primary care physician had requested a series of vitamin assays. The results indicated that her vitamin B12 levels were significantly elevated, a condition that may be attributed to her current supplementation regimen. Concurrently, her ferritin levels were found to be below the normal range, suggesting a potential iron deficiency.       LAB & RADIOLOGY RESULTS:   Most recently in 12/2023, she " "had a pancreatic elastase of 114 mcg/g that was confirmed on repeat testing. Moderate pancreatic insufficiency noted.    Historical Information   Past Medical History:   Diagnosis Date   • Abnormal weight gain 10/30/2017   • Cholelithiasis    • COVID 10/10/2021   • Elevated cortisol level 10/28/2021   • Eustachian tube dysfunction, bilateral 10/28/2021   • IBS (irritable bowel syndrome)    • Inflammatory bowel disease    • Iron deficiency anemia    • Menorrhagia    • Overweight (BMI 25.0-29.9) 2022   • Palpitations      Past Surgical History:   Procedure Laterality Date   • DILATION AND CURETTAGE OF UTERUS     • WISDOM TOOTH EXTRACTION       Social History     Substance and Sexual Activity   Alcohol Use Yes   • Alcohol/week: 2.0 standard drinks of alcohol   • Types: 2 Standard drinks or equivalent per week    Comment: socially     Social History     Substance and Sexual Activity   Drug Use Yes   • Frequency: 7.0 times per week   • Types: Marijuana    Comment: medical marijuana patient     Social History     Tobacco Use   Smoking Status Former   • Current packs/day: 0.00   • Average packs/day: 1 pack/day for 5.0 years (5.0 ttl pk-yrs)   • Types: Cigarettes   • Start date: 2013   • Quit date: 2018   • Years since quittin.0   • Passive exposure: Past   Smokeless Tobacco Never     Family History   Problem Relation Age of Onset   • Heart failure Mother    • Colon polyps Mother    • Hypothyroidism Sister        Meds/Allergies     Current Outpatient Medications:   •  ferrous sulfate 324 (65 Fe) mg  •  Multiple Vitamins-Minerals (MULTIVITAMIN ADULT) TABS  •  omeprazole (PriLOSEC) 20 mg delayed release capsule  •  pancrelipase, Lip-Prot-Amyl, (CREON) 24,000 units  •  Sodium Fluoride 5000 PPM 1.1 % PSTE  Allergies   Allergen Reactions   • Lubiprostone Nausea Only and GI Intolerance       PHYSICAL EXAM:    Blood pressure 107/60, height 5' 4\" (1.626 m), weight 67.6 kg (149 lb), not currently breastfeeding. " Body mass index is 25.58 kg/m².      OTHER LAB RESULTS:   Lab Results   Component Value Date    WBC 4.3 12/27/2023    WBC 3.8 12/05/2023    WBC 4.5 05/16/2023    HGB 13.3 12/27/2023    HGB 12.6 12/05/2023    HGB 12.7 05/16/2023    MCV 90 12/27/2023     12/27/2023     12/05/2023     05/16/2023     Lab Results   Component Value Date     09/12/2017    K 4.4 12/27/2023     12/27/2023    CO2 21 12/27/2023    BUN 10 12/27/2023    CREATININE 0.71 12/27/2023    GLUCOSE 88 09/12/2017    CALCIUM 8.9 12/12/2022    AST 19 12/27/2023    AST 17 12/05/2023    AST 16 12/05/2023    ALT 16 12/27/2023    ALT 12 12/05/2023    ALT 12 12/05/2023    ALKPHOS 50 12/12/2022    ALKPHOS 48 03/11/2022    ALKPHOS 52 01/27/2020    PROT 6.4 09/12/2017    BILITOT 0.6 09/12/2017    EGFR 112 12/27/2023     Lab Results   Component Value Date    FERRITIN 9 (L) 12/27/2023     Lab Results   Component Value Date    LIPASE 34 12/05/2023       OTHER RADIOLOGY RESULTS:   US right upper quadrant    Result Date: 12/12/2023  Narrative: RIGHT UPPER QUADRANT ULTRASOUND INDICATION:     K58.1: Irritable bowel syndrome with constipation K80.20: Calculus of gallbladder without cholecystitis without obstruction. COMPARISON: Abdominal ultrasound June 25, 2019. Correlation with CT abdomen/pelvis March 11, 2022 TECHNIQUE:   Real-time ultrasound of the right upper quadrant was performed with a curvilinear transducer with both volumetric sweeps and still imaging techniques. FINDINGS: PANCREAS:  Visualized portions of the pancreas are within normal limits. AORTA AND IVC:  Visualized portions are normal for patient age. LIVER: Size:  Within normal range.  The liver measures 12.5 cm in the midclavicular line. Contour:  Surface contour is smooth. Parenchyma:  Echogenicity and echotexture are within normal limits. No liver mass identified. Limited imaging of the main portal vein shows it to be patent and hepatopetal. BILIARY: The gallbladder is  normal in caliber. No wall thickening or pericholecystic fluid. Shadowing gallstone(s) identified. No sonographic Hussein's sign. No intrahepatic biliary dilatation. CBD measures 2.0 mm. No choledocholithiasis. KIDNEY: Right kidney measures 11.1 x 4.5 x 5.6 cm. Volume 146.6 mL Kidney within normal limits. ASCITES:   None.     Impression: Cholelithiasis without acute cholecystitis. Workstation performed: [id number]       Transcribed for Jovani Wallace MD, by Rianna Blair on 01/03/24 at 2:01 PM. Powered by Dragon [employer].

## 2024-01-03 NOTE — TELEPHONE ENCOUNTER
Reached out to patient re; ryder LUGO ordered more testing first. She will call back to reschedule once testing is completed.

## 2024-01-05 ENCOUNTER — TELEPHONE (OUTPATIENT)
Age: 39
End: 2024-01-05

## 2024-01-05 NOTE — TELEPHONE ENCOUNTER
Pt called back. I called and spoke to Alexa in [facility] office. Alexa labeled a SIBO test with patients name and will hold it at the  for pt to . Pt was advised and will  on Monday. Address was provided. No other questions.

## 2024-01-06 LAB — GASTRIN SERPL-MCNC: 19 PG/ML (ref 0–115)

## 2024-01-08 ENCOUNTER — ANESTHESIA (OUTPATIENT)
Dept: ANESTHESIOLOGY | Facility: AMBULATORY SURGERY CENTER | Age: 39
End: 2024-01-08

## 2024-01-08 ENCOUNTER — ANESTHESIA EVENT (OUTPATIENT)
Dept: ANESTHESIOLOGY | Facility: AMBULATORY SURGERY CENTER | Age: 39
End: 2024-01-08

## 2024-01-11 ENCOUNTER — PATIENT MESSAGE (OUTPATIENT)
Dept: GASTROENTEROLOGY | Facility: CLINIC | Age: 39
End: 2024-01-11

## 2024-01-11 ENCOUNTER — TELEPHONE (OUTPATIENT)
Dept: GASTROENTEROLOGY | Facility: CLINIC | Age: 39
End: 2024-01-11

## 2024-01-11 DIAGNOSIS — K58.1 IRRITABLE BOWEL SYNDROME WITH CONSTIPATION: Primary | ICD-10-CM

## 2024-01-11 NOTE — TELEPHONE ENCOUNTER
Spoke with patient, reviewed provider recommendations. Patient verbalized understanding, no further questions. Patient is agreeable to scheduling procedure.

## 2024-01-11 NOTE — TELEPHONE ENCOUNTER
----- Message from Brian Roberts MD sent at 1/5/2024  5:55 PM EST -----  Regarding: RE: Request for EUS  Sure. Would be happy to. Any reason she does not want the contrast study? Brian    ----- Message -----  From: iY Lima RN  Sent: 1/5/2024   2:21 PM EST  To: Brian Roberts MD; Jovani Wallace MD; #  Subject: RE: Request for EUS                              Hi Dr. Roberts,  Please see below and advise.  Thank you!  Yi  ----- Message -----  From: Jovani Wallace MD  Sent: 1/3/2024   9:52 AM EST  To: [facility]  Subject: Request for EUS                                  Hello, I have this patient who was just recently diagnosed with pancreatic insufficiency.  Confirmed on repeat testing.  She really prefers not to have any contrast media and abdominal imaging with CT or MRI and we are wondering if it was possible to get an EUS to evaluate her pancreas and also her CBD to see if there is any stones.  She does have a history of gallstones.  Would also recommend an EGD at the same time prior to rule out any ulcerative disease or erosive disease.  Please let me know if you have any questions.  Thank you

## 2024-01-12 ENCOUNTER — TELEPHONE (OUTPATIENT)
Age: 39
End: 2024-01-12

## 2024-01-12 NOTE — TELEPHONE ENCOUNTER
Procedure:   EUS  Scheduled date of procedure (as of today): 3/21/24  Physician performing procedure: Dr. Roberts  Location of procedure: [place]   Instructions reviewed with patient by:   Jazz - mailed   Clearances:  n/a

## 2024-01-12 NOTE — TELEPHONE ENCOUNTER
Patients GI provider:  Dr. KAM    Number to return call: [phone]     Reason for call: Pt calling to schedule EUS.    Please is requesting call back ASAP to arrange.    Scheduled procedure/appointment date if applicable: 1/22/2024 EGD

## 2024-01-19 RX ORDER — LINACLOTIDE 72 UG/1
1 CAPSULE, GELATIN COATED ORAL
Qty: 30 CAPSULE | Refills: 0 | Status: SHIPPED | OUTPATIENT
Start: 2024-01-19

## 2024-01-22 ENCOUNTER — TELEPHONE (OUTPATIENT)
Age: 39
End: 2024-01-22

## 2024-01-22 NOTE — TELEPHONE ENCOUNTER
PA for LINZESS Approved   Date(s) approved UNTIL 01/22/2025      Patient advised by [x] [employer] Message                      [] Phone call       Pharmacy advised by [x]Fax                                     []Phone call    Approval letter scanned into Media Yes

## 2024-01-22 NOTE — TELEPHONE ENCOUNTER
PA for LINZESS 72G    Submitted via  [x]CMM-KEY  [id number]       Office notes sent, clinical questions answered. Awaiting determination

## 2024-02-02 LAB
ARACHIDONIC ACID/EPA RATIO: 21.9 (ref 3.7–40.7)
ARACHIDONIC ACID: 9.2 % BY WT (ref 8.6–15.6)
ARSENIC BLD-MCNC: 2 UG/L (ref 0–9)
CA-I SERPL ISE-MCNC: 5 MG/DL (ref 4.5–5.6)
CADMIUM BLD-MCNC: 0.7 UG/L (ref 0–1.2)
CALCIUM SERPL-MCNC: 9 MG/DL (ref 8.7–10.2)
COPPER SERPL-MCNC: 105 UG/DL (ref 80–158)
DHA: 1.3 % BY WT (ref 1.4–5.1)
DPA: 1 % BY WT (ref 0.8–1.8)
EPA: 0.4 % BY WT (ref 0.2–2.3)
IODINE SERPL-MCNC: 41.3 UG/L (ref 40–92)
LEAD BLD-MCNC: <1 UG/DL (ref 0–3.4)
LINOLEIC ACID: 28.3 % BY WT (ref 18.6–29.5)
MAGNESIUM SERPL-MCNC: 1.9 MG/DL (ref 1.6–2.3)
MERCURY BLD-MCNC: <1 UG/L (ref 0–14.9)
OMEGA-6 TOTAL: 40.4 % BY WT
OMEGACHECK(TM): 2.7 % BY WT
SELENIUM SERPL-MCNC: 126 UG/L (ref 93–198)
W3 FA SERPL-SCNC: 2.7 % BY WT
W6 FA/W3 FA SERPL-SRTO: 14.9 (ref 3.7–14.4)

## 2024-02-07 DIAGNOSIS — R61 UNEXPLAINED NIGHT SWEATS: ICD-10-CM

## 2024-02-07 DIAGNOSIS — R63.5 WEIGHT GAIN: Primary | ICD-10-CM

## 2024-02-07 DIAGNOSIS — R35.1 FREQUENT URINATION AT NIGHT: ICD-10-CM

## 2024-02-13 DIAGNOSIS — K86.89 PANCREATIC INSUFFICIENCY: Primary | ICD-10-CM

## 2024-02-15 ENCOUNTER — OFFICE VISIT (OUTPATIENT)
Dept: ENDOCRINOLOGY | Facility: HOSPITAL | Age: 39
End: 2024-02-15
Payer: COMMERCIAL

## 2024-02-15 VITALS
HEART RATE: 67 BPM | SYSTOLIC BLOOD PRESSURE: 122 MMHG | HEIGHT: 64 IN | OXYGEN SATURATION: 99 % | DIASTOLIC BLOOD PRESSURE: 72 MMHG | BODY MASS INDEX: 25.95 KG/M2 | WEIGHT: 152 LBS

## 2024-02-15 DIAGNOSIS — R35.1 FREQUENT URINATION AT NIGHT: ICD-10-CM

## 2024-02-15 DIAGNOSIS — R63.5 WEIGHT GAIN: ICD-10-CM

## 2024-02-15 DIAGNOSIS — R61 UNEXPLAINED NIGHT SWEATS: ICD-10-CM

## 2024-02-15 DIAGNOSIS — R35.89 POLYURIA: Primary | ICD-10-CM

## 2024-02-15 PROCEDURE — 99244 OFF/OP CNSLTJ NEW/EST MOD 40: CPT | Performed by: STUDENT IN AN ORGANIZED HEALTH CARE EDUCATION/TRAINING PROGRAM

## 2024-02-15 NOTE — PROGRESS NOTES
2/15/2024    Assessment/Plan        Problem List Items Addressed This Visit    None  Visit Diagnoses       Polyuria    -  Primary    Relevant Orders    Comprehensive metabolic panel    TSH, 3rd generation    T4, free    SALIVARY CORTISOL,ONE SPECIMEN    Osmolality, urine    Osmolality-If this is regarding a toxic alcohol, STOP. Test is not routinely indicated. Please consult medical  on call for further guidance.    Weight gain        Relevant Orders    Comprehensive metabolic panel    TSH, 3rd generation    T4, free    SALIVARY CORTISOL,ONE SPECIMEN    Osmolality, urine    Osmolality-If this is regarding a toxic alcohol, STOP. Test is not routinely indicated. Please consult medical  on call for further guidance.    Frequent urination at night        Relevant Orders    Comprehensive metabolic panel    TSH, 3rd generation    T4, free    SALIVARY CORTISOL,ONE SPECIMEN    Osmolality, urine    Osmolality-If this is regarding a toxic alcohol, STOP. Test is not routinely indicated. Please consult medical  on call for further guidance.    Unexplained night sweats        Relevant Orders    Comprehensive metabolic panel    TSH, 3rd generation    T4, free    SALIVARY CORTISOL,ONE SPECIMEN    Osmolality, urine    Osmolality-If this is regarding a toxic alcohol, STOP. Test is not routinely indicated. Please consult medical  on call for further guidance.            Assessment/Plan:  Patient s a 39yF with Pmhx of IBS, Adenomyosis, exocrine insufficiency, PTSD, anxiety presents with several symptoms and here for hormonal evaluation    1) Polyuria, Polydipsia:- Discussed given A1C and also BG on CMP indicates does not have DM, anemia may affect A1C but given normal BG makes DM less likely. Given normal sodium level, do not believe she has DI either. Will obtain Urine osmo, Serum osmo and CMP for workup. Could also have polyuria d/t her adenomyosis as well. Discussed can consider  urinalysis with PCP for possible UTI?    2) Hot flashes:- discussed could be kassidy menopausal vs from other reasons. We will check thyroid, PM cortisol and IGF-1, GH levels. Will also screen for PCC and Carcinoid given her issues with anxiety, palpitations and also diarrhea earlier. Low risk otherwise. Discussed if hormonal workup neg, symptoms could also be anxiety/stress/perimenopause.     3) weight gain:- Will check thyroid and cortisol.     RTC PRN or sooner if abnormal       CC: Hormonal issues    History of Present Illness     HPI: Damir Shay is a 39 y.o. year old female with history of IBS, PTSD, VitD def who presents today d/t concern for hormonal concerns. Patient reports she's been having issues with polydipsia, hot flashes and weight gain.   Patient had her A1C checked 12/23 was 5.3%, BG on CMP 94.  Patients last TFT 06/23 euthyroid    Patient reports having polyuria, polydipsia since last month. Denies any increased intake of caffeine, alcohol or energy drinks. Drinks only mildly caffenated beverages. Drinks water often since reports is thirsty. Wakes up 3-4x at night to urinate. Does report has adenomyosis of uterus for which she is following OB soon. Also reports having issues with exocrine pancreatic insufficiency dx based on having diarrhea for 1 month and prescribed creon but she didn't take these are diarrhea now resolved. Had normal IBC now. Reports also having hot flashes mainly at night, more often now. Also reports initially lost weight when having diarrhea and now regained it back and gained 10 more lbs. Denies any wheezing. Reports anxiety as well getting worse. Reports menses are 4-8 days irregular but still cycling every month.   Denies any recent illness/new medication use    Family Hx- sister and mother have thyroid issues  Social Hx- uses marijuana, denies alcohol use anymore    Review of Systems   Constitutional:  Positive for unexpected weight change. Negative for diaphoresis and  fatigue.   Eyes:  Negative for photophobia and visual disturbance.   Gastrointestinal:  Negative for constipation, diarrhea and vomiting.   Endocrine: Positive for heat intolerance, polydipsia and polyuria.   Skin: Negative.        Historical Information   Past Medical History:   Diagnosis Date    Abnormal weight gain 10/30/2017    Cholelithiasis     COVID 10/10/2021    Elevated cortisol level 10/28/2021    Eustachian tube dysfunction, bilateral 10/28/2021    IBS (irritable bowel syndrome)     Inflammatory bowel disease     Iron deficiency anemia     Menorrhagia     Overweight (BMI 25.0-29.9) 2022    Palpitations      Past Surgical History:   Procedure Laterality Date    DILATION AND CURETTAGE OF UTERUS      WISDOM TOOTH EXTRACTION       Social History   Social History     Substance and Sexual Activity   Alcohol Use Yes    Alcohol/week: 2.0 standard drinks of alcohol    Types: 2 Standard drinks or equivalent per week    Comment: socially     Social History     Substance and Sexual Activity   Drug Use Yes    Frequency: 7.0 times per week    Types: Marijuana    Comment: medical marijuana patient     Social History     Tobacco Use   Smoking Status Former    Current packs/day: 0.00    Average packs/day: 1 pack/day for 5.0 years (5.0 ttl pk-yrs)    Types: Cigarettes    Start date: 2013    Quit date: 2018    Years since quittin.1    Passive exposure: Past   Smokeless Tobacco Never     Family History:   Family History   Problem Relation Age of Onset    Heart failure Mother     Colon polyps Mother     Hypothyroidism Sister        Meds/Allergies   Current Outpatient Medications   Medication Sig Dispense Refill    ferrous sulfate 324 (65 Fe) mg Take 1 tablet (324 mg total) by mouth every other day 45 tablet 1    linaCLOtide (Linzess) 72 MCG CAPS Take 1 capsule by mouth daily before breakfast 30 capsule 0    Multiple Vitamins-Minerals (MULTIVITAMIN ADULT) TABS Take 1 tablet by mouth      omeprazole  (PriLOSEC) 20 mg delayed release capsule Take 1 capsule (20 mg total) by mouth daily 30 capsule 1    pancrelipase, Lip-Prot-Amyl, (CREON) 24,000 units Take 1 capsule (24,000 Units total) by mouth 3 (three) times a day with meals 90 capsule 5    Sodium Fluoride 5000 PPM 1.1 % PSTE BRUSH FOR 2 MINUTES AT BEDTIME AND SPIT, DO NOT RINSE. NOTHING BY MOUTH FOR 1 HOUR AFTER       No current facility-administered medications for this visit.     Allergies   Allergen Reactions    Lubiprostone Nausea Only and GI Intolerance       Objective   Vitals: not currently breastfeeding.  Invasive Devices       None                 There is no height or weight on file to calculate BMI.  There were no vitals taken for this visit.   Wt Readings from Last 3 Encounters:   01/03/24 67.6 kg (149 lb)   12/04/23 64.3 kg (141 lb 12.8 oz)   12/01/23 65.8 kg (145 lb)       GEN: NAD  E/n/m nl facies, hearing intact bilat, tongue midline, lips nl  Eyes: no stare or proptosis, nl lids and conjunctiva, EOMI  Neck: trachea midline, thyroid NT to palpation, nl in size, no nodules or neck masses noted, no cervical LAD  CV; heart reg rate s1s2 nl, no m/r/g appreciated, no ELIGIO  Resp: CTAB, good effort  Ab+BS  Neuro: no tremor, 2+ DTRs in BUE  MS: no c/c in digits, moves all 4 ext, nl muscle bulk, gait nl  Skin: warm and dry, no palmar erythema  Ext: no c/c in digits, no edema bilaterally, 2+ DP and PT pulses bilat, no breaks in skin/ulcers on feet, sensation intact to monofilament testing on plantar surfaces bilat  Psych: nl mood and affect, no gross lapses in memory    Physical Exam    The history was obtained from the review of the chart and from the patient.    Lab Results:    Latest Reference Range & Units 05/15/18 12:51 10/16/18 09:47   DHEA 31 - 701 ng/dL 208    LUTEINIZING HORMONE mIU/mL 10.7    FSH, POC mIU/mL 9.0    ESTRADIOL LEVEL pg/mL 49.8    Testosterone, Total, LC/MS 8 - 48 ng/dL 24    TESTOSTERONE FREE 0.0 - 4.2 pg/mL 0.8    CALCIUM 8.7 -  10.2 mg/dL 9.2 9.4   TSH, POC 0.450 - 4.500 uIU/mL 2.600 2.160   Free T4 0.82 - 1.77 ng/dL 1.03 1.04        Latest Reference Range & Units 12/27/23 10:48 01/04/24 12:40 01/25/24 13:15   Sodium 134 - 144 mmol/L 138     Potassium 3.5 - 5.2 mmol/L 4.4     Chloride 96 - 106 mmol/L 104     Carbon Dioxide 20 - 29 mmol/L 21     BUN 6 - 20 mg/dL 10     Creatinine 0.57 - 1.00 mg/dL 0.71     SL AMB BUN/CREATININE RATIO 9 - 23  14     GLUCOSE 70 - 99 mg/dL 94     AST 0 - 40 IU/L 19     ALT 0 - 32 IU/L 16     Total Protein 6.0 - 8.5 g/dL 6.3     Albumin 3.9 - 4.9 g/dL 4.3     Total Bilirubin 0.0 - 1.2 mg/dL 0.5     GFR, Calculated >59 mL/min/1.73 112     MAGNESIUM 1.6 - 2.3 mg/dL   1.9   Albumin/Globulin Ratio 1.2 - 2.2  2.2     GASTRIN 0 - 115 pg/mL  19       Latest Reference Range & Units 12/27/23 10:48   Hemoglobin A1C 4.8 - 5.6 % 5.3   eAG, EST AVG Glucose mg/dL 105      08/12/21 00:00   Hemoglobin A1C 4.9 (E)   (E): External lab result     Latest Reference Range & Units 05/16/23 09:58   TSH, POC 0.450 - 4.500 uIU/mL 3.120   Free T4 0.82 - 1.77 ng/dL 0.92       Future Appointments   Date Time Provider Department Center   2/15/2024  9:20 AM Leanne Zuluaga MD ENDO  [facility]   2/22/2024  4:40 PM DO MISTY [facility]   3/6/2024  3:00 PM DO [facility]   3/21/2024  2:15 PM Brian Roberts MD [facility]

## 2024-02-21 LAB — ELASTASE PANC STL-MCNT: 178 UG ELAST./G

## 2024-02-29 DIAGNOSIS — R35.1 FREQUENT URINATION AT NIGHT: Primary | ICD-10-CM

## 2024-02-29 DIAGNOSIS — R61 UNEXPLAINED NIGHT SWEATS: ICD-10-CM

## 2024-03-01 LAB
BACTERIA UR CULT: NORMAL
Lab: NO GROWTH

## 2024-03-09 LAB
ALBUMIN SERPL-MCNC: 4.1 G/DL (ref 3.9–4.9)
ALBUMIN/GLOB SERPL: 2 {RATIO} (ref 1.2–2.2)
ALP SERPL-CCNC: 44 IU/L (ref 44–121)
ALT SERPL-CCNC: 13 IU/L (ref 0–32)
AST SERPL-CCNC: 16 IU/L (ref 0–40)
BILIRUB SERPL-MCNC: 0.5 MG/DL (ref 0–1.2)
BUN SERPL-MCNC: 12 MG/DL (ref 6–20)
BUN/CREAT SERPL: 15 (ref 9–23)
CALCIUM SERPL-MCNC: 9 MG/DL (ref 8.7–10.2)
CHLORIDE SERPL-SCNC: 106 MMOL/L (ref 96–106)
CO2 SERPL-SCNC: 21 MMOL/L (ref 20–29)
CORTIS SAL-MCNC: 0.02 UG/DL
CREAT SERPL-MCNC: 0.8 MG/DL (ref 0.57–1)
EGFR: 96 ML/MIN/1.73
GLOBULIN SER-MCNC: 2.1 G/DL (ref 1.5–4.5)
GLUCOSE SERPL-MCNC: 96 MG/DL (ref 70–99)
OSMOLALITY SERPL: 294 MOSMOL/KG (ref 275–295)
OSMOLALITY UR: 412 MOSMOL/KG
POTASSIUM SERPL-SCNC: 4.2 MMOL/L (ref 3.5–5.2)
PROT SERPL-MCNC: 6.2 G/DL (ref 6–8.5)
SODIUM SERPL-SCNC: 138 MMOL/L (ref 134–144)
T4 FREE SERPL-MCNC: 0.85 NG/DL (ref 0.82–1.77)
TSH SERPL DL<=0.005 MIU/L-ACNC: 3.35 UIU/ML (ref 0.45–4.5)

## 2024-03-12 DIAGNOSIS — E56.9 MULTIPLE VITAMIN DEFICIENCY: ICD-10-CM

## 2024-03-12 DIAGNOSIS — D50.0 IRON DEFICIENCY ANEMIA DUE TO CHRONIC BLOOD LOSS: ICD-10-CM

## 2024-03-12 DIAGNOSIS — Z13.6 SCREENING FOR CARDIOVASCULAR CONDITION: ICD-10-CM

## 2024-03-12 DIAGNOSIS — K90.9 INTESTINAL MALABSORPTION, UNSPECIFIED TYPE: Primary | ICD-10-CM

## 2024-03-13 ENCOUNTER — TELEPHONE (OUTPATIENT)
Dept: HEMATOLOGY ONCOLOGY | Facility: CLINIC | Age: 39
End: 2024-03-13

## 2024-03-13 LAB
5OH-INDOLEACETATE 24H UR-MCNC: 2.1 MG/L
5OH-INDOLEACETATE 24H UR-MRATE: 4.8 MG/24 HR (ref 0–14.9)
BASOPHILS # BLD AUTO: 0.1 X10E3/UL (ref 0–0.2)
BASOPHILS NFR BLD AUTO: 2 %
CREAT 24H UR-MRATE: 1173 MG/24 HR (ref 800–1800)
CREAT UR-MCNC: 51 MG/DL
EOSINOPHIL # BLD AUTO: 0.2 X10E3/UL (ref 0–0.4)
EOSINOPHIL NFR BLD AUTO: 4 %
ERYTHROCYTE [DISTWIDTH] IN BLOOD BY AUTOMATED COUNT: 12.2 % (ref 11.7–15.4)
FERRITIN SERPL-MCNC: 8 NG/ML (ref 15–150)
HCT VFR BLD AUTO: 36.4 % (ref 34–46.6)
HGB BLD-MCNC: 12 G/DL (ref 11.1–15.9)
IMM GRANULOCYTES # BLD: 0 X10E3/UL (ref 0–0.1)
IMM GRANULOCYTES NFR BLD: 0 %
IRON SERPL-MCNC: 36 UG/DL (ref 27–159)
LYMPHOCYTES # BLD AUTO: 1.4 X10E3/UL (ref 0.7–3.1)
LYMPHOCYTES NFR BLD AUTO: 35 %
MCH RBC QN AUTO: 30.4 PG (ref 26.6–33)
MCHC RBC AUTO-ENTMCNC: 33 G/DL (ref 31.5–35.7)
MCV RBC AUTO: 92 FL (ref 79–97)
METANEPH FREE SERPL-MCNC: <25 PG/ML (ref 0–88)
MONOCYTES # BLD AUTO: 0.4 X10E3/UL (ref 0.1–0.9)
MONOCYTES NFR BLD AUTO: 9 %
NEUTROPHILS # BLD AUTO: 2 X10E3/UL (ref 1.4–7)
NEUTROPHILS NFR BLD AUTO: 50 %
NORMETANEPHRINE SERPL-MCNC: 58.6 PG/ML (ref 0–210.1)
PLATELET # BLD AUTO: 257 X10E3/UL (ref 150–450)
RBC # BLD AUTO: 3.95 X10E6/UL (ref 3.77–5.28)
VIT B12 SERPL-MCNC: 1100 PG/ML (ref 232–1245)
WBC # BLD AUTO: 3.9 X10E3/UL (ref 3.4–10.8)

## 2024-03-13 NOTE — TELEPHONE ENCOUNTER
Appointment Schedule   Who are you speaking with? Patient   If it is not the patient, are they listed on an active communication consent form? N/A   Which provider is the appointment scheduled with? Dr Lien Morales   At which location is the appointment scheduled for? [place]   When is the appointment scheduled?  Please list date and time 3/27/24 10:20am   What is the reason for this appointment? New patient consult   Did patient voice understanding of the details of this appointment? Yes   Was the no show policy reviewed with patient? Yes

## 2024-03-21 ENCOUNTER — ANESTHESIA (OUTPATIENT)
Dept: GASTROENTEROLOGY | Facility: HOSPITAL | Age: 39
End: 2024-03-21

## 2024-03-21 ENCOUNTER — HOSPITAL ENCOUNTER (OUTPATIENT)
Dept: GASTROENTEROLOGY | Facility: HOSPITAL | Age: 39
Setting detail: OUTPATIENT SURGERY
End: 2024-03-21
Attending: INTERNAL MEDICINE
Payer: COMMERCIAL

## 2024-03-21 ENCOUNTER — ANESTHESIA EVENT (OUTPATIENT)
Dept: GASTROENTEROLOGY | Facility: HOSPITAL | Age: 39
End: 2024-03-21

## 2024-03-21 VITALS
HEIGHT: 64 IN | BODY MASS INDEX: 25.61 KG/M2 | SYSTOLIC BLOOD PRESSURE: 105 MMHG | WEIGHT: 150 LBS | OXYGEN SATURATION: 100 % | RESPIRATION RATE: 18 BRPM | DIASTOLIC BLOOD PRESSURE: 59 MMHG | HEART RATE: 52 BPM | TEMPERATURE: 97.6 F

## 2024-03-21 DIAGNOSIS — K86.89 PANCREATIC INSUFFICIENCY: ICD-10-CM

## 2024-03-21 LAB
EXT PREGNANCY TEST URINE: NEGATIVE
EXT. CONTROL: NORMAL

## 2024-03-21 PROCEDURE — 81025 URINE PREGNANCY TEST: CPT | Performed by: ANESTHESIOLOGY

## 2024-03-21 PROCEDURE — 43242 EGD US FINE NEEDLE BX/ASPIR: CPT | Performed by: INTERNAL MEDICINE

## 2024-03-21 PROCEDURE — 88305 TISSUE EXAM BY PATHOLOGIST: CPT | Performed by: STUDENT IN AN ORGANIZED HEALTH CARE EDUCATION/TRAINING PROGRAM

## 2024-03-21 RX ORDER — PROPOFOL 10 MG/ML
INJECTION, EMULSION INTRAVENOUS AS NEEDED
Status: DISCONTINUED | OUTPATIENT
Start: 2024-03-21 | End: 2024-03-21

## 2024-03-21 RX ORDER — FENTANYL CITRATE 50 UG/ML
INJECTION, SOLUTION INTRAMUSCULAR; INTRAVENOUS AS NEEDED
Status: DISCONTINUED | OUTPATIENT
Start: 2024-03-21 | End: 2024-03-21

## 2024-03-21 RX ORDER — PROPOFOL 10 MG/ML
INJECTION, EMULSION INTRAVENOUS CONTINUOUS PRN
Status: DISCONTINUED | OUTPATIENT
Start: 2024-03-21 | End: 2024-03-21

## 2024-03-21 RX ORDER — SODIUM CHLORIDE 9 MG/ML
INJECTION, SOLUTION INTRAVENOUS CONTINUOUS PRN
Status: DISCONTINUED | OUTPATIENT
Start: 2024-03-21 | End: 2024-03-21

## 2024-03-21 RX ADMIN — PROPOFOL 130 MCG/KG/MIN: 10 INJECTION, EMULSION INTRAVENOUS at 15:36

## 2024-03-21 RX ADMIN — SODIUM CHLORIDE: 0.9 INJECTION, SOLUTION INTRAVENOUS at 15:22

## 2024-03-21 RX ADMIN — PROPOFOL 50 MG: 10 INJECTION, EMULSION INTRAVENOUS at 15:36

## 2024-03-21 RX ADMIN — FENTANYL CITRATE 50 MCG: 50 INJECTION INTRAMUSCULAR; INTRAVENOUS at 15:33

## 2024-03-21 RX ADMIN — PROPOFOL 200 MG: 10 INJECTION, EMULSION INTRAVENOUS at 15:33

## 2024-03-21 RX ADMIN — FENTANYL CITRATE 25 MCG: 50 INJECTION INTRAMUSCULAR; INTRAVENOUS at 15:45

## 2024-03-21 RX ADMIN — FENTANYL CITRATE 25 MCG: 50 INJECTION INTRAMUSCULAR; INTRAVENOUS at 15:38

## 2024-03-21 RX ADMIN — SODIUM CHLORIDE: 0.9 INJECTION, SOLUTION INTRAVENOUS at 15:58

## 2024-03-21 NOTE — ANESTHESIA PREPROCEDURE EVALUATION
Procedure:  ENDOSCOPIC ULTRASOUND (UPPER)    Relevant Problems   ENDO   (+) Hypothyroidism due to acquired atrophy of thyroid      HEMATOLOGY   (+) Iron deficiency anemia due to chronic blood loss      NEURO/PSYCH   (+) Anxiety   (+) PTSD (post-traumatic stress disorder)      Behavioral Health   (+) Medical marijuana use        Physical Exam    Airway    Mallampati score: I  TM Distance: >3 FB  Neck ROM: full     Dental       Cardiovascular  Cardiovascular exam normal    Pulmonary  Pulmonary exam normal     Other Findings  post-pubertal.      Anesthesia Plan  ASA Score- 2     Anesthesia Type- IV sedation with anesthesia with ASA Monitors.         Additional Monitors:     Airway Plan:            Plan Factors-Exercise tolerance (METS): >4 METS.    Chart reviewed. EKG reviewed. Imaging results reviewed. Existing labs reviewed. Patient summary reviewed.                  Induction- intravenous.    Postoperative Plan- Plan for postoperative opioid use. Planned trial extubation    Informed Consent- Anesthetic plan and risks discussed with patient.  I personally reviewed this patient with the CRNA. Discussed and agreed on the Anesthesia Plan with the CRNA..

## 2024-03-21 NOTE — ANESTHESIA POSTPROCEDURE EVALUATION
Post-Op Assessment Note    CV Status:  Stable    Pain management: adequate       Mental Status:  Alert and awake   Hydration Status:  Euvolemic   PONV Controlled:  Controlled   Airway Patency:  Patent     Post Op Vitals Reviewed: Yes    No anethesia notable event occurred.    Staff: Anesthesiologist, CRNA               BP   96/65   Temp      Pulse  59   Resp   14   SpO2   99

## 2024-03-21 NOTE — H&P
"History and Physical -  [facility]  Damir Shay 39 y.o. female MRN: [id number]        HPI: Damir Shay is a 39 y.o. year old female who presents for EUS for pancreatic insufficiency.      REVIEW OF SYSTEMS: Per the HPI, and otherwise unremarkable.    Historical Information   Past Medical History:   Diagnosis Date    Abnormal weight gain 10/30/2017    Cholelithiasis     COVID 10/10/2021    Elevated cortisol level 10/28/2021    Eustachian tube dysfunction, bilateral 10/28/2021    IBS (irritable bowel syndrome)     Inflammatory bowel disease     Iron deficiency anemia     Menorrhagia     Overweight (BMI 25.0-29.9) 2022    Palpitations      Past Surgical History:   Procedure Laterality Date    DILATION AND CURETTAGE OF UTERUS      WISDOM TOOTH EXTRACTION       Social History   Social History     Substance and Sexual Activity   Alcohol Use Yes    Alcohol/week: 2.0 standard drinks of alcohol    Types: 2 Standard drinks or equivalent per week    Comment: socially     Social History     Substance and Sexual Activity   Drug Use Yes    Frequency: 7.0 times per week    Types: Marijuana    Comment: medical marijuana patient     Social History     Tobacco Use   Smoking Status Former    Current packs/day: 0.00    Average packs/day: 1 pack/day for 5.0 years (5.0 ttl pk-yrs)    Types: Cigarettes    Start date: 2013    Quit date: 2018    Years since quittin.2    Passive exposure: Past   Smokeless Tobacco Never     Family History   Problem Relation Age of Onset    Heart failure Mother     Colon polyps Mother     Hypothyroidism Sister        Meds/Allergies       Current Outpatient Medications:     ferrous sulfate 324 (65 Fe) mg    Multiple Vitamins-Minerals (MULTIVITAMIN ADULT) TABS    Sodium Fluoride 5000 PPM 1.1 % PSTE    Allergies   Allergen Reactions    Lubiprostone Nausea Only and GI Intolerance       Objective     /63   Pulse 62   Resp 18   Ht 5' 4\" (1.626 m)   Wt " 68 kg (150 lb)   LMP 03/08/2024 (Exact Date)   SpO2 100%   BMI 25.75 kg/m²       PHYSICAL EXAM    Gen: NAD  Head: NCAT  CV: RRR  CHEST: Clear  ABD: soft, NT/ND  EXT: no edema      ASSESSMENT/PLAN:  This is a 39 y.o. year old female here for EUS, and she is stable and optimized for her procedure.

## 2024-03-24 NOTE — PROGRESS NOTES
HEMATOLOGY / ONCOLOGY CLINIC FOLLOW UP NOTE    Patient Damir Shay  MRN: [id number]  : 1985  Date of Encounter 3/27/2024      Referring Provider: Self, Referral    Reason for Encounter: Perimenopausal/low ferritin        Oncology History    No history exists.           Assessment / Plan:      Patient s a 39yF with Pmhx of IBS, Adenomyosis, exocrine insufficiency, PTSD, anxiety presents with several symptoms perimenopausal found to have ferritin 8 total iron 36, no % sat/TIBC done, Hgb 12 MCV 92 WBC 3.9      Discussion    ID is not a final diagnosis; rather, it is indicative of an underlying etiology that is decreasing iron availability and/or increasing iron needs To effectively manage ID/NAVARRO, the underlying etiology must be identified and, if possible, treated.  the picture below outlines a number of key causes of ID/NAVARRO. In a woman of reproductive age, vaginal blood loss must be considered and explored with a detailed bleeding history.  GI blood loss or malabsorptive states must be considered in all patients. Although it is especially important in men and in all patients older than age 50 years, it is important to remember that celiac disease  and GI malignancy  can occur in both sexes at any age. Blood donors are at risk of ID, and thus current or previous blood donation is an important element to obtain from the patient history  Additional considerations include detailed medication history (eg, oral anticoagulants, antacids, proton pump inhibitors) and dietary history.            Iron Deficiency Anemia    Had long discussion about repleting iron stores with parenteral iron.  She has been on oral iron 325 mg every other day for 2 months with no change.  This is also a low dose and she has exocrine insufficiency and may not be absorbing iron in the face of continued iron loss.    Thus, 200 mg IV Venofer weekly for 6 weeks will be prescribed.  She will start within the week.  Tolerance is excellent  with minimal chance of infusion reaction as this is iron sucrose    Venofer AE s as follows:    >10%:  Cardiovascular: Hypotension (children and adolescents: 2%; adults: HDD-CKD: 39%, NDD/PDD-CKD: 2% to 3%)   Gastrointestinal: Nausea (children and adolescents: 3%; adults: 5% to 15%)  Nervous system: Headache (children and adolescents: 6%; adults: HDD-CKD: 13%, NDD/PDD-CKD: 3% to 4%)  Neuromuscular & skeletal: Muscle cramps (HDD-CKD: 29%, NDD/PDD-CKD: ?3%)  Respiratory: Nasopharyngitis (?16%) pharyngitis (?16%), sinusitis (?16%), upper respiratory tract infection (?16%)    CBC with diff and iron panel will be repeated about 8 weeks after completion    She is in agreement with this approach    Perimenopausal Symptoms    Patient with  hot flashes, fatigue, weight gain; mother had early menopause.  Patient has menorrhagia as well as adenomyosis.  Gyn following, have suggested hormonal manipulation which the patient does not want to do.  May consider Mirena as well although she feels, if the same pattern as her mother, periods will stop within the year.  However, hormonal workup is being done.    Agree with checking TFTs based on her age    Follow up     6-8 weeks after the completion of Venofer            HPI:   3/27/2024    Damir Shay is a 39 y.o. year old female with history of IBS, PTSD, VitD def who presents today d/t concern for hormonal concerns. Patient reports she's been having issues with polydipsia, hot flashes and weight gain.   Patient had her A1C checked 12/23 was 5.3%, BG on CMP 94.  Patients last TFT 06/23 euthyroid     Patient reports having polyuria, polydipsia since last month. Denies any increased intake of caffeine, alcohol or energy drinks. Drinks only mildly caffenated beverages. Drinks water often since reports is thirsty. Wakes up 3-4x at night to urinate    Does report has adenomyosis of uterus for which she is following OB soon.     Also reports having issues with exocrine pancreatic  insufficiency dx based on having diarrhea for 1 month and prescribed creon but she didn't take these are diarrhea now resolved. Had normal IBC now.     Reports also having hot flashes mainly at night, more often now. Also reports initially lost weight when having diarrhea and now regained it back and gained 10 more lbs. Denies any wheezing.     Reports anxiety as well getting worse.     Reports menses are 4-8 days irregular but still cycling every month.     Has been seen by GI, had EGD and found to have gastritis    Was found to have Hgb 12 MCV 92 plts 257 WBC 3.9 total iron 36 ferritin 8 3/12/2024; last labs in Dec 2023 with ferritin 9 with no total iron done    She has been on low dose oral iron every other day with no improvement; dose is low but this adds to constipation and thus not taking it daily or bid.  Also unclear if using PPI/H2 blocker which changes the needed acid milieu for iron absorption.      She is here today regarding parenteral iron       Denies any recent illness/new medication use.  She has some SOB but not noticeable.  She is very fatigued.  She has no blood in stool/urine, does have heavy menses, no dysphagia, odynophagia, weight loss, B symptoms.  She is having hot flashes.       Family Hx- sister and mother have thyroid issues  Social Hx- uses marijuana, denies alcohol use anymore     REVIEW OF SYSTEMS:  Please note that a 14-point review of systems was performed to include Constitutional, HEENT, Respiratory, CVS, GI, , Musculoskeletal, Integumentary, Neurologic, Rheumatologic, Endocrinologic, Psychiatric, Lymphatic, and Hematologic/Oncologic systems were reviewed and are negative unless otherwise stated in HPI. Positive and negative findings pertinent to this evaluation are incorporated into the history of present illness.    As above    ECOG PS: 0    PROBLEM LIST:  Patient Active Problem List   Diagnosis    Cholelithiasis without obstruction    Chronic constipation    Hypothyroidism  due to acquired atrophy of thyroid    Iron deficiency anemia due to chronic blood loss    Pulmonary nodule seen on imaging study    Vitamin D deficiency    PTSD (post-traumatic stress disorder)    Medical marijuana use    Irritable bowel syndrome with diarrhea    Encounter for weight management    Epigastric pain    Anxiety       Past Medical History:   has a past medical history of Abnormal weight gain (10/30/2017), Cholelithiasis, COVID (10/10/2021), Elevated cortisol level (10/28/2021), Eustachian tube dysfunction, bilateral (10/28/2021), IBS (irritable bowel syndrome), Inflammatory bowel disease, Iron deficiency anemia, Menorrhagia, Overweight (BMI 25.0-29.9) (08/06/2022), and Palpitations.    PAST SURGICAL HISTORY:   has a past surgical history that includes [place] tooth extraction and Dilation and curettage of uterus.    CURRENT MEDICATIONS  Current Outpatient Medications   Medication Sig Dispense Refill    ferrous sulfate 324 (65 Fe) mg Take 1 tablet (324 mg total) by mouth every other day 45 tablet 1    Multiple Vitamins-Minerals (MULTIVITAMIN ADULT) TABS Take 1 tablet by mouth      Sodium Fluoride 5000 PPM 1.1 % PSTE As needed       No current facility-administered medications for this visit.     [unfilled]    SOCIAL HISTORY:   reports that she quit smoking about 5 years ago. Her smoking use included cigarettes. She started smoking about 10 years ago. She has a 5 pack-year smoking history. She has been exposed to tobacco smoke. She has never used smokeless tobacco. She reports current alcohol use of about 2.0 standard drinks of alcohol per week. She reports current drug use. Frequency: 7.00 times per week. Drug: Marijuana.     FAMILY HISTORY:  family history includes Colon polyps in her mother; Heart failure in her mother; Hypothyroidism in her sister.     ALLERGIES:  is allergic to lubiprostone.      Physical Exam:  Vital Signs:   Visit Vitals  LMP 03/08/2024 (Exact Date)   OB Status Having periods   Smoking  Status Former     There is no height or weight on file to calculate BMI.  There is no height or weight on file to calculate BSA.    GEN: Alert, awake oriented x3, in no acute distress  HEENT- No pallor, icterus, cyanosis, no oral mucosal lesions,   LAD - no palpable cervical, clavicle, axillary, inguinal LAD  Heart- normal S1 S2, regular rate and rhythm, No murmur, rubs.   Lungs- clear breathing sound bilateral.   Abdomen- soft, Non tender, bowel sounds present  Extremities- No cyanosis, clubbing, edema  Neuro- No focal neurological deficit    Labs:  Lab Results   Component Value Date    WBC 3.9 03/12/2024    HGB 12.0 03/12/2024    HCT 36.4 03/12/2024    MCV 92 03/12/2024     03/12/2024     Lab Results   Component Value Date     09/12/2017    SODIUM 138 02/29/2024    K 4.2 02/29/2024     02/29/2024    CO2 21 02/29/2024    AGAP 7 12/12/2022    BUN 12 02/29/2024    CREATININE 0.80 02/29/2024    GLUC 96 02/29/2024    CALCIUM 8.9 12/12/2022    AST 16 02/29/2024    ALT 13 02/29/2024    ALKPHOS 50 12/12/2022    PROT 6.4 09/12/2017    TP 6.2 02/29/2024    BILITOT 0.6 09/12/2017    TBILI 0.5 02/29/2024    EGFR 96 02/29/2024      Latest Reference Range & Units 03/12/24 12:02   Iron 27 - 159 ug/dL 36   FERRITIN 15 - 150 ng/mL 8 (L)   Vitamin B-12 232 - 1,245 pg/mL 1,100   Lymphocytes (Absolute) 0.7 - 3.1 x10E3/uL 1.4   White Blood Cell Count 3.4 - 10.8 x10E3/uL 3.9   Red Blood Cell Count 3.77 - 5.28 x10E6/uL 3.95   Hemoglobin 11.1 - 15.9 g/dL 12.0   HCT 34.0 - 46.6 % 36.4   MCV 79 - 97 fL 92   MCH 26.6 - 33.0 pg 30.4   MCHC. 31.5 - 35.7 g/dL 33.0   RDW 11.7 - 15.4 % 12.2   Platelet Count 150 - 450 x10E3/uL 257   Neutrophils Not Estab. % 50   Lymphocytes Not Estab. % 35   Monocytes Not Estab. % 9   Eosinophils Not Estab. % 4   Basophils PCT Not Estab. % 2   Neutrophils (Absolute) 1.4 - 7.0 x10E3/uL 2.0   Monocytes (Absolute) 0.1 - 0.9 x10E3/uL 0.4   Eosinophils (Absolute) 0.0 - 0.4 x10E3/uL 0.2   Basophils  ABS 0.0 - 0.2 x10E3/uL 0.1   Immature Granulocytes Not Estab. % 0   Immature Granulocytes (Absolute) 0.0 - 0.1 x10E3/uL 0.0   (L): Data is abnormally low      I spent 60 minutes on chart review, face to face counseling time, coordination of care and documentation.    Lien Morales MD PhD

## 2024-03-26 PROCEDURE — 88305 TISSUE EXAM BY PATHOLOGIST: CPT | Performed by: STUDENT IN AN ORGANIZED HEALTH CARE EDUCATION/TRAINING PROGRAM

## 2024-03-27 ENCOUNTER — TELEPHONE (OUTPATIENT)
Age: 39
End: 2024-03-27

## 2024-03-27 ENCOUNTER — OFFICE VISIT (OUTPATIENT)
Age: 39
End: 2024-03-27
Payer: COMMERCIAL

## 2024-03-27 VITALS
DIASTOLIC BLOOD PRESSURE: 78 MMHG | OXYGEN SATURATION: 97 % | BODY MASS INDEX: 26.29 KG/M2 | HEIGHT: 64 IN | WEIGHT: 154 LBS | HEART RATE: 62 BPM | RESPIRATION RATE: 18 BRPM | SYSTOLIC BLOOD PRESSURE: 121 MMHG | TEMPERATURE: 97.6 F

## 2024-03-27 DIAGNOSIS — D50.0 IRON DEFICIENCY ANEMIA DUE TO CHRONIC BLOOD LOSS: Primary | ICD-10-CM

## 2024-03-27 PROCEDURE — 99245 OFF/OP CONSLTJ NEW/EST HI 55: CPT | Performed by: INTERNAL MEDICINE

## 2024-03-27 RX ORDER — SODIUM CHLORIDE 9 MG/ML
20 INJECTION, SOLUTION INTRAVENOUS ONCE
OUTPATIENT
Start: 2024-04-11

## 2024-03-27 RX ORDER — SODIUM CHLORIDE 9 MG/ML
20 INJECTION, SOLUTION INTRAVENOUS ONCE
Status: CANCELLED | OUTPATIENT
Start: 2024-03-27

## 2024-03-27 NOTE — PATIENT INSTRUCTIONS
Will get call regarding scheduling Venofer starting next week     Labs about 6-8 weeks after complete Venofer    Follow up in approximately 3 months

## 2024-03-27 NOTE — TELEPHONE ENCOUNTER
What would be a preferred day of the week that would work best for your infusion appointment? Tuesday through Friday  Do you prefer mornings or afternoons for your appointments? Mid-morning   Are there any days or dates that do not work for your schedule, including any upcoming vacations? Mondays  We are going to try our best to schedule you at the infusion center closest to your home.  In the event that we are unable to what would be your next preferred infusion site or sites? [facility]  Do you have transportation to take you to all of your appointments? yes

## 2024-04-11 ENCOUNTER — HOSPITAL ENCOUNTER (OUTPATIENT)
Dept: INFUSION CENTER | Facility: HOSPITAL | Age: 39
Discharge: HOME | End: 2024-04-11
Attending: INTERNAL MEDICINE
Payer: COMMERCIAL

## 2024-04-11 VITALS
RESPIRATION RATE: 16 BRPM | TEMPERATURE: 97.9 F | OXYGEN SATURATION: 100 % | HEART RATE: 63 BPM | SYSTOLIC BLOOD PRESSURE: 120 MMHG | DIASTOLIC BLOOD PRESSURE: 70 MMHG

## 2024-04-11 DIAGNOSIS — D50.0 IRON DEFICIENCY ANEMIA DUE TO CHRONIC BLOOD LOSS: Primary | ICD-10-CM

## 2024-04-11 PROCEDURE — 96365 THER/PROPH/DIAG IV INF INIT: CPT

## 2024-04-11 RX ORDER — SODIUM CHLORIDE 9 MG/ML
20 INJECTION, SOLUTION INTRAVENOUS ONCE
Status: CANCELLED | OUTPATIENT
Start: 2024-04-18

## 2024-04-11 RX ORDER — SODIUM CHLORIDE 9 MG/ML
20 INJECTION, SOLUTION INTRAVENOUS ONCE
Status: COMPLETED | OUTPATIENT
Start: 2024-04-11 | End: 2024-04-11

## 2024-04-11 RX ADMIN — SODIUM CHLORIDE 20 ML/HR: 9 INJECTION, SOLUTION INTRAVENOUS at 09:37

## 2024-04-11 RX ADMIN — IRON SUCROSE 200 MG: 20 INJECTION, SOLUTION INTRAVENOUS at 09:38

## 2024-04-11 NOTE — PROGRESS NOTES
Damir Shay  tolerated treatment well with no complications.      Damir Shay is aware of future appt on 4/18/24 at 1200.     AVS printed and given to Damir Shay:  No (Declined by Damir Shay)

## 2024-04-18 ENCOUNTER — HOSPITAL ENCOUNTER (OUTPATIENT)
Dept: INFUSION CENTER | Facility: HOSPITAL | Age: 39
Discharge: HOME | End: 2024-04-18
Attending: INTERNAL MEDICINE
Payer: COMMERCIAL

## 2024-04-18 VITALS
RESPIRATION RATE: 16 BRPM | TEMPERATURE: 98.1 F | OXYGEN SATURATION: 100 % | DIASTOLIC BLOOD PRESSURE: 56 MMHG | HEART RATE: 58 BPM | SYSTOLIC BLOOD PRESSURE: 100 MMHG

## 2024-04-18 DIAGNOSIS — D50.0 IRON DEFICIENCY ANEMIA DUE TO CHRONIC BLOOD LOSS: Primary | ICD-10-CM

## 2024-04-18 PROCEDURE — 96365 THER/PROPH/DIAG IV INF INIT: CPT

## 2024-04-18 RX ORDER — SODIUM CHLORIDE 9 MG/ML
20 INJECTION, SOLUTION INTRAVENOUS ONCE
Status: CANCELLED | OUTPATIENT
Start: 2024-04-25

## 2024-04-18 RX ORDER — SODIUM CHLORIDE 9 MG/ML
20 INJECTION, SOLUTION INTRAVENOUS ONCE
Status: COMPLETED | OUTPATIENT
Start: 2024-04-18 | End: 2024-04-18

## 2024-04-18 RX ADMIN — SODIUM CHLORIDE 20 ML/HR: 9 INJECTION, SOLUTION INTRAVENOUS at 12:04

## 2024-04-18 RX ADMIN — IRON SUCROSE 200 MG: 20 INJECTION, SOLUTION INTRAVENOUS at 12:07

## 2024-04-18 NOTE — PROGRESS NOTES
Patient Venofer infusion completed without complication. Patient declined AVS at this time and confirmed next appointment on Thursday. Patient discharged in stable condition to home via ambulation.

## 2024-04-25 ENCOUNTER — HOSPITAL ENCOUNTER (OUTPATIENT)
Dept: INFUSION CENTER | Facility: HOSPITAL | Age: 39
Discharge: HOME | End: 2024-04-25
Attending: INTERNAL MEDICINE
Payer: COMMERCIAL

## 2024-04-25 VITALS
TEMPERATURE: 97.2 F | SYSTOLIC BLOOD PRESSURE: 104 MMHG | DIASTOLIC BLOOD PRESSURE: 64 MMHG | HEART RATE: 57 BPM | RESPIRATION RATE: 16 BRPM | OXYGEN SATURATION: 100 %

## 2024-04-25 DIAGNOSIS — D50.0 IRON DEFICIENCY ANEMIA DUE TO CHRONIC BLOOD LOSS: Primary | ICD-10-CM

## 2024-04-25 PROCEDURE — 96365 THER/PROPH/DIAG IV INF INIT: CPT

## 2024-04-25 RX ORDER — SODIUM CHLORIDE 9 MG/ML
20 INJECTION, SOLUTION INTRAVENOUS ONCE
Status: COMPLETED | OUTPATIENT
Start: 2024-04-25 | End: 2024-04-25

## 2024-04-25 RX ORDER — SODIUM CHLORIDE 9 MG/ML
20 INJECTION, SOLUTION INTRAVENOUS ONCE
Status: CANCELLED | OUTPATIENT
Start: 2024-05-02

## 2024-04-25 RX ADMIN — IRON SUCROSE 200 MG: 20 INJECTION, SOLUTION INTRAVENOUS at 10:22

## 2024-04-25 RX ADMIN — SODIUM CHLORIDE 20 ML/HR: 9 INJECTION, SOLUTION INTRAVENOUS at 10:22

## 2024-04-25 NOTE — PROGRESS NOTES
Pt tolerated venofer infusion with no adverse reactions. Iv removed. Pt left ambulatory with steady gait. Declined AVS     Aware of next appt 05/02/24 @ 1100 am

## 2024-05-02 ENCOUNTER — HOSPITAL ENCOUNTER (OUTPATIENT)
Dept: INFUSION CENTER | Facility: HOSPITAL | Age: 39
Discharge: HOME | End: 2024-05-02
Attending: INTERNAL MEDICINE
Payer: COMMERCIAL

## 2024-05-02 VITALS
RESPIRATION RATE: 16 BRPM | HEART RATE: 53 BPM | DIASTOLIC BLOOD PRESSURE: 64 MMHG | OXYGEN SATURATION: 100 % | SYSTOLIC BLOOD PRESSURE: 108 MMHG | TEMPERATURE: 98.8 F

## 2024-05-02 DIAGNOSIS — D50.0 IRON DEFICIENCY ANEMIA DUE TO CHRONIC BLOOD LOSS: Primary | ICD-10-CM

## 2024-05-02 PROCEDURE — 96365 THER/PROPH/DIAG IV INF INIT: CPT

## 2024-05-02 RX ORDER — SODIUM CHLORIDE 9 MG/ML
20 INJECTION, SOLUTION INTRAVENOUS ONCE
Status: COMPLETED | OUTPATIENT
Start: 2024-05-02 | End: 2024-05-02

## 2024-05-02 RX ORDER — SODIUM CHLORIDE 9 MG/ML
20 INJECTION, SOLUTION INTRAVENOUS ONCE
Status: CANCELLED | OUTPATIENT
Start: 2024-05-09

## 2024-05-02 RX ADMIN — SODIUM CHLORIDE 20 ML/HR: 9 INJECTION, SOLUTION INTRAVENOUS at 11:21

## 2024-05-02 RX ADMIN — IRON SUCROSE 200 MG: 20 INJECTION, SOLUTION INTRAVENOUS at 11:22

## 2024-05-02 NOTE — PROGRESS NOTES
..Damir Shay  tolerated treatment well with no complications.      Damir Shay is aware of future appt on 5/9 at 1000.     AVS printed and given to Damir Shay  No (Declined by Damir Shay)

## 2024-05-09 ENCOUNTER — HOSPITAL ENCOUNTER (OUTPATIENT)
Dept: INFUSION CENTER | Facility: HOSPITAL | Age: 39
Discharge: HOME | End: 2024-05-09
Attending: INTERNAL MEDICINE
Payer: COMMERCIAL

## 2024-05-09 VITALS
RESPIRATION RATE: 16 BRPM | DIASTOLIC BLOOD PRESSURE: 66 MMHG | TEMPERATURE: 97.5 F | OXYGEN SATURATION: 100 % | HEART RATE: 47 BPM | SYSTOLIC BLOOD PRESSURE: 102 MMHG

## 2024-05-09 DIAGNOSIS — D50.0 IRON DEFICIENCY ANEMIA DUE TO CHRONIC BLOOD LOSS: Primary | ICD-10-CM

## 2024-05-09 PROCEDURE — 96365 THER/PROPH/DIAG IV INF INIT: CPT

## 2024-05-09 RX ORDER — SODIUM CHLORIDE 9 MG/ML
20 INJECTION, SOLUTION INTRAVENOUS ONCE
Status: CANCELLED | OUTPATIENT
Start: 2024-05-16

## 2024-05-09 RX ORDER — SODIUM CHLORIDE 9 MG/ML
20 INJECTION, SOLUTION INTRAVENOUS ONCE
Status: COMPLETED | OUTPATIENT
Start: 2024-05-09 | End: 2024-05-09

## 2024-05-09 RX ADMIN — SODIUM CHLORIDE 20 ML/HR: 9 INJECTION, SOLUTION INTRAVENOUS at 10:03

## 2024-05-09 RX ADMIN — IRON SUCROSE 200 MG: 20 INJECTION, SOLUTION INTRAVENOUS at 10:03

## 2024-05-09 NOTE — PROGRESS NOTES
Damir Shay  tolerated treatment well with no complications.      Damir Shay is aware of future appt on 5/16 at 1030.     AVS printed and given to Damir Shay:  No (Declined by Damir Shay)

## 2024-05-14 DIAGNOSIS — E53.8 VITAMIN B12 DEFICIENCY: ICD-10-CM

## 2024-05-14 DIAGNOSIS — E55.9 VITAMIN D DEFICIENCY: ICD-10-CM

## 2024-05-14 DIAGNOSIS — D50.0 IRON DEFICIENCY ANEMIA DUE TO CHRONIC BLOOD LOSS: Primary | ICD-10-CM

## 2024-05-16 ENCOUNTER — HOSPITAL ENCOUNTER (OUTPATIENT)
Dept: INFUSION CENTER | Facility: HOSPITAL | Age: 39
Discharge: HOME | End: 2024-05-16
Attending: INTERNAL MEDICINE
Payer: COMMERCIAL

## 2024-05-16 VITALS
TEMPERATURE: 98.6 F | DIASTOLIC BLOOD PRESSURE: 58 MMHG | OXYGEN SATURATION: 100 % | HEART RATE: 49 BPM | RESPIRATION RATE: 16 BRPM | SYSTOLIC BLOOD PRESSURE: 111 MMHG

## 2024-05-16 DIAGNOSIS — D50.0 IRON DEFICIENCY ANEMIA DUE TO CHRONIC BLOOD LOSS: Primary | ICD-10-CM

## 2024-05-16 RX ORDER — SODIUM CHLORIDE 9 MG/ML
20 INJECTION, SOLUTION INTRAVENOUS ONCE
Status: COMPLETED | OUTPATIENT
Start: 2024-05-16 | End: 2024-05-16

## 2024-05-16 RX ORDER — SODIUM CHLORIDE 9 MG/ML
20 INJECTION, SOLUTION INTRAVENOUS ONCE
Status: CANCELLED | OUTPATIENT
Start: 2024-05-16

## 2024-05-16 RX ADMIN — SODIUM CHLORIDE 20 ML/HR: 9 INJECTION, SOLUTION INTRAVENOUS at 10:47

## 2024-05-16 RX ADMIN — IRON SUCROSE 200 MG: 20 INJECTION, SOLUTION INTRAVENOUS at 10:47

## 2024-05-16 NOTE — PROGRESS NOTES
..Damir Shay  tolerated treatment well with no complications.      Damir Shay has completed her therapy plan and has no more appointments to be scheduled.     AVS printed and given to Damir Shay:  No (Declined by Damir Shay)

## 2024-05-17 DIAGNOSIS — R00.2 PALPITATION: Primary | ICD-10-CM

## 2024-06-03 ENCOUNTER — APPOINTMENT (EMERGENCY)
Dept: CT IMAGING | Facility: HOSPITAL | Age: 39
End: 2024-06-03
Payer: COMMERCIAL

## 2024-06-03 ENCOUNTER — HOSPITAL ENCOUNTER (EMERGENCY)
Facility: HOSPITAL | Age: 39
Discharge: HOME | End: 2024-06-03
Attending: EMERGENCY MEDICINE
Payer: COMMERCIAL

## 2024-06-03 VITALS
RESPIRATION RATE: 16 BRPM | WEIGHT: 155 LBS | BODY MASS INDEX: 26.61 KG/M2 | TEMPERATURE: 98.8 F | OXYGEN SATURATION: 100 % | SYSTOLIC BLOOD PRESSURE: 120 MMHG | HEART RATE: 52 BPM | DIASTOLIC BLOOD PRESSURE: 61 MMHG

## 2024-06-03 DIAGNOSIS — M54.41 ACUTE BILATERAL LOW BACK PAIN WITH BILATERAL SCIATICA: Primary | ICD-10-CM

## 2024-06-03 DIAGNOSIS — R10.30 LOWER ABDOMINAL PAIN: ICD-10-CM

## 2024-06-03 DIAGNOSIS — M54.42 ACUTE BILATERAL LOW BACK PAIN WITH BILATERAL SCIATICA: Primary | ICD-10-CM

## 2024-06-03 LAB
ALBUMIN SERPL BCP-MCNC: 4.2 G/DL (ref 3.5–5)
ALP SERPL-CCNC: 34 U/L (ref 34–104)
ALT SERPL W P-5'-P-CCNC: 11 U/L (ref 7–52)
AMORPH URATE CRY URNS QL MICRO: ABNORMAL
ANION GAP SERPL CALCULATED.3IONS-SCNC: 4 MMOL/L (ref 4–13)
AST SERPL W P-5'-P-CCNC: 13 U/L (ref 13–39)
BACTERIA UR QL AUTO: ABNORMAL /HPF
BASOPHILS # BLD AUTO: 0.05 THOUSANDS/ÂΜL (ref 0–0.1)
BASOPHILS NFR BLD AUTO: 1 % (ref 0–1)
BILIRUB SERPL-MCNC: 0.39 MG/DL (ref 0.2–1)
BILIRUB UR QL STRIP: NEGATIVE
BUN SERPL-MCNC: 12 MG/DL (ref 5–25)
CALCIUM SERPL-MCNC: 9.1 MG/DL (ref 8.4–10.2)
CHLORIDE SERPL-SCNC: 107 MMOL/L (ref 96–108)
CLARITY UR: ABNORMAL
CO2 SERPL-SCNC: 28 MMOL/L (ref 21–32)
COLOR UR: YELLOW
CREAT SERPL-MCNC: 0.71 MG/DL (ref 0.6–1.3)
EOSINOPHIL # BLD AUTO: 0.17 THOUSAND/ÂΜL (ref 0–0.61)
EOSINOPHIL NFR BLD AUTO: 4 % (ref 0–6)
ERYTHROCYTE [DISTWIDTH] IN BLOOD BY AUTOMATED COUNT: 13.4 % (ref 11.6–15.1)
EXT PREGNANCY TEST URINE: NEGATIVE
EXT. CONTROL: NORMAL
GFR SERPL CREATININE-BSD FRML MDRD: 107 ML/MIN/1.73SQ M
GLUCOSE SERPL-MCNC: 94 MG/DL (ref 65–140)
GLUCOSE UR STRIP-MCNC: NEGATIVE MG/DL
HCT VFR BLD AUTO: 37.4 % (ref 34.8–46.1)
HGB BLD-MCNC: 12.5 G/DL (ref 11.5–15.4)
HGB UR QL STRIP.AUTO: NEGATIVE
IMM GRANULOCYTES # BLD AUTO: 0.01 THOUSAND/UL (ref 0–0.2)
IMM GRANULOCYTES NFR BLD AUTO: 0 % (ref 0–2)
KETONES UR STRIP-MCNC: NEGATIVE MG/DL
LEUKOCYTE ESTERASE UR QL STRIP: NEGATIVE
LIPASE SERPL-CCNC: 69 U/L (ref 11–82)
LYMPHOCYTES # BLD AUTO: 1.33 THOUSANDS/ÂΜL (ref 0.6–4.47)
LYMPHOCYTES NFR BLD AUTO: 29 % (ref 14–44)
MCH RBC QN AUTO: 31.6 PG (ref 26.8–34.3)
MCHC RBC AUTO-ENTMCNC: 33.4 G/DL (ref 31.4–37.4)
MCV RBC AUTO: 94 FL (ref 82–98)
MONOCYTES # BLD AUTO: 0.35 THOUSAND/ÂΜL (ref 0.17–1.22)
MONOCYTES NFR BLD AUTO: 8 % (ref 4–12)
NEUTROPHILS # BLD AUTO: 2.64 THOUSANDS/ÂΜL (ref 1.85–7.62)
NEUTS SEG NFR BLD AUTO: 58 % (ref 43–75)
NITRITE UR QL STRIP: NEGATIVE
NON-SQ EPI CELLS URNS QL MICRO: ABNORMAL /HPF
NRBC BLD AUTO-RTO: 0 /100 WBCS
PH UR STRIP.AUTO: 7.5 [PH]
PLATELET # BLD AUTO: 187 THOUSANDS/UL (ref 149–390)
PMV BLD AUTO: 10.2 FL (ref 8.9–12.7)
POTASSIUM SERPL-SCNC: 4.3 MMOL/L (ref 3.5–5.3)
PROT SERPL-MCNC: 6.4 G/DL (ref 6.4–8.4)
PROT UR STRIP-MCNC: ABNORMAL MG/DL
RBC # BLD AUTO: 3.96 MILLION/UL (ref 3.81–5.12)
RBC #/AREA URNS AUTO: ABNORMAL /HPF
SODIUM SERPL-SCNC: 139 MMOL/L (ref 135–147)
SP GR UR STRIP.AUTO: 1.02 (ref 1–1.03)
UROBILINOGEN UR STRIP-ACNC: <2 MG/DL
WBC # BLD AUTO: 4.55 THOUSAND/UL (ref 4.31–10.16)
WBC #/AREA URNS AUTO: ABNORMAL /HPF

## 2024-06-03 PROCEDURE — 74176 CT ABD & PELVIS W/O CONTRAST: CPT

## 2024-06-03 PROCEDURE — 81514 NFCT DS BV&VAGINITIS DNA ALG: CPT

## 2024-06-03 PROCEDURE — 36415 COLL VENOUS BLD VENIPUNCTURE: CPT

## 2024-06-03 PROCEDURE — 99284 EMERGENCY DEPT VISIT MOD MDM: CPT

## 2024-06-03 PROCEDURE — 83690 ASSAY OF LIPASE: CPT

## 2024-06-03 PROCEDURE — 81001 URINALYSIS AUTO W/SCOPE: CPT

## 2024-06-03 PROCEDURE — 80053 COMPREHEN METABOLIC PANEL: CPT

## 2024-06-03 PROCEDURE — 96374 THER/PROPH/DIAG INJ IV PUSH: CPT

## 2024-06-03 PROCEDURE — 81025 URINE PREGNANCY TEST: CPT

## 2024-06-03 PROCEDURE — 85025 COMPLETE CBC W/AUTO DIFF WBC: CPT

## 2024-06-03 PROCEDURE — 96361 HYDRATE IV INFUSION ADD-ON: CPT

## 2024-06-03 RX ORDER — LIDOCAINE 50 MG/G
1 PATCH TOPICAL ONCE
Status: DISCONTINUED | OUTPATIENT
Start: 2024-06-03 | End: 2024-06-03 | Stop reason: HOSPADM

## 2024-06-03 RX ORDER — ACETAMINOPHEN 500 MG
1000 TABLET ORAL 3 TIMES DAILY PRN
Qty: 30 TABLET | Refills: 0 | Status: SHIPPED | OUTPATIENT
Start: 2024-06-03 | End: 2024-06-08

## 2024-06-03 RX ORDER — TIZANIDINE HYDROCHLORIDE 4 MG/1
4 CAPSULE, GELATIN COATED ORAL 3 TIMES DAILY PRN
Qty: 15 CAPSULE | Refills: 0 | Status: SHIPPED | OUTPATIENT
Start: 2024-06-03 | End: 2024-06-04

## 2024-06-03 RX ORDER — IBUPROFEN 600 MG/1
TABLET ORAL
Qty: 18 TABLET | Refills: 0 | Status: SHIPPED | OUTPATIENT
Start: 2024-06-03 | End: 2024-06-08

## 2024-06-03 RX ORDER — KETOROLAC TROMETHAMINE 30 MG/ML
15 INJECTION, SOLUTION INTRAMUSCULAR; INTRAVENOUS ONCE
Status: COMPLETED | OUTPATIENT
Start: 2024-06-03 | End: 2024-06-03

## 2024-06-03 RX ADMIN — SODIUM CHLORIDE 1000 ML: 0.9 INJECTION, SOLUTION INTRAVENOUS at 09:03

## 2024-06-03 RX ADMIN — KETOROLAC TROMETHAMINE 15 MG: 30 INJECTION, SOLUTION INTRAMUSCULAR; INTRAVENOUS at 10:00

## 2024-06-03 NOTE — ED PROVIDER NOTES
History  Chief Complaint   Patient presents with    Back Pain     Pt to ED c/o lower back pain since Thursday. States the pain goes down her legs. Started wrapping around to the abd at the end of the day. Typically pain improves after up and walking around a little bit     The patient is a 39-year-old female presenting for evaluation of acute low back pain.  Patient started on Tuesday (6 days PTA) with pain to the lower middle back described as intermittent, radiating to the lower abdomen and anterolateral thighs, relieved with sitting upright, and worsened with movement and bending forward.  She initially attributed this pain/injury to her back to intercourse that she had with her partner on Tuesday.  She also notes lifting fevers and doing yard work over the past week.  However, she also endorses cramping lower abdominal pain, feeling of gas, and bloating.  She has a history of adenomyosis but this is more severe than her typical lower abdominal cramping/feeling of bloating.  She has been taking her medical marijuana and rubbing THC cream without relief of her pain.  She notes having a low-grade temperature of  a few days ago while working outside, otherwise denies fevers.  She denies associated nausea, vomiting, and change in bowel.  She notes at baseline having IBS with constipation features.  She denies new flank pain, urinary urgency or frequency, foul-smelling urine, hematuria, and dysuria.      History provided by:  Patient   used: No        Prior to Admission Medications   Prescriptions Last Dose Informant Patient Reported? Taking?   Multiple Vitamins-Minerals (MULTIVITAMIN ADULT) TABS  Self Yes No   Sig: Take 1 tablet by mouth   Sodium Fluoride 5000 PPM 1.1 % PSTE  Self Yes No   Sig: As needed   ferrous sulfate 324 (65 Fe) mg   No No   Sig: Take 1 tablet (324 mg total) by mouth every other day   Patient not taking: Reported on 4/11/2024      Facility-Administered Medications: None  "      Past Medical History:   Diagnosis Date    Abnormal weight gain 10/30/2017    Cholelithiasis     COVID 10/10/2021    Elevated cortisol level 10/28/2021    Eustachian tube dysfunction, bilateral 10/28/2021    IBS (irritable bowel syndrome)     Inflammatory bowel disease     Iron deficiency anemia     Menorrhagia     Overweight (BMI 25.0-29.9) 2022    Palpitations        Past Surgical History:   Procedure Laterality Date    DILATION AND CURETTAGE OF UTERUS      WISDOM TOOTH EXTRACTION         Family History   Problem Relation Age of Onset    Heart failure Mother     Colon polyps Mother     Hypothyroidism Sister      I have reviewed and agree with the history as documented.    E-Cigarette/Vaping    E-Cigarette Use Former User     Quit Date 18      E-Cigarette/Vaping Substances    Nicotine No     THC No     CBD No     Flavoring No     Other No     Unknown No      Social History     Tobacco Use    Smoking status: Former     Current packs/day: 0.00     Average packs/day: 1 pack/day for 5.0 years (5.0 ttl pk-yrs)     Types: Cigarettes     Start date: 2013     Quit date: 2018     Years since quittin.4     Passive exposure: Past    Smokeless tobacco: Never   Vaping Use    Vaping status: Former    Quit date: 2018   Substance Use Topics    Alcohol use: Yes     Alcohol/week: 2.0 standard drinks of alcohol     Types: 2 Standard drinks or equivalent per week     Comment: socially    Drug use: Yes     Frequency: 7.0 times per week     Types: Marijuana     Comment: medical marijuana patient       Review of Systems   Constitutional:  Positive for fever ( \"a few days ago\"; denies recurrence of fevers or persistent fevers). Negative for chills.   HENT: Negative.     Respiratory:  Negative for cough and shortness of breath.    Cardiovascular:  Negative for chest pain.   Gastrointestinal:  Positive for abdominal pain (Lower abdominal pain) and constipation. Negative for blood in stool, " "diarrhea, nausea, rectal pain and vomiting.   Genitourinary:  Positive for pelvic pain, vaginal bleeding (after intercourse 5 days ago; light pink spotting; resolved) and vaginal discharge (\"The discharge I typically get prior to my period\"). Negative for decreased urine volume, difficulty urinating, dysuria, flank pain, frequency, hematuria, urgency and vaginal pain.   Musculoskeletal:  Positive for back pain, gait problem (Increased difficulty with walking secondary to low back pain) and myalgias (Bilateral thighs). Negative for arthralgias and joint swelling.   Skin:  Negative for rash and wound.   Neurological:  Positive for weakness (\"my legs feel more weak when trying to get up\"). Negative for dizziness, syncope, light-headedness, numbness and headaches.   All other systems reviewed and are negative.      Physical Exam  Physical Exam  Vitals and nursing note reviewed.   Constitutional:       General: She is awake. She is not in acute distress (Evidencing moderate discomfort, in no acute distress).     Appearance: Normal appearance. She is well-developed. She is not ill-appearing, toxic-appearing or diaphoretic.   HENT:      Head: Normocephalic and atraumatic.      Jaw: There is normal jaw occlusion.      Nose: Nose normal.      Mouth/Throat:      Lips: Pink. No lesions.      Mouth: Mucous membranes are moist.   Eyes:      General: Lids are normal. Vision grossly intact. Gaze aligned appropriately.      Extraocular Movements: Extraocular movements intact.      Conjunctiva/sclera: Conjunctivae normal.   Neck:      Trachea: Phonation normal. No abnormal tracheal secretions.   Cardiovascular:      Rate and Rhythm: Normal rate and regular rhythm.      Pulses:           Radial pulses are 2+ on the right side and 2+ on the left side.        Dorsalis pedis pulses are 2+ on the right side and 2+ on the left side.        Posterior tibial pulses are 2+ on the right side and 2+ on the left side.      Heart sounds: Normal " heart sounds, S1 normal and S2 normal. No murmur heard.  Pulmonary:      Effort: Pulmonary effort is normal. No tachypnea or respiratory distress.      Breath sounds: Normal breath sounds and air entry. No stridor, decreased air movement or transmitted upper airway sounds. No decreased breath sounds.   Abdominal:      General: Bowel sounds are normal. There is no distension.      Palpations: Abdomen is soft.      Tenderness: There is abdominal tenderness in the right lower quadrant, suprapubic area and left lower quadrant. There is no right CVA tenderness, left CVA tenderness, guarding or rebound. Negative signs include Hussein's sign.   Musculoskeletal:         General: Normal range of motion.      Cervical back: Normal and neck supple. No bony tenderness. No spinous process tenderness.      Thoracic back: Normal. No swelling, edema, deformity, signs of trauma or bony tenderness. Normal range of motion.      Lumbar back: Tenderness present. No swelling, edema, deformity, signs of trauma or bony tenderness. Normal range of motion. Negative right straight leg raise test and negative left straight leg raise test.        Back:       Right hip: Normal. No bony tenderness. Normal range of motion.      Left hip: Normal. No bony tenderness. Normal range of motion.      Right upper leg: Normal.      Left upper leg: Normal.      Right knee: Normal.      Left knee: Normal.      Right lower leg: Normal. No edema.      Left lower leg: Normal. No edema.      Right ankle: Normal.      Left ankle: Normal.      Comments: LINDSAY, 5/5 strength throughout, sensation intact, no focal joint swelling. Ambulatory with steady gait.   Skin:     General: Skin is warm and dry.      Capillary Refill: Capillary refill takes less than 2 seconds.      Findings: No rash or wound.   Neurological:      General: No focal deficit present.      Mental Status: She is alert and oriented to person, place, and time. Mental status is at baseline.      GCS: GCS  eye subscore is 4. GCS verbal subscore is 5. GCS motor subscore is 6.      Sensory: Sensation is intact.      Motor: Motor function is intact.   Psychiatric:         Behavior: Behavior is cooperative.         Vital Signs  ED Triage Vitals   Temperature Pulse Respirations Blood Pressure SpO2   06/03/24 0803 06/03/24 0806 06/03/24 0803 06/03/24 0806 06/03/24 0803   98.8 °F (37.1 °C) 68 18 131/61 99 %      Temp Source Heart Rate Source Patient Position - Orthostatic VS BP Location FiO2 (%)   06/03/24 0803 -- 06/03/24 0806 06/03/24 0806 --   Temporal  Sitting Left arm       Pain Score       06/03/24 0803       10 - Worst Possible Pain           Vitals:    06/03/24 0945 06/03/24 1000 06/03/24 1015 06/03/24 1045   BP:   120/61    Pulse: 55 (!) 51 55 (!) 52   Patient Position - Orthostatic VS:             Visual Acuity  Visual Acuity      Flowsheet Row Most Recent Value   L Pupil Size (mm) 3   R Pupil Size (mm) 3            ED Medications  Medications   sodium chloride 0.9 % bolus 1,000 mL (0 mL Intravenous Stopped 6/3/24 1003)   ketorolac (TORADOL) injection 15 mg (15 mg Intravenous Given 6/3/24 1000)       Diagnostic Studies  Results Reviewed       Procedure Component Value Units Date/Time    Chlamydia/GC amplified DNA by PCR [[id number]] Collected: 06/03/24 1108    Lab Status: In process Specimen: Vaginal Updated: 06/03/24 1119    Molecular Vaginal Panel [[id number]] Collected: 06/03/24 1108    Lab Status: In process Specimen: Genital from Vaginal Updated: 06/03/24 1119    Urine Microscopic [[id number]]  (Abnormal) Collected: 06/03/24 0903    Lab Status: Final result Specimen: Urine, Clean Catch Updated: 06/03/24 0948     RBC, UA None Seen /hpf      WBC, UA 2-4 /hpf      Epithelial Cells Occasional /hpf      Bacteria, UA Moderate /hpf      Amorphous Crystals, UA Moderate    UA w Reflex to Microscopic w Reflex to Culture [[id number]]  (Abnormal) Collected: 06/03/24 0903    Lab Status: Final result Specimen: Urine, Clean  Catch Updated: 06/03/24 0947     Color, UA Yellow     Clarity, UA Slightly Cloudy     Specific Gravity, UA 1.020     pH, UA 7.5     Leukocytes, UA Negative     Nitrite, UA Negative     Protein, UA Trace mg/dl      Glucose, UA Negative mg/dl      Ketones, UA Negative mg/dl      Urobilinogen, UA <2.0 mg/dl      Bilirubin, UA Negative     Occult Blood, UA Negative    Comprehensive metabolic panel [[id number]] Collected: 06/03/24 0903    Lab Status: Final result Specimen: Blood from Arm, Right Updated: 06/03/24 0931     Sodium 139 mmol/L      Potassium 4.3 mmol/L      Chloride 107 mmol/L      CO2 28 mmol/L      ANION GAP 4 mmol/L      BUN 12 mg/dL      Creatinine 0.71 mg/dL      Glucose 94 mg/dL      Calcium 9.1 mg/dL      AST 13 U/L      ALT 11 U/L      Alkaline Phosphatase 34 U/L      Total Protein 6.4 g/dL      Albumin 4.2 g/dL      Total Bilirubin 0.39 mg/dL      eGFR 107 ml/min/1.73sq m     Narrative:      National Kidney Disease Foundation guidelines for Chronic Kidney Disease (CKD):     Stage 1 with normal or high GFR (GFR > 90 mL/min/1.73 square meters)    Stage 2 Mild CKD (GFR = 60-89 mL/min/1.73 square meters)    Stage 3A Moderate CKD (GFR = 45-59 mL/min/1.73 square meters)    Stage 3B Moderate CKD (GFR = 30-44 mL/min/1.73 square meters)    Stage 4 Severe CKD (GFR = 15-29 mL/min/1.73 square meters)    Stage 5 End Stage CKD (GFR <15 mL/min/1.73 square meters)  Note: GFR calculation is accurate only with a steady state creatinine    Lipase [[id number]]  (Normal) Collected: 06/03/24 0903    Lab Status: Final result Specimen: Blood from Arm, Right Updated: 06/03/24 0931     Lipase 69 u/L     CBC and differential [[id number]] Collected: 06/03/24 0903    Lab Status: Final result Specimen: Blood from Arm, Right Updated: 06/03/24 0914     WBC 4.55 Thousand/uL      RBC 3.96 Million/uL      Hemoglobin 12.5 g/dL      Hematocrit 37.4 %      MCV 94 fL      MCH 31.6 pg      MCHC 33.4 g/dL      RDW 13.4 %      MPV 10.2 fL       Platelets 187 Thousands/uL      nRBC 0 /100 WBCs      Segmented % 58 %      Immature Grans % 0 %      Lymphocytes % 29 %      Monocytes % 8 %      Eosinophils Relative 4 %      Basophils Relative 1 %      Absolute Neutrophils 2.64 Thousands/µL      Absolute Immature Grans 0.01 Thousand/uL      Absolute Lymphocytes 1.33 Thousands/µL      Absolute Monocytes 0.35 Thousand/µL      Eosinophils Absolute 0.17 Thousand/µL      Basophils Absolute 0.05 Thousands/µL     POCT pregnancy, urine [[id number]]  (Normal) Resulted: 06/03/24 0856    Lab Status: Final result Updated: 06/03/24 0856     EXT Preg Test, Ur Negative     Control Valid                   CT abdomen pelvis wo contrast   Final Result by James Souza MD (06/03 1122)      Trace free fluid in the cul-de-sac within physiologic limits. No other evidence of acute abdominopelvic process.      No radiopaque urinary tract calculi or obstructive uropathy.      Cholelithiasis.      Workstation performed: [id number]                    Procedures  Procedures         ED Course  ED Course as of 06/03/24 1739   Mon Jun 03, 2024   0953 Bacteria, UA(!): Moderate  Will discuss with patient   1018 Patient updated once more.  Discussed UA results.  She does have thicker white vaginal discharge which is atypical for her.  She denies risk of STI, however further discussion we decided to move forward with molecular vaginal panel and STI screening.  She does continue endorse significant discomfort and pressure in her pelvis in the low back, I discussed CT imaging with contrast versus without.  She remains very hesitant to use IV contrast, will move forward with    1127 CT abdomen pelvis wo contrast  IMPRESSION:     Trace free fluid in the cul-de-sac within physiologic limits. No other evidence of acute abdominopelvic process.     No radiopaque urinary tract calculi or obstructive uropathy.     Cholelithiasis.     1135 Patient updated on CT results                                SBIRT 20yo+      Flowsheet Row Most Recent Value   Initial Alcohol Screen: US AUDIT-C     1. How often do you have a drink containing alcohol? 0 Filed at: 06/03/2024 0805   2. How many drinks containing alcohol do you have on a typical day you are drinking?  0 Filed at: 06/03/2024 0805   3a. Male UNDER 65: How often do you have five or more drinks on one occasion? 0 Filed at: 06/03/2024 0805   3b. FEMALE Any Age, or MALE 65+: How often do you have 4 or more drinks on one occassion? 0 Filed at: 06/03/2024 0805   Audit-C Score 0 Filed at: 06/03/2024 0805   CHANDA: How many times in the past year have you...    Used an illegal drug or used a prescription medication for non-medical reasons? Never Filed at: 06/03/2024 0805                      Medical Decision Making  DDx including but not limited to: Lumbar strain, sciatica, herniated disc, UTI, pyelonephritis, ureteral stone, pelvic pathology    Labs, UA, CT imaging obtained to evaluate for UTI, ureteral stone, and/or pelvic pathology.  UA with moderate bacteria but no signs or symptoms of UTI.  No evidence of cystitis or pyelonephritis on CT imaging.  Patient does have different than normal vaginal discharge.  Will send off testing for STI and/or BV.  Patient's labs are reviewed and overall unremarkable.  CT imaging without acute intra-abdominal or bony abnormality.  Suspect her pelvic discomfort/pain is related to her history of adenomyosis and possible endometriosis/fibroids.  Will have her closely follow-up with gynecology for further evaluation.  Will start her on a regimen of Tylenol, NSAIDs, and as needed tizanidine for acute low back pain.  Will place referral for comprehensive spine.    Problems Addressed:  Acute bilateral low back pain with bilateral sciatica: acute illness or injury  Lower abdominal pain: acute illness or injury    Amount and/or Complexity of Data Reviewed  Labs: ordered. Decision-making details documented in ED  Course.  Radiology: ordered. Decision-making details documented in ED Course.    Risk  OTC drugs.  Prescription drug management.             Disposition  Final diagnoses:   Acute bilateral low back pain with bilateral sciatica   Lower abdominal pain     Time reflects when diagnosis was documented in both MDM as applicable and the Disposition within this note       Time User Action Codes Description Comment    6/3/2024 11:39 AM [email or web address] Add [M54.42,  M54.41] Acute bilateral low back pain with bilateral sciatica     6/3/2024 11:39 AM Betsy Cesar Add [R10.30] Lower abdominal pain           ED Disposition       ED Disposition   Discharge    Condition   Stable    Date/Time   Mon Gael 3, 2024 1127    Comment   Damir Shay discharge to home/self care.                   Follow-up Information       Follow up With Specialties Details Why Contact Info Additional Information    Candi Lozano DO Family Medicine Schedule an appointment as soon as possible for a visit in 1 week  [address] [address]  [phone]        [facility] Emergency Department Emergency Medicine Go to  If symptoms worsen [address] [facility] [address] [phone] [facility] Emergency Department, [address], [place] [address]            Discharge Medication List as of 6/3/2024 11:55 AM        START taking these medications    Details   acetaminophen (TYLENOL) 500 mg tablet Take 2 tablets (1,000 mg total) by mouth 3 (three) times a day as needed for mild pain or moderate pain for up to 5 days, Starting Mon 6/3/2024, Until Sat 6/8/2024 at 2359, Normal      ibuprofen (MOTRIN) 600 mg tablet Multiple Dosages:Starting Mon 6/3/2024, Until Wed 6/5/2024 at 2359, THEN Starting Thu 6/6/2024, Until Sat 6/8/2024 at 2359Take 1 tablet (600 mg total) by mouth 3 (three) times a day with meals for 3 days, THEN 1 tablet (600 mg total) 3  (three) times a  day as needed for mild pain for up to 3 days., Normal      TiZANidine (ZANAFLEX) 4 MG capsule Take 1 capsule (4 mg total) by mouth 3 (three) times a day as needed for muscle spasms for up to 5 days, Starting Mon 6/3/2024, Until Sat 6/8/2024 at 2359, Normal           CONTINUE these medications which have NOT CHANGED    Details   ferrous sulfate 324 (65 Fe) mg Take 1 tablet (324 mg total) by mouth every other day, Starting Wed 1/3/2024, Normal      Multiple Vitamins-Minerals (MULTIVITAMIN ADULT) TABS Take 1 tablet by mouth, Historical Med      Sodium Fluoride 5000 PPM 1.1 % PSTE As needed, Historical Med                 PDMP Review         Value Time User    PDMP Reviewed  Yes 4/18/2023  5:53 PM Candi Lozano DO            ED Provider  Electronically Signed by             SABAS Javier  06/03/24 5288

## 2024-06-03 NOTE — DISCHARGE INSTRUCTIONS
Start taking the prescribed ibuprofen (Motrin) 3 times daily with food for the next 3 days.  Use the prescribed high-dose acetaminophen (Tylenol) every 8 hours as needed for breakthrough pain.  Use prescribed tizanidine (Zanaflex) as needed for muscle spasms.  Consider topical therapy such as Bengay or Aspercreme or lidocaine 4% over-the-counter patches.  Use heat and warm shower to soothe your muscles.  Follow-up with your primary care provider for reevaluation in 1 to 2 weeks.  You will receive a call from the [facility] in 2 to 3 days to discuss further management.    Return to the ER if you pee or poop on yourself, have inability to move either leg, new severe pain, weakness, or fever.

## 2024-06-04 ENCOUNTER — TELEPHONE (OUTPATIENT)
Dept: PHYSICAL THERAPY | Facility: OTHER | Age: 39
End: 2024-06-04

## 2024-06-04 LAB
C GLABRATA DNA VAG QL NAA+PROBE: NEGATIVE
C KRUSEI DNA VAG QL NAA+PROBE: NEGATIVE
CANDIDA SP 6 PNL VAG NAA+PROBE: NEGATIVE
T VAGINALIS DNA VAG QL NAA+PROBE: NEGATIVE
VAGINOSIS/ITIS DNA PNL VAG PROBE+SIG AMP: NEGATIVE

## 2024-06-04 RX ORDER — TIZANIDINE 4 MG/1
4 TABLET ORAL EVERY 8 HOURS PRN
Qty: 15 TABLET | Refills: 0 | Status: SHIPPED | OUTPATIENT
Start: 2024-06-04

## 2024-06-05 ENCOUNTER — NURSE TRIAGE (OUTPATIENT)
Dept: PHYSICAL THERAPY | Facility: OTHER | Age: 39
End: 2024-06-05

## 2024-06-05 DIAGNOSIS — M54.41 ACUTE BILATERAL LOW BACK PAIN WITH BILATERAL SCIATICA: Primary | ICD-10-CM

## 2024-06-05 DIAGNOSIS — M54.42 ACUTE BILATERAL LOW BACK PAIN WITH BILATERAL SCIATICA: Primary | ICD-10-CM

## 2024-06-05 NOTE — TELEPHONE ENCOUNTER
Called the patient to complete the triage process started today @12:55 pm. Call went to .    Voice message left for patient to call back. Phone number and hours of business provided.     Referral Closed.  Triage will be completed if a call back is received.

## 2024-06-05 NOTE — TELEPHONE ENCOUNTER
Additional Information   Negative: Has the patient had unexplained weight loss?   Negative: Does the patient have a fever?   Negative: Is the patient experiencing urine retention?   Negative: Is the patient experiencing acute drop foot or paralysis?   Negative: Is this a chronic condition?   Negative: Has the patient experienced major trauma? (fall from height, high speed collision, direct blow to spine) and is also experiencing nausea, light-headedness, or loss of consciousness?   Negative: Is the patient experiencing blood in sputum?    Protocols used: [facility] Protocol    [facility] was reviewed in detail and what we can provide for their back pain.  Patient is agreeable to being triaged and would like to proceed with Physical Therapy.    Referral was placed for Physical Therapy at the [facility]. Patients information was sent to the  to make evaluation appointment. Patient made aware that the PT office  will be calling to schedule the appointment.  Patient was provided with the phone number to the PT office.    No further questions and/or concerns were voiced by the patient at this time. Patient states understanding of the referral that was placed.

## 2024-06-05 NOTE — TELEPHONE ENCOUNTER
"Additional Information   Negative: Is this related to a work injury?   Negative: Is this related to an MVA?   Negative: Are you currently recieving homecare services?    Background - Initial Assessment  Clinical complaint: ED visit on 06/03 due to B/L Lower Back pain with B/L Sciatica that started last week Tuesday 05/29. No previous hx of back pain. Patient states pain radiates into her hip, pelvic area and down to her upper legs thigh area. Numbness and tingling \"a little bit\" in both thighs , legs. NKI. Not seeing a Dr for this pain. She has an appt to see her PCP on Monday. Patient states pain is constant but has some relief \"here and there\". Patient described pain as aching and stabbing.  Date of onset: Tuesday 05/29/24  Frequency of pain: constant  Quality of pain: aching, numb, stabbing, and tingling.    Protocols used: [facility] Protocol    "

## 2024-06-10 ENCOUNTER — OFFICE VISIT (OUTPATIENT)
Dept: FAMILY MEDICINE CLINIC | Facility: CLINIC | Age: 39
End: 2024-06-10
Payer: COMMERCIAL

## 2024-06-10 VITALS
HEART RATE: 70 BPM | SYSTOLIC BLOOD PRESSURE: 114 MMHG | RESPIRATION RATE: 16 BRPM | TEMPERATURE: 100 F | DIASTOLIC BLOOD PRESSURE: 70 MMHG | OXYGEN SATURATION: 99 % | HEIGHT: 64 IN | WEIGHT: 150.4 LBS | BODY MASS INDEX: 25.68 KG/M2

## 2024-06-10 DIAGNOSIS — N80.03 ADENOMYOSIS OF THE UTERUS: ICD-10-CM

## 2024-06-10 DIAGNOSIS — S39.012A LOW BACK STRAIN, INITIAL ENCOUNTER: ICD-10-CM

## 2024-06-10 DIAGNOSIS — K80.20 CALCULUS OF GALLBLADDER WITHOUT CHOLECYSTITIS WITHOUT OBSTRUCTION: Primary | ICD-10-CM

## 2024-06-10 DIAGNOSIS — K29.30 CHRONIC SUPERFICIAL GASTRITIS WITHOUT BLEEDING: ICD-10-CM

## 2024-06-10 PROCEDURE — 99214 OFFICE O/P EST MOD 30 MIN: CPT | Performed by: FAMILY MEDICINE

## 2024-06-10 RX ORDER — OMEPRAZOLE 20 MG/1
20 CAPSULE, DELAYED RELEASE ORAL DAILY
Qty: 30 CAPSULE | Refills: 5 | Status: SHIPPED | OUTPATIENT
Start: 2024-06-10

## 2024-06-10 NOTE — PROGRESS NOTES
Ambulatory Visit  Name: Damir Shay      : 1985      MRN: [id number]  Encounter Provider: Candi Lozano DO  Encounter Date: 6/10/2024   Encounter department: [facility]    Assessment & Plan   1. Calculus of gallbladder without cholecystitis without obstruction  Assessment & Plan:  We will send the patient for the ultrasound of the abdomen for further evaluation of the gallbladder and gallstones.  She will also go for the lab work as indicated and we will follow-up with the results.  I also have referred her to general surgery for further evaluation and discussion about possible cholecystectomy.  We will see the patient back as scheduled.  Orders:  -     US abdomen complete; Future; Expected date: 06/10/2024  -     CBC and differential; Future  -     Basic metabolic panel; Future  -     Amylase; Future  -     Lipase; Future  -     Hepatic function panel; Future  -     Ambulatory Referral to General Surgery; Future  -     CBC and differential  -     Basic metabolic panel  -     Amylase  -     Lipase  -     Hepatic function panel  2. Low back strain, initial encounter  Assessment & Plan:  The patient has seen some improvement in her back pain since the ER visit.  She will follow-up with physical therapy as scheduled.  She will alternate between ice and heat to her back as well as continuing with stretching.  We will monitor her closely.  3. Adenomyosis of the uterus  -     Ambulatory Referral to Obstetrics / Gynecology; Future  4. Chronic superficial gastritis without bleeding  Assessment & Plan:  The patient has symptoms consistent with some mild gastritis.  We will start her on the omeprazole as indicated and we will see her back as scheduled.  Orders:  -     omeprazole (PriLOSEC) 20 mg delayed release capsule; Take 1 capsule (20 mg total) by mouth daily    Depression Screening and Follow-up Plan: Patient was screened for depression during today's encounter. They screened  negative with a PHQ-2 score of 0.    The patient will go for the testing as indicated and was instructed to follow-up in the ER with any worsening pain.    History of Present Illness     Damir Shay is a 39 y.o. female who presents today for follow-up from a recent ER visit at [facility] on Leidy 3, 2024 where she was evaluated for low back pain.  She had a full workup including a CT scan that was unremarkable as well as a urinalysis.  She had woke up with the pain the day of the ER visit and it was severe radiating to the abdomen and pelvis and she went to the ER.  She was doing a lot of yard work prior to this.    She had evidence of gallstones without cholecystitis seen in her gallbladder.  She was referred to PT- she has an appointment scheduled for next week.  She has been alternate between ice and heat to her back and resting.  She also has had some burning in her stomach and some discomfort.    She had pain on the right side yesterday that felt lie a side sticker- she had pain after eating a cupcake- the pain was severe.  There is still pain on the right side today- there is pain there on and off.      She has not eaten today.  There is a low grade fever.    There is no nausea, vomiting, or diarrhea.    She is on medication for chronic gastritis.    There are no attacks with this.      The back is better          Abdominal Pain  This is a new problem. The current episode started in the past 7 days. The onset quality is sudden. The problem occurs intermittently. The problem has been unchanged. The pain is located in the RUQ. The pain is at a severity of 6/10. The quality of the pain is colicky, cramping and sharp. The abdominal pain does not radiate. Associated symptoms include a fever. Pertinent negatives include no anorexia, arthralgias, belching, constipation, diarrhea, dysuria, flatus, frequency, headaches, hematochezia, hematuria, melena, myalgias, nausea, vomiting or weight loss.      Review of Systems   Constitutional:  Positive for fever. Negative for weight loss.   HENT: Negative.     Eyes: Negative.    Respiratory: Negative.     Cardiovascular: Negative.  Negative for chest pain, palpitations and leg swelling.   Gastrointestinal:  Positive for abdominal pain. Negative for abdominal distention, anal bleeding, anorexia, blood in stool, constipation, diarrhea, flatus, hematochezia, melena, nausea and vomiting.   Endocrine: Negative.    Genitourinary: Negative.  Negative for dysuria, frequency and hematuria.   Musculoskeletal: Negative.  Negative for arthralgias and myalgias.   Skin: Negative.    Allergic/Immunologic: Negative.    Neurological: Negative.  Negative for headaches.   Hematological: Negative.    Psychiatric/Behavioral: Negative.       Past Medical History:   Diagnosis Date   • Abnormal weight gain 10/30/2017   • Cholelithiasis    • COVID 10/10/2021   • Elevated cortisol level 10/28/2021   • Eustachian tube dysfunction, bilateral 10/28/2021   • IBS (irritable bowel syndrome)    • Inflammatory bowel disease    • Iron deficiency anemia    • Menorrhagia    • Overweight (BMI 25.0-29.9) 2022   • Palpitations      Past Surgical History:   Procedure Laterality Date   • DILATION AND CURETTAGE OF UTERUS     • WISDOM TOOTH EXTRACTION       Family History   Problem Relation Age of Onset   • Heart failure Mother    • Colon polyps Mother    • Hypothyroidism Sister      Social History     Tobacco Use   • Smoking status: Former     Current packs/day: 0.00     Average packs/day: 1 pack/day for 5.0 years (5.0 ttl pk-yrs)     Types: Cigarettes     Start date: 2013     Quit date: 2018     Years since quittin.4     Passive exposure: Past   • Smokeless tobacco: Never   Vaping Use   • Vaping status: Former   • Quit date: 2018   Substance and Sexual Activity   • Alcohol use: Yes     Alcohol/week: 2.0 standard drinks of alcohol     Types: 2 Standard drinks or equivalent per  "week     Comment: socially   • Drug use: Yes     Frequency: 7.0 times per week     Types: Marijuana     Comment: medical marijuana patient   • Sexual activity: Yes     Partners: Male     Birth control/protection: Condom Male     Current Outpatient Medications on File Prior to Visit   Medication Sig   • Multiple Vitamins-Minerals (MULTIVITAMIN ADULT) TABS Take 1 tablet by mouth   • Sodium Fluoride 5000 PPM 1.1 % PSTE As needed     No Known Allergies    There is no immunization history on file for this patient.  Objective     /70 (BP Location: Right arm, Patient Position: Sitting, Cuff Size: Standard)   Pulse 70   Temp 100 °F (37.8 °C) (Tympanic)   Resp 16   Ht 5' 4\" (1.626 m)   Wt 68.2 kg (150 lb 6.4 oz)   LMP 05/11/2024   SpO2 99%   BMI 25.82 kg/m²     Physical Exam  Constitutional:       General: She is not in acute distress.     Appearance: Normal appearance. She is well-developed. She is not diaphoretic.   HENT:      Head: Normocephalic and atraumatic.      Right Ear: Tympanic membrane, ear canal and external ear normal.      Left Ear: Tympanic membrane, ear canal and external ear normal.      Nose: Nose normal.      Mouth/Throat:      Mouth: Mucous membranes are moist.      Pharynx: Oropharynx is clear. No oropharyngeal exudate.   Eyes:      General: No scleral icterus.        Right eye: No discharge.         Left eye: No discharge.      Extraocular Movements: Extraocular movements intact.      Pupils: Pupils are equal, round, and reactive to light.   Neck:      Thyroid: No thyromegaly.      Vascular: No JVD.      Trachea: No tracheal deviation.   Cardiovascular:      Rate and Rhythm: Normal rate and regular rhythm.      Heart sounds: Normal heart sounds. No murmur heard.     No friction rub. No gallop.   Pulmonary:      Effort: Pulmonary effort is normal. No respiratory distress.      Breath sounds: Normal breath sounds. No wheezing or rales.   Chest:      Chest wall: No tenderness.   Abdominal: "      General: Bowel sounds are normal. There is no distension.      Palpations: Abdomen is soft. There is no mass.      Tenderness: There is no abdominal tenderness. There is no guarding or rebound.      Hernia: No hernia is present.   Musculoskeletal:         General: No tenderness or deformity. Normal range of motion.      Cervical back: Normal range of motion and neck supple.   Lymphadenopathy:      Cervical: No cervical adenopathy.   Skin:     General: Skin is warm and dry.      Coloration: Skin is not pale.      Findings: No erythema or rash.   Neurological:      Mental Status: She is alert and oriented to person, place, and time.      Cranial Nerves: No cranial nerve deficit.      Sensory: No sensory deficit.      Motor: No abnormal muscle tone.      Coordination: Coordination normal.      Deep Tendon Reflexes: Reflexes normal.   Psychiatric:         Mood and Affect: Mood normal.         Behavior: Behavior normal.         Thought Content: Thought content normal.         Judgment: Judgment normal.       Administrative Statements   I have spent a total time of 20 minutes on 06/12/24 In caring for this patient including Diagnostic results, Prognosis, Risks and benefits of tx options, Risk factor reductions, Impressions, Counseling / Coordination of care, Documenting in the medical record, and Reviewing / ordering tests, medicine, procedures  .

## 2024-06-12 PROBLEM — S39.012A LOW BACK STRAIN, INITIAL ENCOUNTER: Status: ACTIVE | Noted: 2024-06-12

## 2024-06-12 NOTE — ASSESSMENT & PLAN NOTE
The patient has seen some improvement in her back pain since the ER visit.  She will follow-up with physical therapy as scheduled.  She will alternate between ice and heat to her back as well as continuing with stretching.  We will monitor her closely.

## 2024-06-12 NOTE — ASSESSMENT & PLAN NOTE
We will send the patient for the ultrasound of the abdomen for further evaluation of the gallbladder and gallstones.  She will also go for the lab work as indicated and we will follow-up with the results.  I also have referred her to general surgery for further evaluation and discussion about possible cholecystectomy.  We will see the patient back as scheduled.

## 2024-06-12 NOTE — ASSESSMENT & PLAN NOTE
May 23, 2019      Trevor Alexandre  [address]  [facility] [address]        Dear Irving,    Below are the results of your recent labs:    Results for orders placed or performed in visit on 05/20/19   MRSA Culture   Result Value Ref Range    Specimen Description Nose     Culture Micro No MRSA isolated         Your culture has returned, no MRSA has been found.  We will get that notification off of your chart.    Sincerely,        Landen Gavin MD  Internal Medicine  [facility]                   The patient has symptoms consistent with some mild gastritis.  We will start her on the omeprazole as indicated and we will see her back as scheduled.

## 2024-06-18 LAB
BASOPHILS # BLD AUTO: 0 X10E3/UL (ref 0–0.2)
BASOPHILS NFR BLD AUTO: 1 %
EOSINOPHIL # BLD AUTO: 0.1 X10E3/UL (ref 0–0.4)
EOSINOPHIL NFR BLD AUTO: 2 %
ERYTHROCYTE [DISTWIDTH] IN BLOOD BY AUTOMATED COUNT: 12.9 % (ref 11.7–15.4)
HCT VFR BLD AUTO: 39.4 % (ref 34–46.6)
HGB BLD-MCNC: 13 G/DL (ref 11.1–15.9)
IMM GRANULOCYTES # BLD: 0 X10E3/UL (ref 0–0.1)
IMM GRANULOCYTES NFR BLD: 0 %
LYMPHOCYTES # BLD AUTO: 1.4 X10E3/UL (ref 0.7–3.1)
LYMPHOCYTES NFR BLD AUTO: 31 %
MCH RBC QN AUTO: 31.6 PG (ref 26.6–33)
MCHC RBC AUTO-ENTMCNC: 33 G/DL (ref 31.5–35.7)
MCV RBC AUTO: 96 FL (ref 79–97)
MONOCYTES # BLD AUTO: 0.3 X10E3/UL (ref 0.1–0.9)
MONOCYTES NFR BLD AUTO: 6 %
NEUTROPHILS # BLD AUTO: 2.7 X10E3/UL (ref 1.4–7)
NEUTROPHILS NFR BLD AUTO: 60 %
PLATELET # BLD AUTO: 290 X10E3/UL (ref 150–450)
RBC # BLD AUTO: 4.12 X10E6/UL (ref 3.77–5.28)
WBC # BLD AUTO: 4.5 X10E3/UL (ref 3.4–10.8)

## 2024-06-19 LAB
25(OH)D3+25(OH)D2 SERPL-MCNC: 62.5 NG/ML (ref 30–100)
ALBUMIN SERPL-MCNC: 4.5 G/DL (ref 3.9–4.9)
ALBUMIN SERPL-MCNC: 4.6 G/DL (ref 3.9–4.9)
ALP SERPL-CCNC: 45 IU/L (ref 44–121)
ALP SERPL-CCNC: 46 IU/L (ref 44–121)
ALT SERPL-CCNC: 12 IU/L (ref 0–32)
ALT SERPL-CCNC: 8 IU/L (ref 0–32)
AMYLASE SERPL-CCNC: 64 U/L (ref 31–110)
AST SERPL-CCNC: 16 IU/L (ref 0–40)
AST SERPL-CCNC: 18 IU/L (ref 0–40)
BASOPHILS # BLD AUTO: 0 X10E3/UL (ref 0–0.2)
BASOPHILS NFR BLD AUTO: 1 %
BILIRUB DIRECT SERPL-MCNC: 0.14 MG/DL (ref 0–0.4)
BILIRUB SERPL-MCNC: 0.5 MG/DL (ref 0–1.2)
BILIRUB SERPL-MCNC: 0.5 MG/DL (ref 0–1.2)
BUN SERPL-MCNC: 9 MG/DL (ref 6–20)
BUN SERPL-MCNC: 9 MG/DL (ref 6–20)
BUN/CREAT SERPL: 12 (ref 9–23)
BUN/CREAT SERPL: 13 (ref 9–23)
CALCIUM SERPL-MCNC: 9.3 MG/DL (ref 8.7–10.2)
CALCIUM SERPL-MCNC: 9.4 MG/DL (ref 8.7–10.2)
CHLORIDE SERPL-SCNC: 103 MMOL/L (ref 96–106)
CHLORIDE SERPL-SCNC: 104 MMOL/L (ref 96–106)
CO2 SERPL-SCNC: 23 MMOL/L (ref 20–29)
CO2 SERPL-SCNC: 24 MMOL/L (ref 20–29)
CREAT SERPL-MCNC: 0.68 MG/DL (ref 0.57–1)
CREAT SERPL-MCNC: 0.73 MG/DL (ref 0.57–1)
EGFR: 107 ML/MIN/1.73
EGFR: 114 ML/MIN/1.73
EOSINOPHIL # BLD AUTO: 0.1 X10E3/UL (ref 0–0.4)
EOSINOPHIL NFR BLD AUTO: 2 %
ERYTHROCYTE [DISTWIDTH] IN BLOOD BY AUTOMATED COUNT: 12.9 % (ref 11.7–15.4)
GLOBULIN SER-MCNC: 2 G/DL (ref 1.5–4.5)
GLUCOSE SERPL-MCNC: 95 MG/DL (ref 70–99)
GLUCOSE SERPL-MCNC: 97 MG/DL (ref 70–99)
HCT VFR BLD AUTO: 39.7 % (ref 34–46.6)
HGB BLD-MCNC: 13 G/DL (ref 11.1–15.9)
IMM GRANULOCYTES # BLD: 0 X10E3/UL (ref 0–0.1)
IMM GRANULOCYTES NFR BLD: 0 %
LIPASE SERPL-CCNC: 35 U/L (ref 14–72)
LYMPHOCYTES # BLD AUTO: 1.4 X10E3/UL (ref 0.7–3.1)
LYMPHOCYTES NFR BLD AUTO: 31 %
MCH RBC QN AUTO: 31.5 PG (ref 26.6–33)
MCHC RBC AUTO-ENTMCNC: 32.7 G/DL (ref 31.5–35.7)
MCV RBC AUTO: 96 FL (ref 79–97)
MONOCYTES # BLD AUTO: 0.3 X10E3/UL (ref 0.1–0.9)
MONOCYTES NFR BLD AUTO: 6 %
NEUTROPHILS # BLD AUTO: 2.7 X10E3/UL (ref 1.4–7)
NEUTROPHILS NFR BLD AUTO: 60 %
PLATELET # BLD AUTO: 287 X10E3/UL (ref 150–450)
POTASSIUM SERPL-SCNC: 4.4 MMOL/L (ref 3.5–5.2)
POTASSIUM SERPL-SCNC: 4.7 MMOL/L (ref 3.5–5.2)
PROT SERPL-MCNC: 6.6 G/DL (ref 6–8.5)
PROT SERPL-MCNC: 6.7 G/DL (ref 6–8.5)
RBC # BLD AUTO: 4.13 X10E6/UL (ref 3.77–5.28)
SODIUM SERPL-SCNC: 140 MMOL/L (ref 134–144)
SODIUM SERPL-SCNC: 141 MMOL/L (ref 134–144)
VIT B12 SERPL-MCNC: 698 PG/ML (ref 232–1245)
WBC # BLD AUTO: 4.5 X10E3/UL (ref 3.4–10.8)

## 2024-06-20 DIAGNOSIS — D50.0 IRON DEFICIENCY ANEMIA DUE TO CHRONIC BLOOD LOSS: Primary | ICD-10-CM

## 2024-06-24 ENCOUNTER — EVALUATION (OUTPATIENT)
Dept: PHYSICAL THERAPY | Facility: CLINIC | Age: 39
End: 2024-06-24
Payer: COMMERCIAL

## 2024-06-24 VITALS — DIASTOLIC BLOOD PRESSURE: 70 MMHG | SYSTOLIC BLOOD PRESSURE: 110 MMHG | HEART RATE: 74 BPM | OXYGEN SATURATION: 97 %

## 2024-06-24 DIAGNOSIS — M54.42 ACUTE BILATERAL LOW BACK PAIN WITH BILATERAL SCIATICA: ICD-10-CM

## 2024-06-24 DIAGNOSIS — M54.41 ACUTE BILATERAL LOW BACK PAIN WITH BILATERAL SCIATICA: ICD-10-CM

## 2024-06-24 PROCEDURE — 97162 PT EVAL MOD COMPLEX 30 MIN: CPT | Performed by: PHYSICAL THERAPIST

## 2024-06-24 PROCEDURE — 97110 THERAPEUTIC EXERCISES: CPT | Performed by: PHYSICAL THERAPIST

## 2024-06-24 NOTE — PROGRESS NOTES
PT Evaluation     Today's date: 2024  Patient name: Damir Shay  : 1985  MRN: [id number]  Referring provider: Masoud Arvizu PT  Dx:   Encounter Diagnosis     ICD-10-CM    1. Acute bilateral low back pain with bilateral sciatica  M54.42 Ambulatory referral to PT spine    M54.41                      Assessment  Impairments: abnormal muscle tone, abnormal or restricted ROM, impaired physical strength, lacks appropriate home exercise program, pain with function, poor posture  and poor body mechanics  Symptom irritability: moderate    Assessment details: Patient is a 39 y.o. female who presents to therapy via comprehensive spine program. Patient's presentation most closely fits into mobilization category of treatment with impairments like decreased joint mobility of lumbar spine, decreased ROM, soft tissue restrictions along paraspinals, and core muscle weakness/decreased motor control. These impairments limit patient with sleeping, bending associated with completing household chores, stair climbing for household access, performing yardwork, and recreational exercise for overall health and wellness. Patient requires skilled PT to address impairments and improve function in home and community. Patient is in agreement with this plan and consented to treatment. Patient responded well to initial treatment with improved lumbar mobility following supine lumbopelvic thrust mobilization.     Understanding of Dx/Px/POC: good     Prognosis: good    Goals  Short term goals:  Patient is independent in home program to support plan of care and improve function. - 2 weeks  Patient demonstrates at least 75% lumbar extension AROM so she can walk and go food shopping with less pain. -2 weeks  Patient demonstrates proper body mechanics with bending and lifting so she can perform ADLs with less pain. - 2 weeks    Long term goals:  Patient demonstrates improvement in community participation with increase in FOTO score  by 15%. - 8 weeks  Patient demonstrates 100% lumbar rotation AROM so she can roll in bed without low back pain for improved quality of life. - 8 weeks  Patient demonstrates 5/5 core strength so she can perform meal prep and perform household chores with less pain. - 8 weeks      Plan  Patient would benefit from: skilled physical therapy    Planned therapy interventions: abdominal trunk stabilization, activity modification, joint mobilization, manual therapy, massage, neuromuscular re-education, patient education, postural training, strengthening, stretching, body mechanics training, therapeutic exercise, flexibility, functional ROM exercises, home exercise program and IASTM    Frequency: 1-2x week  Duration in weeks: 8  Plan of Care beginning date: 6/24/2024  Plan of Care expiration date: 8/23/2024  Treatment plan discussed with: patient    Subjective Evaluation    History of Present Illness  Date of onset: 6/3/2024  Mechanism of injury: Patient presents to therapy via comprehensive spine program. Patient woke up with severe low back pain which led her to the ER approximately 3 weeks ago. Patient thinks pain could have been from yardwork the previous day involving lifting pavers, or intercourse. Patient reports that her low back pain is better than when at ER but is still there. Patient went to ER due to pain radiating into pelvis. Patient also notes having IBS and adenomyosis which can cause abdominal pain. 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     Symptoms: low back pain, radiating into b/l pelvis and hips. She had some paraesthesias into hip bones. Increased pain with increased activity, especially by the end of the day, with bending down, when lying on side, sitting without back support, going up/down stairs a lot. Patient does fitness hoop and yoga but not currently due to pain. Mild sleep disturbance if rolling in bed.    Patient denies sleep disturbance,  bowel bladder dysfunction, saddle parasthesias, unexplained weight loss, h/o of cancer    PMH: anxiety, PTSD, IBS, neck and upper back pain, medical caroline, anemia    Employment: not currently working      Patient Goals  Patient goals for therapy: decreased pain, increased strength, independence with ADLs/IADLs and return to sport/leisure activities    Pain  Current pain rating: 3  At best pain ratin  At worst pain rating: 10        Objective     Postural Observations  Seated posture: fair      Palpation   Left   Hypertonic in the lumbar paraspinals.     Right   Hypertonic in the lumbar paraspinals.     Neurological Testing     Sensation     Lumbar   Left   Intact: light touch    Right   Intact: light touch    Reflexes   Left   Patellar (L4): normal (2+)  Achilles (S1): normal (2+)    Right   Patellar (L4): normal (2+)  Achilles (S1): normal (2+)    Additional Neurological Details  Myotomes: WNL      Active Range of Motion     Additional Active Range of Motion Details    Lumbar AROM %  Flexion 90% pain  Extension 50% pain  R side bending 75%  L side bending 75%  R rotation 75%  L rotation 75%    Repeated extension in lying: NE in pain or AROM    Decreased lumbar segmental mobility seen during lumbar extension in standing and during press ups    Joint Play     Hypomobile: L1, L2, L3, L4 and L5     Pain: L3, L4 and L5     Muscle Activation     Additional Muscle Activation Details  Core muscle testing:  Posterior pelvic tilt able, pain  Transverse abdominus contraction able  Bridge: able, pain  Abdominal curl up: able, pain, mild diastasis recti seen  4/5 core strength/stability      Tests     Lumbar     Left   Negative passive SLR and slump test.     Right   Negative passive SLR and slump test.     Additional Tests Details  Symmetric leg length with assessed in supine with medial malleolus and b/l ASIS    General Comments:      Hip Comments   R hip PROM screen : WNL   L hip PROM screen : mild hip IR  restriction             Precautions: none. EPOC 8/23/24      Manuals 6/24                         Supine lumbopelvic thrust mob gr 5 SANA            P-a jt mob gr 3-4                          Neuro Re-Ed                          misty CARR brace             Posterior pelvic tilt             Brace with bridge                                       Ther Ex                          Patient edu on pathology, POC, overall favorable prognosis, avoidance of bed rest, HEP 8'            Piriformis stretch             Press ups                                                                 Ther Activity                                       Gait Training                                       Modalities

## 2024-06-26 ENCOUNTER — OFFICE VISIT (OUTPATIENT)
Age: 39
End: 2024-06-26
Payer: COMMERCIAL

## 2024-06-26 ENCOUNTER — OFFICE VISIT (OUTPATIENT)
Dept: PHYSICAL THERAPY | Facility: CLINIC | Age: 39
End: 2024-06-26
Payer: COMMERCIAL

## 2024-06-26 VITALS
OXYGEN SATURATION: 98 % | TEMPERATURE: 98 F | WEIGHT: 148 LBS | HEIGHT: 64 IN | HEART RATE: 61 BPM | DIASTOLIC BLOOD PRESSURE: 79 MMHG | RESPIRATION RATE: 16 BRPM | BODY MASS INDEX: 25.27 KG/M2 | SYSTOLIC BLOOD PRESSURE: 119 MMHG

## 2024-06-26 DIAGNOSIS — M54.41 ACUTE BILATERAL LOW BACK PAIN WITH BILATERAL SCIATICA: Primary | ICD-10-CM

## 2024-06-26 DIAGNOSIS — M54.42 ACUTE BILATERAL LOW BACK PAIN WITH BILATERAL SCIATICA: Primary | ICD-10-CM

## 2024-06-26 DIAGNOSIS — D50.0 IRON DEFICIENCY ANEMIA DUE TO CHRONIC BLOOD LOSS: Primary | ICD-10-CM

## 2024-06-26 LAB
GH SERPL-MCNC: 0.3 NG/ML (ref 0–10)
IGF-I SERPL-MCNC: 92 NG/ML

## 2024-06-26 PROCEDURE — 99214 OFFICE O/P EST MOD 30 MIN: CPT | Performed by: INTERNAL MEDICINE

## 2024-06-26 PROCEDURE — 97110 THERAPEUTIC EXERCISES: CPT | Performed by: PHYSICAL THERAPIST

## 2024-06-26 PROCEDURE — 97112 NEUROMUSCULAR REEDUCATION: CPT | Performed by: PHYSICAL THERAPIST

## 2024-06-26 PROCEDURE — 97140 MANUAL THERAPY 1/> REGIONS: CPT | Performed by: PHYSICAL THERAPIST

## 2024-06-26 PROCEDURE — 97010 HOT OR COLD PACKS THERAPY: CPT | Performed by: PHYSICAL THERAPIST

## 2024-06-26 NOTE — PROGRESS NOTES
HEMATOLOGY / ONCOLOGY CLINIC FOLLOW UP NOTE    Patient Damir Shay  MRN: [id number]  : 1985  Date of Encounter 2024      Referring Provider:      Reason for Encounter: follow up iron deficiency anemia        Oncology History    No history exists.             Discussion    ID is not a final diagnosis; rather, it is indicative of an underlying etiology that is decreasing iron availability and/or increasing iron needs To effectively manage ID/NAVARRO, the underlying etiology must be identified and, if possible, treated.  the picture below outlines a number of key causes of ID/NAVARRO. In a woman of reproductive age, vaginal blood loss must be considered and explored with a detailed bleeding history.  GI blood loss or malabsorptive states must be considered in all patients. Although it is especially important in men and in all patients older than age 50 years, it is important to remember that celiac disease  and GI malignancy  can occur in both sexes at any age. Blood donors are at risk of ID, and thus current or previous blood donation is an important element to obtain from the patient history  Additional considerations include detailed medication history (eg, oral anticoagulants, antacids, proton pump inhibitors) and dietary history.          Assessment / Plan:      Patient s a 39yF with Pmhx of IBS, Adenomyosis, exocrine insufficiency, PTSD, anxiety presents with several symptoms perimenopausal found to have ferritin 8 total iron 36, no % sat/TIBC done, Hgb 12 MCV 92 WBC 3.9        Iron Deficiency Anemia     Had long discussion about repleting iron stores with parenteral iron.  She has been on oral iron 325 mg every other day for 2 months with no change.  This is also a low dose and she has exocrine insufficiency and may not be absorbing iron in the face of continued iron loss.     Thus, 200 mg IV Venofer weekly for 6 weeks will be prescribed.  She will start within the week.  Tolerance is excellent with  minimal chance of infusion reaction as this is iron sucrose     Venofer AE s as follows:     >10%:  Cardiovascular: Hypotension (children and adolescents: 2%; adults: HDD-CKD: 39%, NDD/PDD-CKD: 2% to 3%)   Gastrointestinal: Nausea (children and adolescents: 3%; adults: 5% to 15%)  Nervous system: Headache (children and adolescents: 6%; adults: HDD-CKD: 13%, NDD/PDD-CKD: 3% to 4%)  Neuromuscular & skeletal: Muscle cramps (HDD-CKD: 29%, NDD/PDD-CKD: ?3%)  Respiratory: Nasopharyngitis (?16%) pharyngitis (?16%), sinusitis (?16%), upper respiratory tract infection (?16%)     CBC with diff and iron panel will be repeated about 8 weeks after completion     She is in agreement with this approach      6/26/2024    Iron studies done 20 June2024; total iron 79 ferritin 143 % sat 29 TIBC 269     Hgb 13 MCV 96 plts  290 WBC 4.5    Completed 6 doses Venofer  16th May 2024    Periods lighter    Repeat labs in 3 months      Perimenopausal Symptoms     Patient with  hot flashes, fatigue, weight gain; mother had early menopause. Patient has menorrhagia as well as adenomyosis.    Gyn following, have suggested hormonal manipulation which the patient does not want to do.  May consider Mirena as well although she feels, if the same pattern as her mother, periods will stop within the year.  However, hormonal workup is being done.     Agree with checking TFTs based on her age     Follow up     3 months          HPI:   3/27/2024     Damir Shay is a 39 y.o. year old female with history of IBS, PTSD, VitD def who presents today d/t concern for hormonal concerns. Patient reports she's been having issues with polydipsia, hot flashes and weight gain.   Patient had her A1C checked 12/23 was 5.3%, BG on CMP 94.  Patients last TFT 06/23 euthyroid     Patient reports having polyuria, polydipsia since last month. Denies any increased intake of caffeine, alcohol or energy drinks. Drinks only mildly caffenated beverages. Drinks water often since  reports is thirsty. Wakes up 3-4x at night to urinate     Does report has adenomyosis of uterus for which she is following OB soon.      Also reports having issues with exocrine pancreatic insufficiency dx based on having diarrhea for 1 month and prescribed creon but she didn't take these are diarrhea now resolved. Had normal IBC now.      Reports also having hot flashes mainly at night, more often now. Also reports initially lost weight when having diarrhea and now regained it back and gained 10 more lbs. Denies any wheezing.      Reports anxiety as well getting worse.      Reports menses are 4-8 days irregular but still cycling every month.      Has been seen by GI, had EGD and found to have gastritis     Was found to have Hgb 12 MCV 92 plts 257 WBC 3.9 total iron 36 ferritin 8 3/12/2024; last labs in Dec 2023 with ferritin 9 with no total iron done     She has been on low dose oral iron every other day with no improvement; dose is low but this adds to constipation and thus not taking it daily or bid.  Also unclear if using PPI/H2 blocker which changes the needed acid milieu for iron absorption.       She is here today regarding parenteral iron         Denies any recent illness/new medication use.  She has some SOB but not noticeable.  She is very fatigued.  She has no blood in stool/urine, does have heavy menses, no dysphagia, odynophagia, weight loss, B symptoms.  She is having hot flashes.       Family Hx- sister and mother have thyroid issues  Social Hx- uses marijuana, denies alcohol use anymore        Interval History:  6/26/2024    Ms Shay is doing very well.  She states her energy levels are markedly improved, no more headaches, feeling rested.  Her Hgb is 13 MCV 96 TIBC 269 total iron 79 ferritin 143 % sat 29 all increased from March with ferritin 8 and total iron 36.  She states her periods are lighter as well which will help hold onto iron.  Overall she has no issues.      REVIEW OF SYSTEMS:  As  above   Please note that a 14-point review of systems was performed to include Constitutional, HEENT, Respiratory, CVS, GI, , Musculoskeletal, Integumentary, Neurologic, Rheumatologic, Endocrinologic, Psychiatric, Lymphatic, and Hematologic/Oncologic systems were reviewed and are negative unless otherwise stated in HPI. Positive and negative findings pertinent to this evaluation are incorporated into the history of present illness.      ECOG PS: 0    PROBLEM LIST:  Patient Active Problem List   Diagnosis    Cholelithiasis without obstruction    Chronic constipation    Hypothyroidism due to acquired atrophy of thyroid    Iron deficiency anemia due to chronic blood loss    Pulmonary nodule seen on imaging study    Vitamin D deficiency    PTSD (post-traumatic stress disorder)    Medical marijuana use    Irritable bowel syndrome with diarrhea    Encounter for weight management    Epigastric pain    Anxiety    Adenomyosis of the uterus    Chronic superficial gastritis without bleeding    Low back strain, initial encounter       Past Medical History:   has a past medical history of Abnormal weight gain (10/30/2017), Cholelithiasis, COVID (10/10/2021), Elevated cortisol level (10/28/2021), Eustachian tube dysfunction, bilateral (10/28/2021), IBS (irritable bowel syndrome), Inflammatory bowel disease, Iron deficiency anemia, Menorrhagia, Overweight (BMI 25.0-29.9) (08/06/2022), and Palpitations.    PAST SURGICAL HISTORY:   has a past surgical history that includes [place] tooth extraction and Dilation and curettage of uterus.    CURRENT MEDICATIONS  Current Outpatient Medications   Medication Sig Dispense Refill    Multiple Vitamins-Minerals (MULTIVITAMIN ADULT) TABS Take 1 tablet by mouth      omeprazole (PriLOSEC) 20 mg delayed release capsule Take 1 capsule (20 mg total) by mouth daily 30 capsule 5    Sodium Fluoride 5000 PPM 1.1 % PSTE As needed       No current facility-administered medications for this visit.      [unfilled]    SOCIAL HISTORY:   reports that she quit smoking about 5 years ago. Her smoking use included cigarettes. She started smoking about 10 years ago. She has a 5 pack-year smoking history. She has been exposed to tobacco smoke. She has never used smokeless tobacco. She reports current alcohol use of about 2.0 standard drinks of alcohol per week. She reports current drug use. Frequency: 7.00 times per week. Drug: Marijuana.     FAMILY HISTORY:  family history includes Colon polyps in her mother; Heart failure in her mother; Hypothyroidism in her sister.     ALLERGIES:  has No Known Allergies.      Physical Exam:  Vital Signs:   Visit Vitals  LMP 05/11/2024   OB Status Having periods   Smoking Status Former     There is no height or weight on file to calculate BMI.  There is no height or weight on file to calculate BSA.    GEN: Alert, awake oriented x3, in no acute distress  HEENT- No pallor, icterus, cyanosis, no oral mucosal lesions,   LAD - no palpable cervical, clavicle, axillary, inguinal LAD  Heart- normal S1 S2, regular rate and rhythm, No murmur, rubs.   Lungs- clear breathing sound bilateral.   Abdomen- soft, Non tender, bowel sounds present  Extremities- No cyanosis, clubbing, edema  Neuro- No focal neurological deficit    Labs:  Lab Results   Component Value Date    WBC 4.5 06/18/2024    HGB 13.0 06/18/2024    HCT 39.4 06/18/2024    MCV 96 06/18/2024     06/18/2024     Lab Results   Component Value Date     09/12/2017    SODIUM 140 06/18/2024    K 4.4 06/18/2024     06/18/2024    CO2 24 06/18/2024    AGAP 4 06/03/2024    BUN 9 06/18/2024    CREATININE 0.73 06/18/2024    GLUC 95 06/18/2024    CALCIUM 9.1 06/03/2024    AST 16 06/18/2024    ALT 12 06/18/2024    ALKPHOS 34 06/03/2024    PROT 6.4 09/12/2017    TP 6.6 06/18/2024    BILITOT 0.6 09/12/2017    TBILI 0.5 06/18/2024    EGFR 107 06/18/2024        Latest Reference Range & Units 06/18/24 10:57   Sodium 134 - 144 mmol/L 140    Potassium 3.5 - 5.2 mmol/L 4.4   Chloride 96 - 106 mmol/L 103   Carbon Dioxide 20 - 29 mmol/L 24   BUN 6 - 20 mg/dL 9   Creatinine 0.57 - 1.00 mg/dL 0.73   SL AMB BUN/CREATININE RATIO 9 - 23  12   GLUCOSE 70 - 99 mg/dL 95   AST 0 - 40 IU/L 16   ALT 0 - 32 IU/L 12   Total Protein 6.0 - 8.5 g/dL 6.6   Albumin 3.9 - 4.9 g/dL 4.6   Total Bilirubin 0.0 - 1.2 mg/dL 0.5   GFR, Calculated >59 mL/min/1.73 107   ALKALINE PHOSPHATASE ISOENZYMES 44 - 121 IU/L 46   25-Hydroxy, Vitamin D 30.0 - 100.0 ng/mL 62.5   Vitamin B-12 232 - 1,245 pg/mL 698   Globulin, Total 1.5 - 4.5 g/dL 2.0   Lymphocytes (Absolute) 0.7 - 3.1 x10E3/uL 1.4   CALCIUM 8.7 - 10.2 mg/dL 9.4   White Blood Cell Count 3.4 - 10.8 x10E3/uL 4.5   Red Blood Cell Count 3.77 - 5.28 x10E6/uL 4.12   Hemoglobin 11.1 - 15.9 g/dL 13.0   HCT 34.0 - 46.6 % 39.4   MCV 79 - 97 fL 96   MCH 26.6 - 33.0 pg 31.6   MCHC. 31.5 - 35.7 g/dL 33.0   RDW 11.7 - 15.4 % 12.9   Platelet Count 150 - 450 x10E3/uL 290   Neutrophils Not Estab. % 60   Lymphocytes Not Estab. % 31   Monocytes Not Estab. % 6   Eosinophils Not Estab. % 2   Basophils PCT Not Estab. % 1   Neutrophils (Absolute) 1.4 - 7.0 x10E3/uL 2.7   Monocytes (Absolute) 0.1 - 0.9 x10E3/uL 0.3   Eosinophils (Absolute) 0.0 - 0.4 x10E3/uL 0.1   Basophils ABS 0.0 - 0.2 x10E3/uL 0.0   Immature Granulocytes Not Estab. % 0       I spent 30 minutes on chart review, face to face counseling time, coordination of care and documentation.    Lien Morales MD PhD

## 2024-06-26 NOTE — PROGRESS NOTES
"Daily Note     Today's date: 2024  Patient name: Damir Shay  : 1985  MRN: [id number]  Referring provider: Masoud Arvizu, ERIC  Dx:   Encounter Diagnosis     ICD-10-CM    1. Acute bilateral low back pain with bilateral sciatica  M54.42     M54.41                      Subjective: patient reports some soreness following evaluation but feels okay today, no pain at present. She has been doing press ups and trying to perform with greater lordosis per instructions.      Objective: See treatment diary below    Lumbar AROM %  Flexion 95% no pain  Extension 75% no pain  R side bending 100%  L side bending 75%  R rotation 75%  L rotation 100%    Assessment: Tolerated treatment well. Patient presented to therapy with improved lumbar AROM compared to evaluation, especially with extension segmental mobility. Performed supine lumbopelvic thrust mobilization again which she tolerated well. Patient also presented with less pain and improved joint mobiltiy in prone with p-a assessment of lumbar spine. Initiated core stabilization training; patient required cuing for correct posture and muscle facilitation. Patient would benefit from continued skilled PT per plan of care to address impairments and improve function.       Plan: Continue per plan of care.      Precautions: none. EPOC 24      Manuals                         Supine lumbopelvic thrust mob gr 5 SANA SANA           P-a jt mob gr 3-4  SANA assessed                        Neuro Re-Ed             Quadruped UE lift  5x           Quadruped LE lift  5x           kegel  10x           TA brace with exhale  10x           Posterior pelvic tilt             Brace with bridge  nv           Brace with adductor squeeze  10x           Cat camel  10x           Ther Ex                          Patient edu on pathology, POC, overall favorable prognosis, avoidance of bed rest, HEP 8' 5'           Piriformis stretch  3x30\"           Press ups  2x10           Supine " "hip flexor stretch  5x10\"           Lumbar trunk rotation  5x10\"                                     Ther Activity                                       Gait Training                                       Modalities             Moist hot pack end  8'                             "

## 2024-06-27 ENCOUNTER — HOSPITAL ENCOUNTER (OUTPATIENT)
Dept: ULTRASOUND IMAGING | Facility: CLINIC | Age: 39
Discharge: HOME | End: 2024-06-27
Payer: COMMERCIAL

## 2024-06-27 DIAGNOSIS — K80.20 CALCULUS OF GALLBLADDER WITHOUT CHOLECYSTITIS WITHOUT OBSTRUCTION: ICD-10-CM

## 2024-06-27 PROCEDURE — 76700 US EXAM ABDOM COMPLETE: CPT

## 2024-07-01 ENCOUNTER — APPOINTMENT (OUTPATIENT)
Dept: PHYSICAL THERAPY | Facility: CLINIC | Age: 39
End: 2024-07-01
Payer: COMMERCIAL

## 2024-07-01 ENCOUNTER — OFFICE VISIT (OUTPATIENT)
Dept: PHYSICAL THERAPY | Facility: CLINIC | Age: 39
End: 2024-07-01
Payer: COMMERCIAL

## 2024-07-01 DIAGNOSIS — M54.41 ACUTE BILATERAL LOW BACK PAIN WITH BILATERAL SCIATICA: Primary | ICD-10-CM

## 2024-07-01 DIAGNOSIS — M54.42 ACUTE BILATERAL LOW BACK PAIN WITH BILATERAL SCIATICA: Primary | ICD-10-CM

## 2024-07-01 PROCEDURE — 97140 MANUAL THERAPY 1/> REGIONS: CPT | Performed by: PHYSICAL THERAPIST

## 2024-07-01 PROCEDURE — 97112 NEUROMUSCULAR REEDUCATION: CPT | Performed by: PHYSICAL THERAPIST

## 2024-07-01 PROCEDURE — 97110 THERAPEUTIC EXERCISES: CPT | Performed by: PHYSICAL THERAPIST

## 2024-07-01 NOTE — PROGRESS NOTES
Daily Note     Today's date: 2024  Patient name: Damir Shay  : 1985  MRN: [id number]  Referring provider: Masoud Arvizu, PT  Dx:   Encounter Diagnosis     ICD-10-CM    1. Acute bilateral low back pain with bilateral sciatica  M54.42     M54.41                      Subjective: patient reports feeling similar today, with more soreness after last session, but this may have been from overdoing it with activity. She does acknowledge improvements however in this pain radiating into front of pelvis, and less pain when lying on her side.    Objective: See treatment diary below    Lumbar AROM %  R rotation 100%  L rotation 100%    Assessment: Tolerated treatment well. Patient presented to therapy with further improvement in lumbar rotation AROM. No significant change in symptoms with supine thrust mobilization. Progressed extension exercises with press up and exhale. Patient noted less stiffness following but still had mild low back pain. Significant weight shift seen in quadruped on L side with bird dog and LE lifts; indicating continued core weakness. No worsening of symptoms by end of session. Patient would benefit from continued skilled PT per plan of care to address impairments and improve function.       Plan: Continue per plan of care.      Precautions: none. EPOC 24      Manuals           Neutral gap mobilization gr 5   nv          Supine lumbopelvic thrust mob gr 5 SANA SANA SANA          P-a jt mob gr 3-4  SANA assessed SANA          L psoas   STM/TPR          Neuro Re-Ed             Quadruped UE lift  5x D/c          Bird dog   5 ea          Quadruped LE lift  5x 5x          kegel  10x 10x          TA brace with exhale  10x 10x          Posterior pelvic tilt   10x          PPT with bridge  nv 10x          Brace with adductor squeeze  10x 10x          Cat camel  10x 10x          Ther Ex                          Patient edu on pathology, POC, overall favorable prognosis, avoidance of  "bed rest, HEP 8' 5'           Piriformis stretch  3x30\" 3x10\"          Press ups  2x10 1x10          Press ups w/exhale   1x10          Supine hip flexor stretch  5x10\"           Lumbar trunk rotation  5x10\" 5x10\"                                    Ther Activity                                       Gait Training                                       Modalities             Moist hot pack end  8'                             "

## 2024-07-08 ENCOUNTER — OFFICE VISIT (OUTPATIENT)
Dept: PHYSICAL THERAPY | Facility: CLINIC | Age: 39
End: 2024-07-08
Payer: COMMERCIAL

## 2024-07-08 DIAGNOSIS — M54.41 ACUTE BILATERAL LOW BACK PAIN WITH BILATERAL SCIATICA: Primary | ICD-10-CM

## 2024-07-08 DIAGNOSIS — M54.42 ACUTE BILATERAL LOW BACK PAIN WITH BILATERAL SCIATICA: Primary | ICD-10-CM

## 2024-07-08 PROCEDURE — 97140 MANUAL THERAPY 1/> REGIONS: CPT | Performed by: PHYSICAL THERAPIST

## 2024-07-08 PROCEDURE — 97112 NEUROMUSCULAR REEDUCATION: CPT | Performed by: PHYSICAL THERAPIST

## 2024-07-08 NOTE — PROGRESS NOTES
"Daily Note     Today's date: 2024  Patient name: Damir Shay  : 1985  MRN: [id number]  Referring provider: Masoud Arvizu, ERIC  Dx:   Encounter Diagnosis     ICD-10-CM    1. Acute bilateral low back pain with bilateral sciatica  M54.42     M54.41                      Subjective: patient reports that she hasn't been having as much back pain lately and not having to take as much pain medication in evening. She reports increased L hip pain over the past two days however but was swimming and more active.    Objective: See treatment diary below    Inferior/anterior rotation of L innominate    Assessment: Tolerated treatment well. Upon assessment, patient displayed left anterior hip rotation and asymmetry when assessed ASIS. Performed inferior thrust mob/long axis distraction. Patient had less hip pain following when standing/walking. R hip/LBP was provoked with supine hip flexion stretch. R hip pain provoked during second set of press ups. Patient would benefit from continued skilled PT per plan of care to address impairments and improve function.       Plan: Continue per plan of care.      Precautions: none. EPOC 24      Manuals          Neutral gap mobilization gr 5   nv          Supine lumbopelvic thrust mob gr 5 SANA SANA SANA          Inferior mobilization at sacrum gr 3-4    SANA         L hip inferior thrust mob    SANA         P-a jt mob gr 3-4  SANA assessed SANA SANA         L psoas   STM/TPR          Neuro Re-Ed                          Bird dog   5 ea 5 ea         Quadruped LE lift  5x 5x          kegel  10x 10x          TA brace with exhale  10x 10x 10x w/bridge         Posterior pelvic tilt   10x 10x         PPT with bridge  nv 10x 10x         Brace with adductor squeeze  10x 10x          Cat camel  10x 10x 10x         Ther Ex                          Patient edu on pathology, POC, overall favorable prognosis, avoidance of bed rest, HEP 8' 5'  2'         Piriformis stretch  3x30\" " "3x10\" 3x30\"         Figure 4 stretch    3x30\"         Press ups  2x10 1x10 2x10         Press ups w/exhale   1x10          Supine hip flexor stretch  5x10\"           Lumbar trunk rotation  5x10\" 5x10\"                                    Ther Activity                                       Gait Training                                       Modalities             Moist hot pack end  8'                             "

## 2024-07-10 ENCOUNTER — OFFICE VISIT (OUTPATIENT)
Dept: PHYSICAL THERAPY | Facility: CLINIC | Age: 39
End: 2024-07-10
Payer: COMMERCIAL

## 2024-07-10 DIAGNOSIS — M54.42 ACUTE BILATERAL LOW BACK PAIN WITH BILATERAL SCIATICA: Primary | ICD-10-CM

## 2024-07-10 DIAGNOSIS — M54.41 ACUTE BILATERAL LOW BACK PAIN WITH BILATERAL SCIATICA: Primary | ICD-10-CM

## 2024-07-10 PROCEDURE — 97110 THERAPEUTIC EXERCISES: CPT | Performed by: PHYSICAL THERAPIST

## 2024-07-10 PROCEDURE — 97112 NEUROMUSCULAR REEDUCATION: CPT | Performed by: PHYSICAL THERAPIST

## 2024-07-10 PROCEDURE — 97140 MANUAL THERAPY 1/> REGIONS: CPT | Performed by: PHYSICAL THERAPIST

## 2024-07-10 NOTE — PROGRESS NOTES
Daily Note     Today's date: 7/10/2024  Patient name: Damir Shay  : 1985  MRN: [id number]  Referring provider: Masoud Arvizu, PT  Dx:   Encounter Diagnosis     ICD-10-CM    1. Acute bilateral low back pain with bilateral sciatica  M54.42     M54.41                      Subjective: patient reports that she was a little sore following last session and her hip is sore today, but she was putting together furniture from [employer].     Objective: See treatment diary below    Upslip/elevated innominate on R side    Assessment: Tolerated treatment fair. Upon assessment, patient displayed right upslip of [place]/SI joint. Long axis distraction was performed but no change in pain. Soft tissue mobilization was performed to L psoas which reduced tension. Patient had L hip tightness during piriformis stretch so ER mobilization was performed; positive response during but little change noted with piriformis stretch following. Core stabilization exercises performed without pain provocation. Recommended patient focus hip stretches and press ups if pain persists. Patient would benefit from continued skilled PT per plan of care to address impairments and improve function.       Plan: Continue per plan of care. Progress core stabilization and hip/lumbar mobility as tolerated.     Precautions: none. EPOC 24      Manuals 6/24 6/26 7/1 7/8 7/10        Neutral gap mobilization gr 5   nv  SANA b/l        Supine lumbopelvic thrust mob gr 5 SANA SANA SANA          Inferior mobilization at sacrum gr 3-4    SANA SANA        L hip inferior thrust mob    SANA SANA R side        L hip belt mobilization into ER     SANA        P-a jt mob gr 3-4  SANA assessed SANA SANA SANA        L psoas   STM/TPR  SANA        Neuro Re-Ed             Paloff press     10 ea gtb        Bird dog   5 ea 5 ea 10 ea        Dead bug     nv        kegel  10x 10x          TA brace with exhale  10x 10x 10x w/bridge         Posterior pelvic tilt   10x 10x         PPT with bridge  nv  "10x 10x nv        Brace with adductor squeeze  10x 10x          Cat camel  10x 10x 10x 10x        Ther Ex                          Patient edu on pathology, POC, overall favorable prognosis, avoidance of bed rest, HEP 8' 5'  2'         Piriformis stretch  3x30\" 3x10\" 3x30\" 3x30\"          Figure 4 stretch    3x30\" 3x30\"          Press ups  2x10 1x10 2x10 1x10        Press ups w/exhale   1x10          Supine hip flexor stretch  5x10\"   5x10\" ea        Lumbar trunk rotation  5x10\" 5x10\"  D/c                                  Ther Activity                                       Gait Training                                       Modalities             Moist hot pack end  8'                             "

## 2024-07-15 ENCOUNTER — APPOINTMENT (OUTPATIENT)
Dept: PHYSICAL THERAPY | Facility: CLINIC | Age: 39
End: 2024-07-15
Payer: COMMERCIAL

## 2024-07-22 ENCOUNTER — OFFICE VISIT (OUTPATIENT)
Dept: PHYSICAL THERAPY | Facility: CLINIC | Age: 39
End: 2024-07-22
Payer: COMMERCIAL

## 2024-07-22 DIAGNOSIS — M54.41 ACUTE BILATERAL LOW BACK PAIN WITH BILATERAL SCIATICA: Primary | ICD-10-CM

## 2024-07-22 DIAGNOSIS — M54.42 ACUTE BILATERAL LOW BACK PAIN WITH BILATERAL SCIATICA: Primary | ICD-10-CM

## 2024-07-22 PROCEDURE — 97112 NEUROMUSCULAR REEDUCATION: CPT | Performed by: PHYSICAL THERAPIST

## 2024-07-22 PROCEDURE — 97140 MANUAL THERAPY 1/> REGIONS: CPT | Performed by: PHYSICAL THERAPIST

## 2024-07-22 PROCEDURE — 97110 THERAPEUTIC EXERCISES: CPT | Performed by: PHYSICAL THERAPIST

## 2024-07-22 NOTE — PROGRESS NOTES
Daily Note     Today's date: 2024  Patient name: Damir Shay  : 1985  MRN: [id number]  Referring provider: Masoud Arvizu, PT  Dx:   Encounter Diagnosis     ICD-10-CM    1. Acute bilateral low back pain with bilateral sciatica  M54.42     M54.41                      Subjective: patient reports that she hasn't been that bad. She isn't getting pain radiating into her hips as much. She tried weighted hula hoop one day but instantly had more back pain. She still feels more sore by the end of the day, but everyday activities are not as painful.    Objective: See treatment diary below      Assessment: Tolerated treatment well. Patient demonstrated good lumbar mobility today with AROM assessment. She noted mild R hip/low back pain with long axis distraction, but this subsided following STM to R psoas. Least irritability seen to date. Patient was able to advance core exercises with increased resistance for paloff press and addition of dead bug. Patient would benefit from continued skilled PT per plan of care to address impairments and improve function.       Plan: Continue per plan of care. Progress core stabilization and hip/lumbar mobility as tolerated.     Precautions: none. EPOC 24      Manuals 6/24 6/26 7/1 7/8 7/10 7/22       Neutral gap mobilization gr 5   nv  SANA b/l        Supine lumbopelvic thrust mob gr 5 SANA SANA SANA          Inferior mobilization at sacrum gr 3-4    SAAN SANA        L hip inferior thrust mob    SANA SANA R side        L hip belt mobilization into ER     SANA        P-a jt mob gr 3-4  SANA assessed SANA SANA SANA        L psoas   STM/TPR  SANA SANA R       Neuro Re-Ed             Paloff press     10 ea gtb 10 ea bltb       Bird dog   5 ea 5 ea 10 ea 10 ea       Dead bug     nv 10 ea       kegel  10x 10x          TA brace with exhale  10x 10x 10x w/bridge         Posterior pelvic tilt   10x 10x         PPT with bridge  nv 10x 10x nv 2x10       Brace with adductor squeeze  10x 10x          Cat  "camel  10x 10x 10x 10x 10x       Ther Ex             Elliptical for strength and return to recreational ex      5' LVL 1       Patient edu on pathology, POC, overall favorable prognosis, avoidance of bed rest, HEP 8' 5'  2'         Piriformis stretch  3x30\" 3x10\" 3x30\" 3x30\"          Figure 4 stretch    3x30\" 3x30\"   3x30\"         Press ups  2x10 1x10 2x10 1x10 1x10       Press ups w/exhale   1x10          Supine hip flexor stretch  5x10\"   5x10\" ea 3x30\"                                              Ther Activity                                       Gait Training                                       Modalities             Moist hot pack end  8'                             "

## 2024-07-29 ENCOUNTER — OFFICE VISIT (OUTPATIENT)
Dept: PHYSICAL THERAPY | Facility: CLINIC | Age: 39
End: 2024-07-29
Payer: COMMERCIAL

## 2024-07-29 DIAGNOSIS — M54.42 ACUTE BILATERAL LOW BACK PAIN WITH BILATERAL SCIATICA: Primary | ICD-10-CM

## 2024-07-29 DIAGNOSIS — M54.41 ACUTE BILATERAL LOW BACK PAIN WITH BILATERAL SCIATICA: Primary | ICD-10-CM

## 2024-07-29 PROCEDURE — 97112 NEUROMUSCULAR REEDUCATION: CPT | Performed by: PHYSICAL THERAPIST

## 2024-07-29 PROCEDURE — 97110 THERAPEUTIC EXERCISES: CPT | Performed by: PHYSICAL THERAPIST

## 2024-07-29 PROCEDURE — 97140 MANUAL THERAPY 1/> REGIONS: CPT | Performed by: PHYSICAL THERAPIST

## 2024-07-29 NOTE — PROGRESS NOTES
Daily Note     Today's date: 2024  Patient name: Damir Shay  : 1985  MRN: [id number]  Referring provider: Masoud Arvizu, ERIC  Dx:   Encounter Diagnosis     ICD-10-CM    1. Acute bilateral low back pain with bilateral sciatica  M54.42     M54.41                      Subjective: patient reports that she was really active over the weekend with her daughter's baby shower, and didn't have pain, which was a big improvement. She can also carry watering can without pain most of the time. Patient notes some R sided low back pain today after helping carry pack and play and high chair for her daughter. She feels that she is probably 80-90% recovered.    Objective: See treatment diary below    R sided back pain with lumbar extension AROM 50-75%      Assessment: Tolerated treatment well. Patient presented with increased tightness on R side of lumbar spine and psoas. Performed neutral gap mobilization to R side; only slight improvement in lumbar extension noted but she tolerated sidelying inferior distraction to ilium/QL well. Patient would benefit from continued skilled PT per plan of care to address impairments and improve function.       Plan: Continue per plan of care. Progress core stabilization and hip/lumbar mobility as tolerated.     Precautions: none. EPOC 24      Manuals 6/24 6/26 7/1 7/8 7/10 7/22 7/29      Neutral gap mobilization gr 5   nv  SANA b/l  SANA R      Supine lumbopelvic thrust mob gr 5 SANA SANA SANA          Inferior mobilization at sacrum gr 3-4    SANA SANA        L hip inferior thrust mob    SANA SANA R side  SANA sidelying R QL/ilium distraction      L hip belt mobilization into ER     SANA        P-a jt mob gr 3-4  SANA assessed SANA SANA SANA  SANA pain      L psoas   STM/TPR  SANA SANA R SANA b/l      Neuro Re-Ed             Paloff press     10 ea gtb 10 ea bltb nv      Bird dog   5 ea 5 ea 10 ea 10 ea 10 ea      Dead bug     nv 10 ea 10 ea                                             PPT with bridge  nv 10x  "10x nv 2x10 2x10      Brace with adductor squeeze  10x 10x          Cat camel  10x 10x 10x 10x 10x 10x      Ther Ex             Elliptical for strength and return to recreational ex      5' LVL 1 nv      Patient edu on pathology, POC, overall favorable prognosis, avoidance of bed rest, HEP 8' 5'  2'   3' HEP      Piriformis stretch  3x30\" 3x10\" 3x30\" 3x30\"          Figure 4 stretch    3x30\" 3x30\"   3x30\"   3x30\"      Press ups  2x10 1x10 2x10 1x10 1x10 1x10      Supine QL stretch       5x10\" ea      Press ups w/exhale   1x10          Supine hip flexor stretch  5x10\"   5x10\" ea 3x30\" 3x30\"      Plank on knees       20\" D/c     Plank on toes       20\"                   Ther Activity                                       Gait Training                                       Modalities             Moist hot pack end  8'                             "

## 2024-08-26 ENCOUNTER — TELEPHONE (OUTPATIENT)
Dept: CARDIOLOGY CLINIC | Facility: CLINIC | Age: 39
End: 2024-08-26

## 2024-08-26 NOTE — TELEPHONE ENCOUNTER
Caller: Damir    Doctor/Office: Cardiology    Call regarding :  24 hour holter     Call was transferred to: Central scheduling

## 2024-08-27 ENCOUNTER — HOSPITAL ENCOUNTER (OUTPATIENT)
Dept: ULTRASOUND IMAGING | Facility: HOSPITAL | Age: 39
Discharge: HOME | End: 2024-08-27
Payer: COMMERCIAL

## 2024-08-27 DIAGNOSIS — R14.0 ABDOMINAL DISTENTION: ICD-10-CM

## 2024-08-27 PROCEDURE — 76856 US EXAM PELVIC COMPLETE: CPT

## 2024-08-27 PROCEDURE — 76830 TRANSVAGINAL US NON-OB: CPT

## 2024-09-09 ENCOUNTER — HOSPITAL ENCOUNTER (OUTPATIENT)
Dept: NON INVASIVE DIAGNOSTICS | Age: 39
Discharge: HOME | End: 2024-09-09
Payer: COMMERCIAL

## 2024-09-09 DIAGNOSIS — R00.2 PALPITATION: ICD-10-CM

## 2024-09-09 PROCEDURE — 93225 XTRNL ECG REC<48 HRS REC: CPT

## 2024-09-09 PROCEDURE — 93226 XTRNL ECG REC<48 HR SCAN A/R: CPT

## 2024-09-12 ENCOUNTER — APPOINTMENT (OUTPATIENT)
Age: 39
End: 2024-09-12
Payer: COMMERCIAL

## 2024-09-12 ENCOUNTER — OFFICE VISIT (OUTPATIENT)
Age: 39
End: 2024-09-12
Payer: COMMERCIAL

## 2024-09-12 VITALS
DIASTOLIC BLOOD PRESSURE: 64 MMHG | BODY MASS INDEX: 25.27 KG/M2 | SYSTOLIC BLOOD PRESSURE: 100 MMHG | HEIGHT: 64 IN | WEIGHT: 148 LBS

## 2024-09-12 DIAGNOSIS — M54.2 NECK PAIN: Primary | ICD-10-CM

## 2024-09-12 DIAGNOSIS — M79.18 MYOFASCIAL PAIN: ICD-10-CM

## 2024-09-12 DIAGNOSIS — M54.50 LUMBAR SPINE PAIN: ICD-10-CM

## 2024-09-12 PROCEDURE — 99203 OFFICE O/P NEW LOW 30 MIN: CPT | Performed by: CHIROPRACTOR

## 2024-09-12 PROCEDURE — 98941 CHIROPRACT MANJ 3-4 REGIONS: CPT | Performed by: CHIROPRACTOR

## 2024-09-12 PROCEDURE — 72050 X-RAY EXAM NECK SPINE 4/5VWS: CPT

## 2024-09-12 NOTE — PROGRESS NOTES
Assessment/Plan:    1. Neck pain  XR spine cervical complete 4 or 5 vw non injury      2. Myofascial pain        3. Lumbar spine pain            Review of Diagnostic Studies and Office Notes:  The patient's PT notes were reviewed prior to the chiropractic evaluation today.    Reviewed images(bone windows) from CT scan of abdomen and pelvis done on 6/3/24.      Decision and Treatment Plan:    I reviewed my findings with the patient today.  Patient was interested in receiving chiropractic care and was treated today.  We will treat the patient 2x/week for the next three weeks and re-evaluate. If there is improvement in symptoms and objective findings, frequency will be reduced.       The patient will be treated with conservative chiropractic care including myofascial release, joint mobilization, and a home stretching and icing program.    Patient Instructions:    Return for treatment twice next week.  Ice 15 minutes as needed for post treatment soreness.  Continue with stretching as per PT.  X-rays of the cervical spine were ordered today.     TIME/Reviewed with Patient:  Time:    At least 35 minutes of time was spent with the patient during the consultation including the patient's history/current complaints, physical exam, reviewing the diagnostic report, reviewing home instruction/reducing risk factors with the patient, reviewing my findings with the patient, and discussing the treatment with the patient.     This is a separate identifiable portion of today's visit from the E and M code billed.    Treatment today consisted of myofascial release to the bilateral upper trapezius, levator scapula, rhomboids, lumbar paraspinals, gluteus medius bilaterally.    Manipulation was performed to the lumbopelvic spine utilizing Dyson drop technique to the right SI joint and L5-S1, to the thoracic spine at T3-4 T5-6 utilizing a double thenar contact, to the cervical spine at C5-6 utilizing stairstepping technique. No HVLA  "was performed to L-spine or c-spine.       Review of Systems   Constitutional:  Negative for chills, fever and unexpected weight change.   Gastrointestinal:  Negative for constipation and diarrhea.   Genitourinary:  Negative for difficulty urinating and urgency.     Subjective:    Damir Shay is a 39 y.o. female who presents today for chiropractic evaluation. Patient came here on has chief complaint of neck/upper back as well as lower back pain with pain that radiates into the posterior lateral hip at times.  She also gets pain intermittently in the front of her thighs.  She states she had been doing a lot of yard work as well as moving.  She states the symptoms have been present for approximately 3 to 4 months.  In the past she has done chiropractic care for similar episodes.  She states 1 time she has done chiropractic care was 6 months ago.  She has had issues with her neck and upper back on and off for up to 10 years.  She describes her pain as a stiffness/tightness as well as achiness.  She denies numbness or tingling in the upper extremities pain radiating down the arms.    Initially she did go to the emergency department on 6/3/2024 for lower back pain as well as leg pain.  She was having abdominal pain at that time 2.    She states she did go to physical therapy a couple months ago and it was helpful but the symptoms have persisted.  She has not had any recent imaging of her spine.  The CT scan of her abdomen and pelvis was done.  It appears a ultrasound was also done of her abdomen and then more recently her pelvis.    Objective:     /64   Ht 5' 4\" (1.626 m)   Wt 67.1 kg (148 lb)   BMI 25.40 kg/m²     Appearance: Well developed, well nourished, and in no acute distress    Alert and oriented x 3    Mood/Affect: calm and pleasant    Gait/Posture:    Gait: Normal    Antalgic posture: None    Lordosis: Cervical- decreased    Lordosis: Lumbar- normal    Kyphosis: Thoracic- normal    Upper " extremity reflexes:    Deep tendon reflexes were normal and symmetrical in the upper extremities.    Rollins's was negative bilaterally.    Cervical Orthopedic Tests:    Maximal foraminal Compression on the Right: negative.    Maximal foraminal Compression on the Left: negative.    Active Cervical Ranges of Motion:    Cervical range of motion shows full flexion with mild pulling at the end range.  Extension is mildly painful.  Left rotation is full and intact.  Right rotation is mildly limited with mild pulling noted at the end range.    Lower extremity reflexes:    Deep tendon reflexes were normal and symmetrical in the bilateral lower extremities.    Lower Extremity Muscle Testing:    Muscle testing of the bilateral lower extremities was normal and graded +5/5.    Babinski was negative bilaterally.    Lumbar Orthopedic Tests:    Supine Straight Leg Raise was negative on the Right.    Supine Straight Leg Raise was negative on the Left.    Active Lumbar Ranges of Motion:    Lumbar range of motion examination shows flexion to be 25% restricted with pain.  Extension is 50% restricted with pain.  Lateral bending is full and pain-free.    Palpation:  Moderate spasm and tenderness is noted in the lumbar paraspinals, gluteus medius and piriformis bilaterally.  Mild to moderate spasm and tenderness is noted in bilateral levator scapula, rhomboids and upper trapezius.    Joint dysfunction for the last seen 4 5, T3-4, T5-6, L5-S1 sacroiliac joint.    Dictation voice to text software has been used in the creation of this document. Please consider this in light of any contextual or grammatical errors.

## 2024-09-16 ENCOUNTER — PROCEDURE VISIT (OUTPATIENT)
Age: 39
End: 2024-09-16
Payer: COMMERCIAL

## 2024-09-16 VITALS — HEIGHT: 64 IN | WEIGHT: 148 LBS | BODY MASS INDEX: 25.27 KG/M2

## 2024-09-16 DIAGNOSIS — M54.2 NECK PAIN: Primary | ICD-10-CM

## 2024-09-16 DIAGNOSIS — M79.18 MYOFASCIAL PAIN: ICD-10-CM

## 2024-09-16 DIAGNOSIS — M54.16 LUMBAR RADICULAR PAIN: ICD-10-CM

## 2024-09-16 DIAGNOSIS — M54.50 LUMBAR SPINE PAIN: ICD-10-CM

## 2024-09-16 PROCEDURE — 98941 CHIROPRACT MANJ 3-4 REGIONS: CPT | Performed by: CHIROPRACTOR

## 2024-09-16 NOTE — PROGRESS NOTES
Assessment:     1. Neck pain        2. Myofascial pain        3. Lumbar spine pain        4. Lumbar radicular pain            Condition is the same    PLAN/TREATMENT:    Return for treatment later this week.     Continue using ice  Continue stretching.  Has exercises from PT.     Treatment:  Treatment today consisted of myofascial release to the bilateral upper trapezius, levator scapula, rhomboids, lumbar paraspinals, gluteus medius bilaterally.     Manipulation was performed to the lumbopelvic spine utilizing Dyson drop technique and side posture mobilization to the right SI joint and L5-S1, to the thoracic spine at T3-4 T5-6 utilizing a double thenar contact, to the cervical spine at C5-6 utilizing stairstepping technique. No HVLA was performed to L-spine or c-spine.     Subjective:     Damir is here today with ongoing complaints of LBP and neck pain. .  Has some increased LBP and right posterolateral thigh pain. Felt a little better after last visit.  After doing some yard work and other work around the house the LBP and right leg pain got irritated and returned.     Objective:  Moderate spasm and tenderness is noted in the lumbar paraspinals, gluteus medius and piriformis bilaterally.  Mild to moderate spasm and tenderness is noted in bilateral levator scapula, rhomboids and upper trapezius.     Joint dysfunction is present at T4- 5, T3-4, T5-6, L5-S1 sacroiliac joint.

## 2024-09-18 ENCOUNTER — PATIENT MESSAGE (OUTPATIENT)
Dept: CARDIOLOGY CLINIC | Facility: CLINIC | Age: 39
End: 2024-09-18

## 2024-09-19 LAB
ALBUMIN SERPL-MCNC: 4.6 G/DL (ref 3.9–4.9)
ALP SERPL-CCNC: 50 IU/L (ref 44–121)
ALT SERPL-CCNC: 14 IU/L (ref 0–32)
AST SERPL-CCNC: 20 IU/L (ref 0–40)
BASOPHILS # BLD AUTO: 0.1 X10E3/UL (ref 0–0.2)
BASOPHILS NFR BLD AUTO: 2 %
BILIRUB SERPL-MCNC: 0.7 MG/DL (ref 0–1.2)
BUN SERPL-MCNC: 12 MG/DL (ref 6–20)
BUN/CREAT SERPL: 16 (ref 9–23)
CALCIUM SERPL-MCNC: 9.3 MG/DL (ref 8.7–10.2)
CHLORIDE SERPL-SCNC: 103 MMOL/L (ref 96–106)
CO2 SERPL-SCNC: 23 MMOL/L (ref 20–29)
CREAT SERPL-MCNC: 0.76 MG/DL (ref 0.57–1)
EGFR: 102 ML/MIN/1.73
EOSINOPHIL # BLD AUTO: 0.2 X10E3/UL (ref 0–0.4)
EOSINOPHIL NFR BLD AUTO: 3 %
ERYTHROCYTE [DISTWIDTH] IN BLOOD BY AUTOMATED COUNT: 11.6 % (ref 11.7–15.4)
FERRITIN SERPL-MCNC: 107 NG/ML (ref 15–150)
GLOBULIN SER-MCNC: 2.1 G/DL (ref 1.5–4.5)
GLUCOSE SERPL-MCNC: 94 MG/DL (ref 70–99)
HCT VFR BLD AUTO: 40.1 % (ref 34–46.6)
HGB BLD-MCNC: 13.3 G/DL (ref 11.1–15.9)
IMM GRANULOCYTES # BLD: 0 X10E3/UL (ref 0–0.1)
IMM GRANULOCYTES NFR BLD: 0 %
IRON SATN MFR SERPL: 39 % (ref 15–55)
IRON SERPL-MCNC: 124 UG/DL (ref 27–159)
LDH SERPL-CCNC: 137 IU/L (ref 119–226)
LYMPHOCYTES # BLD AUTO: 1.4 X10E3/UL (ref 0.7–3.1)
LYMPHOCYTES NFR BLD AUTO: 30 %
MCH RBC QN AUTO: 33.2 PG (ref 26.6–33)
MCHC RBC AUTO-ENTMCNC: 33.2 G/DL (ref 31.5–35.7)
MCV RBC AUTO: 100 FL (ref 79–97)
MONOCYTES # BLD AUTO: 0.4 X10E3/UL (ref 0.1–0.9)
MONOCYTES NFR BLD AUTO: 8 %
NEUTROPHILS # BLD AUTO: 2.7 X10E3/UL (ref 1.4–7)
NEUTROPHILS NFR BLD AUTO: 57 %
PLATELET # BLD AUTO: 254 X10E3/UL (ref 150–450)
POTASSIUM SERPL-SCNC: 4.2 MMOL/L (ref 3.5–5.2)
PROT SERPL-MCNC: 6.7 G/DL (ref 6–8.5)
RBC # BLD AUTO: 4.01 X10E6/UL (ref 3.77–5.28)
RETICS/RBC NFR AUTO: 1.2 % (ref 0.6–2.6)
SODIUM SERPL-SCNC: 138 MMOL/L (ref 134–144)
TIBC SERPL-MCNC: 317 UG/DL (ref 250–450)
UIBC SERPL-MCNC: 193 UG/DL (ref 131–425)
WBC # BLD AUTO: 4.8 X10E3/UL (ref 3.4–10.8)

## 2024-09-26 NOTE — PROGRESS NOTES
HEMATOLOGY / ONCOLOGY CLINIC FOLLOW UP NOTE    Patient Damir Shay  MRN: [id number]  : 1985  Date of Encounter 2024        Referring Provider:       Reason for Encounter: follow up iron deficiency anemia                Discussion    ID is not a final diagnosis; rather, it is indicative of an underlying etiology that is decreasing iron availability and/or increasing iron needs To effectively manage ID/NAVARRO, the underlying etiology must be identified and, if possible, treated.  the picture below outlines a number of key causes of ID/NAVARRO. In a woman of reproductive age, vaginal blood loss must be considered and explored with a detailed bleeding history.  GI blood loss or malabsorptive states must be considered in all patients. Although it is especially important in men and in all patients older than age 50 years, it is important to remember that celiac disease  and GI malignancy  can occur in both sexes at any age. Blood donors are at risk of ID, and thus current or previous blood donation is an important element to obtain from the patient history  Additional considerations include detailed medication history (eg, oral anticoagulants, antacids, proton pump inhibitors) and dietary history.           Assessment / Plan:      Patient s a 39yF with Pmhx of IBS, Adenomyosis, exocrine insufficiency, PTSD, anxiety presents with several symptoms perimenopausal found to have ferritin 8 total iron 36, no % sat/TIBC done, Hgb 12 MCV 92 WBC 3.9        Iron Deficiency Anemia     Had long discussion about repleting iron stores with parenteral iron.  She has been on oral iron 325 mg every other day for 2 months with no change.  This is also a low dose and she has exocrine insufficiency and may not be absorbing iron in the face of continued iron loss.     Thus, 200 mg IV Venofer weekly for 6 weeks will be prescribed.  She will start within the week.  Tolerance is excellent with minimal chance of infusion reaction as  this is iron sucrose     Venofer AE s as follows:     >10%:  Cardiovascular: Hypotension (children and adolescents: 2%; adults: HDD-CKD: 39%, NDD/PDD-CKD: 2% to 3%)   Gastrointestinal: Nausea (children and adolescents: 3%; adults: 5% to 15%)  Nervous system: Headache (children and adolescents: 6%; adults: HDD-CKD: 13%, NDD/PDD-CKD: 3% to 4%)  Neuromuscular & skeletal: Muscle cramps (HDD-CKD: 29%, NDD/PDD-CKD: <=3%)  Respiratory: Nasopharyngitis (<=16%) pharyngitis (<=16%), sinusitis (<=16%), upper respiratory tract infection (<=16%)     CBC with diff and iron panel will be repeated about 8 weeks after completion     She is in agreement with this approach        6/26/2024     Iron studies done 20 June2024; total iron 79 ferritin 143 % sat 29 TIBC 269      Hgb 13 MCV 96 plts  290 WBC 4.5     Completed 6 doses Venofer  16th May 2024     Periods lighter     Repeat labs in 3 months    9/27/2024    Doing well; recent birth of grandson    Labs 9/18/2024 with total iron 124 ferritin 107 % sat 39  TIBC 317 after 6 weekly doses Venofer last in May 2024  WBC 4.8 Hgb 13.3  plts 254    Will check B12/folate levels next visit; is on B12/folate supplements    Otherwise, no intervention    Repeat all labs 3 months         Perimenopausal Symptoms     Patient with  hot flashes, fatigue, weight gain; mother had early menopause. Patient has menorrhagia as well as adenomyosis.     Gyn following, have suggested hormonal manipulation which the patient does not want to do.  May consider Mirena as well although she feels, if the same pattern as her mother, periods will stop within the year.  However, hormonal workup is being done.     Agree with checking TFTs based on her age    Continues with perimenopausal symptoms      Follow up      3 months           HPI:   3/27/2024     Damir Shay is a 39 y.o. year old female with history of IBS, PTSD, VitD def who presents today d/t concern for hormonal concerns. Patient reports she's  been having issues with polydipsia, hot flashes and weight gain.   Patient had her A1C checked 12/23 was 5.3%, BG on CMP 94.  Patients last TFT 06/23 euthyroid     Patient reports having polyuria, polydipsia since last month. Denies any increased intake of caffeine, alcohol or energy drinks. Drinks only mildly caffenated beverages. Drinks water often since reports is thirsty. Wakes up 3-4x at night to urinate     Does report has adenomyosis of uterus for which she is following OB soon.      Also reports having issues with exocrine pancreatic insufficiency dx based on having diarrhea for 1 month and prescribed creon but she didn't take these are diarrhea now resolved. Had normal IBC now.      Reports also having hot flashes mainly at night, more often now. Also reports initially lost weight when having diarrhea and now regained it back and gained 10 more lbs. Denies any wheezing.      Reports anxiety as well getting worse.      Reports menses are 4-8 days irregular but still cycling every month.      Has been seen by GI, had EGD and found to have gastritis     Was found to have Hgb 12 MCV 92 plts 257 WBC 3.9 total iron 36 ferritin 8 3/12/2024; last labs in Dec 2023 with ferritin 9 with no total iron done     She has been on low dose oral iron every other day with no improvement; dose is low but this adds to constipation and thus not taking it daily or bid.  Also unclear if using PPI/H2 blocker which changes the needed acid milieu for iron absorption.       She is here today regarding parenteral iron         Denies any recent illness/new medication use.  She has some SOB but not noticeable.  She is very fatigued.  She has no blood in stool/urine, does have heavy menses, no dysphagia, odynophagia, weight loss, B symptoms.  She is having hot flashes.       Family Hx- sister and mother have thyroid issues  Social Hx- uses marijuana, denies alcohol use anymore           Interval History:  6/26/2024     Ms Shay is  doing very well.  She states her energy levels are markedly improved, no more headaches, feeling rested.  Her Hgb is 13 MCV 96 TIBC 269 total iron 79 ferritin 143 % sat 29 all increased from March with ferritin 8 and total iron 36.  She states her periods are lighter as well which will help hold onto iron.  Overall she has no issues.         Interval History:  9/27/2024    Continues to do well; iron levels remains acceptable.  Did have heavy period recently.  Continues with perimenopausal symptoms.  Last Venofer appears to be in May 2024               Latest Reference Range & Units 09/18/24 08:29   Iron 27 - 159 ug/dL 124   FERRITIN 15 - 150 ng/mL 107   Iron Saturation 15 - 55 % 39   TIBC 250 - 450 ug/dL 317   UIBC 131 - 425 ug/dL 193   ALKALINE PHOSPHATASE ISOENZYMES 44 - 121 IU/L 50   Globulin, Total 1.5 - 4.5 g/dL 2.1   Lymphocytes (Absolute) 0.7 - 3.1 x10E3/uL 1.4   CALCIUM 8.7 - 10.2 mg/dL 9.3   White Blood Cell Count 3.4 - 10.8 x10E3/uL 4.8   Red Blood Cell Count 3.77 - 5.28 x10E6/uL 4.01   Hemoglobin 11.1 - 15.9 g/dL 13.3   HCT 34.0 - 46.6 % 40.1   MCV 79 - 97 fL 100 (H)   MCH 26.6 - 33.0 pg 33.2 (H)   MCHC. 31.5 - 35.7 g/dL 33.2   RDW 11.7 - 15.4 % 11.6 (L)   Platelet Count 150 - 450 x10E3/uL 254   Neutrophils Not Estab. % 57   Lymphocytes Not Estab. % 30   Monocytes Not Estab. % 8   Eosinophils Not Estab. % 3   Basophils PCT Not Estab. % 2   Neutrophils (Absolute) 1.4 - 7.0 x10E3/uL 2.7   Monocytes (Absolute) 0.1 - 0.9 x10E3/uL 0.4   Eosinophils (Absolute) 0.0 - 0.4 x10E3/uL 0.2   Basophils ABS 0.0 - 0.2 x10E3/uL 0.1   Immature Granulocytes Not Estab. % 0   Immature Granulocytes (Absolute) 0.0 - 0.1 x10E3/uL 0.0   (H): Data is abnormally high  (L): Data is abnormally low    REVIEW OF SYSTEMS:  Please note that a 14-point review of systems was performed to include Constitutional, HEENT, Respiratory, CVS, GI, , Musculoskeletal, Integumentary, Neurologic, Rheumatologic, Endocrinologic, Psychiatric,  Lymphatic, and Hematologic/Oncologic systems were reviewed and are negative unless otherwise stated in HPI. Positive and negative findings pertinent to this evaluation are incorporated into the history of present illness.      ECOG PS: 0    PROBLEM LIST:  Patient Active Problem List   Diagnosis    Cholelithiasis without obstruction    Chronic constipation    Hypothyroidism due to acquired atrophy of thyroid    Iron deficiency anemia due to chronic blood loss    Pulmonary nodule seen on imaging study    Vitamin D deficiency    PTSD (post-traumatic stress disorder)    Medical marijuana use    Irritable bowel syndrome with diarrhea    Encounter for weight management    Epigastric pain    Anxiety    Adenomyosis of the uterus    Chronic superficial gastritis without bleeding    Low back strain, initial encounter       Past Medical History:   has a past medical history of Abnormal weight gain (10/30/2017), Cholelithiasis, COVID (10/10/2021), Elevated cortisol level (10/28/2021), Eustachian tube dysfunction, bilateral (10/28/2021), IBS (irritable bowel syndrome), Inflammatory bowel disease, Iron deficiency anemia, Menorrhagia, Overweight (BMI 25.0-29.9) (08/06/2022), and Palpitations.    PAST SURGICAL HISTORY:   has a past surgical history that includes [place] tooth extraction and Dilation and curettage of uterus.    CURRENT MEDICATIONS  Current Outpatient Medications   Medication Sig Dispense Refill    Multiple Vitamins-Minerals (MULTIVITAMIN ADULT) TABS Take 1 tablet by mouth      omeprazole (PriLOSEC) 20 mg delayed release capsule Take 1 capsule (20 mg total) by mouth daily 30 capsule 5    Sodium Fluoride 5000 PPM 1.1 % PSTE As needed       No current facility-administered medications for this visit.     [unfilled]    SOCIAL HISTORY:   reports that she quit smoking about 5 years ago. Her smoking use included cigarettes. She started smoking about 10 years ago. She has a 5 pack-year smoking history. She has been exposed to  tobacco smoke. She has never used smokeless tobacco. She reports current alcohol use of about 2.0 standard drinks of alcohol per week. She reports current drug use. Frequency: 7.00 times per week. Drug: Marijuana.     FAMILY HISTORY:  family history includes Colon polyps in her mother; Heart failure in her mother; Hypothyroidism in her sister.     ALLERGIES:  has No Known Allergies.      Physical Exam: no exam-telemedicine   Vital Signs:   Visit Vitals  OB Status Unknown   Smoking Status Former     There is no height or weight on file to calculate BMI.      Labs:  Lab Results   Component Value Date    WBC 4.8 2024    HGB 13.3 2024    HCT 40.1 2024     (H) 2024     2024     Lab Results   Component Value Date     2017    SODIUM 138 2024    K 4.2 2024     2024    CO2 23 2024    AGAP 4 2024    BUN 12 2024    CREATININE 0.76 2024    GLUC 94 2024    CALCIUM 9.1 2024    AST 20 2024    ALT 14 2024    ALKPHOS 34 2024    PROT 6.4 2017    TP 6.7 2024    BILITOT 0.6 2017    TBILI 0.7 2024    EGFR 102 2024           I spent 20 minutes on chart review, face to face counseling time, coordination of care and documentation.    Lien Morales MD PhD      Virtual Regular Visit  Name: Damir Shay      : 1985      MRN: [id number]  Encounter Provider: Lien Morales MD  Encounter Date: 2024   Encounter department: [facility]    Verification of patient location:    Patient is located at Home in the following state in which I hold an active license [place]    Assessment & Plan  Iron deficiency anemia due to chronic blood loss    Orders:    TIBC Panel (incl. Iron, TIBC, % Iron Saturation); Future    CBC and differential; Future    Comprehensive metabolic panel; Future    LD,Blood; Future    Ferritin; Future     CBC and differential    Comprehensive metabolic panel    LD,Blood    Ferritin      Vitamin B12 deficiency    Orders:    Folate; Future    Vitamin B12; Future    Folate    Vitamin B12          Encounter provider Lien Morales MD    The patient was identified by name and date of birth. Damir ESA MensahLaurita was informed that this is a telemedicine visit and that the visit is being conducted through the Epic Embedded platform. She agrees to proceed..  My office door was closed. No one else was in the room.  She acknowledged consent and understanding of privacy and security of the video platform. The patient has agreed to participate and understands they can discontinue the visit at any time.    Patient is aware this is a billable service.   20  Visit Time  Total Visit Duration: 20

## 2024-09-27 ENCOUNTER — TELEMEDICINE (OUTPATIENT)
Age: 39
End: 2024-09-27
Payer: COMMERCIAL

## 2024-09-27 DIAGNOSIS — E53.8 VITAMIN B12 DEFICIENCY: ICD-10-CM

## 2024-09-27 DIAGNOSIS — D50.0 IRON DEFICIENCY ANEMIA DUE TO CHRONIC BLOOD LOSS: Primary | ICD-10-CM

## 2024-09-27 PROCEDURE — 99213 OFFICE O/P EST LOW 20 MIN: CPT | Performed by: INTERNAL MEDICINE

## 2024-09-27 NOTE — ASSESSMENT & PLAN NOTE
Orders:    TIBC Panel (incl. Iron, TIBC, % Iron Saturation); Future    CBC and differential; Future    Comprehensive metabolic panel; Future    LD,Blood; Future    Ferritin; Future    CBC and differential    Comprehensive metabolic panel    LD,Blood    Ferritin

## 2024-11-15 ENCOUNTER — OFFICE VISIT (OUTPATIENT)
Dept: URGENT CARE | Facility: CLINIC | Age: 39
End: 2024-11-15
Payer: COMMERCIAL

## 2024-11-15 VITALS
SYSTOLIC BLOOD PRESSURE: 113 MMHG | OXYGEN SATURATION: 99 % | RESPIRATION RATE: 18 BRPM | DIASTOLIC BLOOD PRESSURE: 66 MMHG | HEART RATE: 56 BPM | TEMPERATURE: 98.7 F

## 2024-11-15 DIAGNOSIS — S90.32XA CONTUSION OF LEFT FOOT, INITIAL ENCOUNTER: Primary | ICD-10-CM

## 2024-11-15 PROCEDURE — 99213 OFFICE O/P EST LOW 20 MIN: CPT | Performed by: PHYSICIAN ASSISTANT

## 2024-11-15 NOTE — PROGRESS NOTES
[facility]        NAME: Damir Shay is a 39 y.o. female  : 1985    MRN: [id number]  DATE: November 15, 2024  TIME: 9:46 AM    Assessment and Plan   Contusion of left foot, initial encounter [S90.32XA]  1. Contusion of left foot, initial encounter  XR foot 3+ vw left    Ambulatory Referral to Orthopedic Surgery        Left foot Xray- No acute fracture or dislocations pending radiology review.    Patient Instructions   Patient was placed in left surgical shoe  Patient was educated on icing and taking over-the-counter Tylenol and ibuprofen for pain control.  Patient was given a referral to orthopedics.  Patient was told she may come out of  surgical shoe to do left ankle pumps.    Follow up with PCP in 3-5 days.  Proceed to  ER if symptoms worsen.    If tests have been performed at [facility], our office will contact you with results if changes need to be made to the care plan discussed with you at the visit.  [facility]    Chief Complaint     Chief Complaint   Patient presents with    Foot Pain     Patient dropped a plastic trash can on her left foot yesterday, she now has lots of pain and swelling. She is here to rule out a fracture          History of Present Illness       Patient is a pleasant 39-year-old female who presents today complaining of left foot pain.  Patient reports yesterday she dropped a trash can on her left foot.  Patient reports pain with walking.  Denies any prior left foot injuries.  Denies any allergies to medications.        Review of Systems   Review of Systems   Constitutional: Negative.    Respiratory: Negative.     Cardiovascular: Negative.    Musculoskeletal:         Left foot pain   Psychiatric/Behavioral: Negative.           Current Medications       Current Outpatient Medications:     Multiple Vitamins-Minerals (MULTIVITAMIN ADULT) TABS, Take 1 tablet by mouth, Disp: , Rfl:     omeprazole (PriLOSEC) 20 mg delayed  release capsule, Take 1 capsule (20 mg total) by mouth daily, Disp: 30 capsule, Rfl: 5    Sodium Fluoride 5000 PPM 1.1 % PSTE, As needed, Disp: , Rfl:     Current Allergies     Allergies as of 11/15/2024    (No Known Allergies)            The following portions of the patient's history were reviewed and updated as appropriate: allergies, current medications, past family history, past medical history, past social history, past surgical history and problem list.     Past Medical History:   Diagnosis Date    Abnormal weight gain 10/30/2017    Cholelithiasis     COVID 10/10/2021    Elevated cortisol level 10/28/2021    Eustachian tube dysfunction, bilateral 10/28/2021    IBS (irritable bowel syndrome)     Inflammatory bowel disease     Iron deficiency anemia     Menorrhagia     Overweight (BMI 25.0-29.9) 08/06/2022    Palpitations        Past Surgical History:   Procedure Laterality Date    DILATION AND CURETTAGE OF UTERUS      WISDOM TOOTH EXTRACTION         Family History   Problem Relation Age of Onset    Heart failure Mother     Colon polyps Mother     Hypothyroidism Sister          Medications have been verified.        Objective   /66   Pulse 56   Temp 98.7 °F (37.1 °C)   Resp 18   SpO2 99%   No LMP recorded.       Physical Exam     Physical Exam  Vitals and nursing note reviewed.   Constitutional:       Appearance: Normal appearance.   Cardiovascular:      Rate and Rhythm: Normal rate and regular rhythm.      Heart sounds: Normal heart sounds.   Pulmonary:      Breath sounds: Normal breath sounds. No wheezing.   Musculoskeletal:      Comments: No pain over left calf.  No pain over left ankle medial and lateral malleolus.  Negative anterior drawer and talar tilt of the left ankle.  No pain over left second, third, fourth, fifth metatarsals.  Pain over left first metatarsal.  Pain with resisted flexion and extension of left great toe.  Capillary refill intact in left foot.  Patient is neurovascularly  intact. Ecchymosis noted on left great toe   Neurological:      General: No focal deficit present.      Mental Status: She is alert and oriented to person, place, and time.   Psychiatric:         Mood and Affect: Mood normal.         Behavior: Behavior normal.

## 2024-11-15 NOTE — PATIENT INSTRUCTIONS
Patient was placed in left surgical shoe.  Patient was educated on icing and taking over-the-counter Tylenol and ibuprofen for pain control.  Patient was given a referral to orthopedics.  Patient was told she may come out of surgical shoe to do left ankle pumps.

## 2025-01-02 ENCOUNTER — TELEPHONE (OUTPATIENT)
Age: 40
End: 2025-01-02

## 2025-01-02 NOTE — TELEPHONE ENCOUNTER
Left message for patient regarding appointment 1/10/25. Dr Morales will not be in the office and we need to reschedule that appointment. New appointment time is January 17 at 8:20am - Virtual. If this date and time do not work please call the office back at [phone] to reschedule.

## 2025-01-08 LAB
ALBUMIN SERPL-MCNC: 4.5 G/DL (ref 3.9–4.9)
ALP SERPL-CCNC: 53 IU/L (ref 44–121)
ALT SERPL-CCNC: 15 IU/L (ref 0–32)
AST SERPL-CCNC: 23 IU/L (ref 0–40)
BASOPHILS # BLD AUTO: 0.1 X10E3/UL (ref 0–0.2)
BASOPHILS NFR BLD AUTO: 2 %
BILIRUB SERPL-MCNC: 0.5 MG/DL (ref 0–1.2)
BUN SERPL-MCNC: 14 MG/DL (ref 6–20)
BUN/CREAT SERPL: 19 (ref 9–23)
CALCIUM SERPL-MCNC: 9.3 MG/DL (ref 8.7–10.2)
CHLORIDE SERPL-SCNC: 105 MMOL/L (ref 96–106)
CO2 SERPL-SCNC: 22 MMOL/L (ref 20–29)
CREAT SERPL-MCNC: 0.74 MG/DL (ref 0.57–1)
EGFR: 105 ML/MIN/1.73
EOSINOPHIL # BLD AUTO: 0.3 X10E3/UL (ref 0–0.4)
EOSINOPHIL NFR BLD AUTO: 6 %
ERYTHROCYTE [DISTWIDTH] IN BLOOD BY AUTOMATED COUNT: 11.4 % (ref 11.7–15.4)
FERRITIN SERPL-MCNC: 63 NG/ML (ref 15–150)
FOLATE SERPL-MCNC: 12.7 NG/ML
GLOBULIN SER-MCNC: 2.1 G/DL (ref 1.5–4.5)
GLUCOSE SERPL-MCNC: 90 MG/DL (ref 70–99)
HCT VFR BLD AUTO: 40.4 % (ref 34–46.6)
HGB BLD-MCNC: 13.8 G/DL (ref 11.1–15.9)
IMM GRANULOCYTES # BLD: 0 X10E3/UL (ref 0–0.1)
IMM GRANULOCYTES NFR BLD: 0 %
IRON SATN MFR SERPL: 25 % (ref 15–55)
IRON SERPL-MCNC: 84 UG/DL (ref 27–159)
LDH SERPL-CCNC: 144 IU/L (ref 119–226)
LYMPHOCYTES # BLD AUTO: 2 X10E3/UL (ref 0.7–3.1)
LYMPHOCYTES NFR BLD AUTO: 41 %
MCH RBC QN AUTO: 32.8 PG (ref 26.6–33)
MCHC RBC AUTO-ENTMCNC: 34.2 G/DL (ref 31.5–35.7)
MCV RBC AUTO: 96 FL (ref 79–97)
MONOCYTES # BLD AUTO: 0.4 X10E3/UL (ref 0.1–0.9)
MONOCYTES NFR BLD AUTO: 8 %
NEUTROPHILS # BLD AUTO: 2.1 X10E3/UL (ref 1.4–7)
NEUTROPHILS NFR BLD AUTO: 43 %
PLATELET # BLD AUTO: 265 X10E3/UL (ref 150–450)
POTASSIUM SERPL-SCNC: 4.3 MMOL/L (ref 3.5–5.2)
PROT SERPL-MCNC: 6.6 G/DL (ref 6–8.5)
RBC # BLD AUTO: 4.21 X10E6/UL (ref 3.77–5.28)
SODIUM SERPL-SCNC: 142 MMOL/L (ref 134–144)
TIBC SERPL-MCNC: 336 UG/DL (ref 250–450)
UIBC SERPL-MCNC: 252 UG/DL (ref 131–425)
VIT B12 SERPL-MCNC: 642 PG/ML (ref 232–1245)
WBC # BLD AUTO: 4.8 X10E3/UL (ref 3.4–10.8)

## 2025-01-15 NOTE — PROGRESS NOTES
HEMATOLOGY / ONCOLOGY CLINIC FOLLOW UP NOTE    Patient Damir Shay  MRN: [id number]  : 1985  Date of Encounter 2025          Virtual Regular Visit  Name: Damir Shay      : 1985      MRN: [id number]  Encounter Provider: Lien Morales MD  Encounter Date: 2025   Encounter department: [facility] HEMATOLOGY [facility] SPECIAL[facility]      Verification of patient location:  Patient is located at Home in the following state in which I hold an active license [place] :  Assessment & Plan        Encounter provider Lien Morales MD    The patient was identified by name and date of birth. Damir Shay was informed that this is a telemedicine visit and that the visit is being conducted through the Epic Embedded platform. She agrees to proceed..  My office door was closed. No one else was in the room.  She acknowledged consent and understanding of privacy and security of the video platform. The patient has agreed to participate and understands they can discontinue the visit at any time.    Patient is aware this is a billable service.       Visit Time  Total Visit Duration: 20        Referring Provider:       Reason for Encounter: follow up iron deficiency anemia        Discussion    ID is not a final diagnosis; rather, it is indicative of an underlying etiology that is decreasing iron availability and/or increasing iron needs To effectively manage ID/NAVARRO, the underlying etiology must be identified and, if possible, treated.  the picture below outlines a number of key causes of ID/NAVARRO. In a woman of reproductive age, vaginal blood loss must be considered and explored with a detailed bleeding history.  GI blood loss or malabsorptive states must be considered in all patients. Although it is especially important in men and in all patients older than age 50 years, it is important to remember that celiac disease  and GI malignancy  can occur in both sexes at any age. Blood  donors are at risk of ID, and thus current or previous blood donation is an important element to obtain from the patient history  Additional considerations include detailed medication history (eg, oral anticoagulants, antacids, proton pump inhibitors) and dietary history.           Assessment / Plan:      Patient s a 39yF with Pmhx of IBS, Adenomyosis, exocrine insufficiency, PTSD, anxiety presents with several symptoms perimenopausal found to have ferritin 8 total iron 36, no % sat/TIBC done, Hgb 12 MCV 92 WBC 3.9        Iron Deficiency Anemia     Had long discussion about repleting iron stores with parenteral iron.  She has been on oral iron 325 mg every other day for 2 months with no change.  This is also a low dose and she has exocrine insufficiency and may not be absorbing iron in the face of continued iron loss.     Thus, 200 mg IV Venofer weekly for 6 weeks will be prescribed.  She will start within the week.  Tolerance is excellent with minimal chance of infusion reaction as this is iron sucrose     Venofer AE s as follows:     >10%:  Cardiovascular: Hypotension (children and adolescents: 2%; adults: HDD-CKD: 39%, NDD/PDD-CKD: 2% to 3%)   Gastrointestinal: Nausea (children and adolescents: 3%; adults: 5% to 15%)  Nervous system: Headache (children and adolescents: 6%; adults: HDD-CKD: 13%, NDD/PDD-CKD: 3% to 4%)  Neuromuscular & skeletal: Muscle cramps (HDD-CKD: 29%, NDD/PDD-CKD: <=3%)  Respiratory: Nasopharyngitis (<=16%) pharyngitis (<=16%), sinusitis (<=16%), upper respiratory tract infection (<=16%)     CBC with diff and iron panel will be repeated about 8 weeks after completion     She is in agreement with this approach    1/17/2025    Worsening perimenopausal symptoms with prolonged menses/blood loss    However Hgb stable 13.8 was 13.3 in Sept    Iron panel with total iron 84(124 in Sept 2024) ferritin 63 (107) % sat 25 (39) TIBC 336 (317)     Discussed getting IV Venofer now vs repeating labs in 2-3  months-will wait as Hgb stable and not symptomatic         Repeat all labs 2 months         Perimenopausal Symptoms     Patient with  hot flashes, fatigue, weight gain; mother had early menopause. Patient has menorrhagia as well as adenomyosis.     Gyn following, have suggested hormonal manipulation which the patient does not want to do.  May consider Mirena as well although she feels, if the same pattern as her mother, periods will stop within the year.  However, hormonal workup is being done.     Agree with checking TFTs based on her age     Continues with perimenopausal symptoms     Will check hormone levels but all symptoms consistent with menopause      Follow up     2 months           HPI:   3/27/2024     Damir Shay is a 39 y.o. year old female with history of IBS, PTSD, VitD def who presents today d/t concern for hormonal concerns. Patient reports she's been having issues with polydipsia, hot flashes and weight gain.   Patient had her A1C checked 12/23 was 5.3%, BG on CMP 94.  Patients last TFT 06/23 euthyroid     Patient reports having polyuria, polydipsia since last month. Denies any increased intake of caffeine, alcohol or energy drinks. Drinks only mildly caffenated beverages. Drinks water often since reports is thirsty. Wakes up 3-4x at night to urinate     Does report has adenomyosis of uterus for which she is following OB soon.      Also reports having issues with exocrine pancreatic insufficiency dx based on having diarrhea for 1 month and prescribed creon but she didn't take these are diarrhea now resolved. Had normal IBC now.      Reports also having hot flashes mainly at night, more often now. Also reports initially lost weight when having diarrhea and now regained it back and gained 10 more lbs. Denies any wheezing.      Reports anxiety as well getting worse.      Reports menses are 4-8 days irregular but still cycling every month.      Has been seen by GI, had EGD and found to have  gastritis     Was found to have Hgb 12 MCV 92 plts 257 WBC 3.9 total iron 36 ferritin 8 3/12/2024; last labs in Dec 2023 with ferritin 9 with no total iron done     She has been on low dose oral iron every other day with no improvement; dose is low but this adds to constipation and thus not taking it daily or bid.  Also unclear if using PPI/H2 blocker which changes the needed acid milieu for iron absorption.       She is here today regarding parenteral iron         Denies any recent illness/new medication use.  She has some SOB but not noticeable.  She is very fatigued.  She has no blood in stool/urine, does have heavy menses, no dysphagia, odynophagia, weight loss, B symptoms.  She is having hot flashes.       Family Hx- sister and mother have thyroid issues  Social Hx- uses marijuana, denies alcohol use anymore         Interval History:  9/27/2024     Continues to do well; iron levels remains acceptable.  Did have heavy period recently.  Continues with perimenopausal symptoms.  Last Venofer appears to be in May 2024                  Interval History: 1/17/2025    As above, menopausal  symptoms worse.  Peroids prolonged but flow not worse.  Is seeing Gyn.  Fatigued from night sweats         REVIEW OF SYSTEMS: as above   Please note that a 14-point review of systems was performed to include Constitutional, HEENT, Respiratory, CVS, GI, , Musculoskeletal, Integumentary, Neurologic, Rheumatologic, Endocrinologic, Psychiatric, Lymphatic, and Hematologic/Oncologic systems were reviewed and are negative unless otherwise stated in HPI. Positive and negative findings pertinent to this evaluation are incorporated into the history of present illness.      ECOG PS: 0    PROBLEM LIST:  Patient Active Problem List   Diagnosis    Cholelithiasis without obstruction    Chronic constipation    Hypothyroidism due to acquired atrophy of thyroid    Iron deficiency anemia due to chronic blood loss    Pulmonary nodule seen on imaging  study    Vitamin D deficiency    PTSD (post-traumatic stress disorder)    Medical marijuana use    Irritable bowel syndrome with diarrhea    Encounter for weight management    Epigastric pain    Anxiety    Adenomyosis of the uterus    Chronic superficial gastritis without bleeding    Low back strain, initial encounter       Past Medical History:   has a past medical history of Abnormal weight gain (10/30/2017), Cholelithiasis, COVID (10/10/2021), Elevated cortisol level (10/28/2021), Eustachian tube dysfunction, bilateral (10/28/2021), IBS (irritable bowel syndrome), Inflammatory bowel disease, Iron deficiency anemia, Menorrhagia, Overweight (BMI 25.0-29.9) (08/06/2022), and Palpitations.    PAST SURGICAL HISTORY:   has a past surgical history that includes [place] tooth extraction and Dilation and curettage of uterus.    CURRENT MEDICATIONS  Current Outpatient Medications   Medication Sig Dispense Refill    Multiple Vitamins-Minerals (MULTIVITAMIN ADULT) TABS Take 1 tablet by mouth      omeprazole (PriLOSEC) 20 mg delayed release capsule Take 1 capsule (20 mg total) by mouth daily 30 capsule 5    Sodium Fluoride 5000 PPM 1.1 % PSTE As needed       No current facility-administered medications for this visit.     [unfilled]    SOCIAL HISTORY:   reports that she quit smoking about 6 years ago. Her smoking use included cigarettes. She started smoking about 11 years ago. She has a 5 pack-year smoking history. She has been exposed to tobacco smoke. She has never used smokeless tobacco. She reports current alcohol use of about 2.0 standard drinks of alcohol per week. She reports current drug use. Frequency: 7.00 times per week. Drug: Marijuana.     FAMILY HISTORY:  family history includes Colon polyps in her mother; Heart failure in her mother; Hypothyroidism in her sister.     ALLERGIES:  has no known allergies.      [facility]   Vital Signs:   Labs:  Lab Results   Component Value Date    WBC 4.8 01/07/2025     HGB 13.8 01/07/2025    HCT 40.4 01/07/2025    MCV 96 01/07/2025     01/07/2025     Lab Results   Component Value Date     09/12/2017    SODIUM 142 01/07/2025    K 4.3 01/07/2025     01/07/2025    CO2 22 01/07/2025    AGAP 4 06/03/2024    BUN 14 01/07/2025    CREATININE 0.74 01/07/2025    GLUC 90 01/07/2025    CALCIUM 9.1 06/03/2024    AST 23 01/07/2025    ALT 15 01/07/2025    ALKPHOS 34 06/03/2024    PROT 6.4 09/12/2017    TP 6.6 01/07/2025    BILITOT 0.6 09/12/2017    TBILI 0.5 01/07/2025    EGFR 105 01/07/2025           I spent 20 minutes on chart review, face to face counseling time, coordination of care and documentation.    Lien Morales MD PhD

## 2025-01-17 ENCOUNTER — TELEMEDICINE (OUTPATIENT)
Age: 40
End: 2025-01-17
Payer: COMMERCIAL

## 2025-01-17 ENCOUNTER — TELEPHONE (OUTPATIENT)
Dept: HEMATOLOGY ONCOLOGY | Facility: CLINIC | Age: 40
End: 2025-01-17

## 2025-01-17 DIAGNOSIS — D50.0 IRON DEFICIENCY ANEMIA DUE TO CHRONIC BLOOD LOSS: Primary | ICD-10-CM

## 2025-01-17 PROCEDURE — 99213 OFFICE O/P EST LOW 20 MIN: CPT | Performed by: INTERNAL MEDICINE

## 2025-01-17 NOTE — TELEPHONE ENCOUNTER
Called and left voicemail to check patient out of virtual visit today. Scheduled patient for a 2 MO FU with labs prior to this visit. Informed that if the date and time doesn't work to call the office and we can assist with rescheduling.

## 2025-03-19 LAB
BASOPHILS # BLD AUTO: 0.1 X10E3/UL (ref 0–0.2)
BASOPHILS NFR BLD AUTO: 1 %
EOSINOPHIL # BLD AUTO: 0.2 X10E3/UL (ref 0–0.4)
EOSINOPHIL NFR BLD AUTO: 3 %
ERYTHROCYTE [DISTWIDTH] IN BLOOD BY AUTOMATED COUNT: 12.2 % (ref 11.7–15.4)
ESTRADIOL SERPL-MCNC: 9.4 PG/ML
FERRITIN SERPL-MCNC: 41 NG/ML (ref 15–150)
FSH SERPL-ACNC: 21.9 MIU/ML
HCT VFR BLD AUTO: 37.2 % (ref 34–46.6)
HGB BLD-MCNC: 12.5 G/DL (ref 11.1–15.9)
IMM GRANULOCYTES # BLD: 0 X10E3/UL (ref 0–0.1)
IMM GRANULOCYTES NFR BLD: 0 %
IRON SATN MFR SERPL: 20 % (ref 15–55)
IRON SERPL-MCNC: 64 UG/DL (ref 27–159)
LH SERPL-ACNC: 15.2 MIU/ML
LYMPHOCYTES # BLD AUTO: 1.3 X10E3/UL (ref 0.7–3.1)
LYMPHOCYTES NFR BLD AUTO: 27 %
MCH RBC QN AUTO: 32.2 PG (ref 26.6–33)
MCHC RBC AUTO-ENTMCNC: 33.6 G/DL (ref 31.5–35.7)
MCV RBC AUTO: 96 FL (ref 79–97)
MONOCYTES # BLD AUTO: 0.3 X10E3/UL (ref 0.1–0.9)
MONOCYTES NFR BLD AUTO: 7 %
NEUTROPHILS # BLD AUTO: 3 X10E3/UL (ref 1.4–7)
NEUTROPHILS NFR BLD AUTO: 62 %
PLATELET # BLD AUTO: 264 X10E3/UL (ref 150–450)
RBC # BLD AUTO: 3.88 X10E6/UL (ref 3.77–5.28)
TIBC SERPL-MCNC: 317 UG/DL (ref 250–450)
UIBC SERPL-MCNC: 253 UG/DL (ref 131–425)
WBC # BLD AUTO: 4.9 X10E3/UL (ref 3.4–10.8)

## 2025-04-02 NOTE — PROGRESS NOTES
Name: Damir Shay      : 1985      MRN: [id number]  Encounter Provider: Lien Morales MD  Encounter Date: 2025   Encounter department: [facility] HEMATOLOGY ONCOLOGY SPECIAL[facility]  :  Assessment & Plan  Iron deficiency anemia due to chronic blood loss    Patient is a 40 year old female with Pmhx of IBS, Adenomyosis, exocrine insufficiency, PTSD, anxiety presents with several symptoms perimenopausal found to have ferritin 8 total iron 36, no % sat/TIBC done, Hgb 12 MCV 92 WBC 3.9        Had long discussion about repleting iron stores with parenteral iron.  She has been on oral iron 325 mg every other day for 2 months with no change.  This is also a low dose and she has exocrine insufficiency and may not be absorbing iron in the face of continued iron loss.     Thus, 200 mg IV Venofer weekly for 6 weeks will be prescribed.  She will start within the week.  Tolerance is excellent with minimal chance of infusion reaction as this is iron sucrose     Venofer AE s as follows:     >10%:  Cardiovascular: Hypotension (children and adolescents: 2%; adults: HDD-CKD: 39%, NDD/PDD-CKD: 2% to 3%)   Gastrointestinal: Nausea (children and adolescents: 3%; adults: 5% to 15%)  Nervous system: Headache (children and adolescents: 6%; adults: HDD-CKD: 13%, NDD/PDD-CKD: 3% to 4%)  Neuromuscular & skeletal: Muscle cramps (HDD-CKD: 29%, NDD/PDD-CKD: <=3%)  Respiratory: Nasopharyngitis (<=16%) pharyngitis (<=16%), sinusitis (<=16%), upper respiratory tract infection (<=16%)     CBC with diff and iron panel will be repeated about 8 weeks after completion     She is in agreement with this approach     2025     Worsening perimenopausal symptoms with prolonged menses/blood loss     However Hgb stable 13.8 was 13.3 in Sept     Iron panel with total iron 84(124 in 2024) ferritin 63 (107) % sat 25 (39) TIBC 336 (317)      Discussed getting IV Venofer now vs repeating labs in 2-3 months-will wait as  Hgb stable and not symptomatic         Repeat all labs 2 months     4/4/2025    Decreasing energy; had full period last month despite estradiol 9.4 LH 15 and FSH 22-she remains perimenopausal    Labs 3/18/2025 with total iron 64 (84) ferritin 41 (63) and % sat 20 (26) TIBC 317    Hgb 12.5 MCV 96 WBC 4.9 plts 262; last Hgb Sivakumar 13.8 MCV 96    Will retreat with Venofer as symptomatic-200 mg IV x 6 doses-will either get weekly or twice weekly x 3 weeks-she will let us know         Perimenopausal Symptoms     Patient with  hot flashes, fatigue, weight gain; mother had early menopause. Patient has menorrhagia as well as adenomyosis.     Gyn following, have suggested hormonal manipulation which the patient does not want to do.  May consider Mirena as well although she feels, if the same pattern as her mother, periods will stop within the year.  However, hormonal workup is being done.     Agree with checking TFTs based on her age     Continues with perimenopausal symptoms      Will check hormone levels but all symptoms consistent with menopause     4/4/2025    Perimenopausal; estradiol 9.4 FSH 22 LH 15     Vitamin B12 deficiency    B12 not recently done -was done-642     Summary/Recommendations    Venofer    Repeat labs 3 months    Follow up 3 months    History of Present Illness   No chief complaint on file.  3/27/2024     Damir Shay is a 39 y.o. year old female with history of IBS, PTSD, VitD def who presents today d/t concern for hormonal concerns. Patient reports she's been having issues with polydipsia, hot flashes and weight gain.   Patient had her A1C checked 12/23 was 5.3%, BG on CMP 94.  Patients last TFT 06/23 euthyroid     Patient reports having polyuria, polydipsia since last month. Denies any increased intake of caffeine, alcohol or energy drinks. Drinks only mildly caffenated beverages. Drinks water often since reports is thirsty. Wakes up 3-4x at night to urinate     Does report has adenomyosis of  uterus for which she is following OB soon.      Also reports having issues with exocrine pancreatic insufficiency dx based on having diarrhea for 1 month and prescribed creon but she didn't take these are diarrhea now resolved. Had normal IBC now.      Reports also having hot flashes mainly at night, more often now. Also reports initially lost weight when having diarrhea and now regained it back and gained 10 more lbs. Denies any wheezing.      Reports anxiety as well getting worse.      Reports menses are 4-8 days irregular but still cycling every month.      Has been seen by GI, had EGD and found to have gastritis     Was found to have Hgb 12 MCV 92 plts 257 WBC 3.9 total iron 36 ferritin 8 3/12/2024; last labs in Dec 2023 with ferritin 9 with no total iron done     She has been on low dose oral iron every other day with no improvement; dose is low but this adds to constipation and thus not taking it daily or bid.  Also unclear if using PPI/H2 blocker which changes the needed acid milieu for iron absorption.       She is here today regarding parenteral iron         Denies any recent illness/new medication use.  She has some SOB but not noticeable.  She is very fatigued.  She has no blood in stool/urine, does have heavy menses, no dysphagia, odynophagia, weight loss, B symptoms.  She is having hot flashes.       Family Hx- sister and mother have thyroid issues  Social Hx- uses marijuana, denies alcohol use anymore         Interval History:  9/27/2024     Continues to do well; iron levels remains acceptable.  Did have heavy period recently.  Continues with perimenopausal symptoms.  Last Venofer appears to be in May 2024            Interval History: 1/17/2025     As above, menopausal  symptoms worse.  Peroids prolonged but flow not worse.  Is seeing Gyn.  Fatigued from night sweats      4/4/2025    Doing well except for fatigue.  Is perimenopausal but continues to have intermittent full periods, not regular.  Hormone  levels perimenopausal.  Labs as above.  Feels more fatigue.     Pertinent Medical History        Review of Systems   Constitutional:  Positive for fatigue.   All other systems reviewed and are negative.          Objective   There were no vitals taken for this visit.    Pain Screening:     ECOG   0  Physical Exam not done telemedicine     Labs: I have reviewed the following labs:  Lab Results   Component Value Date/Time    White Blood Cell Count 4.9 03/18/2025 11:23 AM    Red Blood Cell Count 3.88 03/18/2025 11:23 AM    Hemoglobin 12.5 03/18/2025 11:23 AM    HCT 37.2 03/18/2025 11:23 AM    MCV 96 03/18/2025 11:23 AM    MCH 32.2 03/18/2025 11:23 AM    RDW 12.2 03/18/2025 11:23 AM    Platelet Count 264 03/18/2025 11:23 AM    Neutrophils 62 03/18/2025 11:23 AM    Lymphocytes 27 03/18/2025 11:23 AM    Monocytes 7 03/18/2025 11:23 AM    Eosinophils 3 03/18/2025 11:23 AM    Neutrophils (Absolute) 3.0 03/18/2025 11:23 AM     Lab Results   Component Value Date/Time    Potassium 4.3 01/07/2025 10:16 AM    Chloride 105 01/07/2025 10:16 AM    CO2 22 01/07/2025 10:16 AM    BUN 14 01/07/2025 10:16 AM    Creatinine 0.74 01/07/2025 10:16 AM    AST 23 01/07/2025 10:16 AM    ALT 15 01/07/2025 10:16 AM    Protein, Total 6.6 01/07/2025 10:16 AM    Albumin 4.5 01/07/2025 10:16 AM    TOTAL BILIRUBIN 0.5 01/07/2025 10:16 AM    eGFR 105 01/07/2025 10:16 AM           Administrative Statements   Encounter provider Lien Morales MD    The Patient is located at Home and in the following state in which I hold an active license [place].    The patient was identified by name and date of birth. Damir Shay was informed that this is a telemedicine visit and that the visit is being conducted through the Epic Embedded platform. She agrees to proceed..  My office door was closed. No one else was in the room.  She acknowledged consent and understanding of privacy and security of the video platform. The patient has agreed to participate and  understands they can discontinue the visit at any time.    I have spent a total time of 30 minutes in caring for this patient on the day of the visit/encounter including Impressions, Counseling / Coordination of care, Documenting in the medical record, Reviewing/placing orders in the medical record (including tests, medications, and/or procedures), and Obtaining or reviewing history  , not including the time spent for establishing the audio/video connection.

## 2025-04-02 NOTE — ASSESSMENT & PLAN NOTE
Patient is a 40 year old female with Pmhx of IBS, Adenomyosis, exocrine insufficiency, PTSD, anxiety presents with several symptoms perimenopausal found to have ferritin 8 total iron 36, no % sat/TIBC done, Hgb 12 MCV 92 WBC 3.9        Had long discussion about repleting iron stores with parenteral iron.  She has been on oral iron 325 mg every other day for 2 months with no change.  This is also a low dose and she has exocrine insufficiency and may not be absorbing iron in the face of continued iron loss.     Thus, 200 mg IV Venofer weekly for 6 weeks will be prescribed.  She will start within the week.  Tolerance is excellent with minimal chance of infusion reaction as this is iron sucrose     Venofer AE s as follows:     >10%:  Cardiovascular: Hypotension (children and adolescents: 2%; adults: HDD-CKD: 39%, NDD/PDD-CKD: 2% to 3%)   Gastrointestinal: Nausea (children and adolescents: 3%; adults: 5% to 15%)  Nervous system: Headache (children and adolescents: 6%; adults: HDD-CKD: 13%, NDD/PDD-CKD: 3% to 4%)  Neuromuscular & skeletal: Muscle cramps (HDD-CKD: 29%, NDD/PDD-CKD: <=3%)  Respiratory: Nasopharyngitis (<=16%) pharyngitis (<=16%), sinusitis (<=16%), upper respiratory tract infection (<=16%)     CBC with diff and iron panel will be repeated about 8 weeks after completion     She is in agreement with this approach     1/17/2025     Worsening perimenopausal symptoms with prolonged menses/blood loss     However Hgb stable 13.8 was 13.3 in Sept     Iron panel with total iron 84(124 in Sept 2024) ferritin 63 (107) % sat 25 (39) TIBC 336 (317)      Discussed getting IV Venofer now vs repeating labs in 2-3 months-will wait as Hgb stable and not symptomatic         Repeat all labs 2 months     4/4/2025    Decreasing energy; had full period last month despite estradiol 9.4 LH 15 and FSH 22-she remains perimenopausal    Labs 3/18/2025 with total iron 64 (84) ferritin 41 (63) and % sat 20 (26) TIBC 317    Hgb 12.5 MCV  96 WBC 4.9 plts 262; last Hgb Sivakumar 13.8 MCV 96    Will retreat with Venofer as symptomatic-200 mg IV x 6 doses-will either get weekly or twice weekly x 3 weeks-she will let us know         Perimenopausal Symptoms     Patient with  hot flashes, fatigue, weight gain; mother had early menopause. Patient has menorrhagia as well as adenomyosis.     Gyn following, have suggested hormonal manipulation which the patient does not want to do.  May consider Mirena as well although she feels, if the same pattern as her mother, periods will stop within the year.  However, hormonal workup is being done.     Agree with checking TFTs based on her age     Continues with perimenopausal symptoms      Will check hormone levels but all symptoms consistent with menopause     4/4/2025    Perimenopausal; estradiol 9.4 FSH 22 LH 15

## 2025-04-04 ENCOUNTER — TELEPHONE (OUTPATIENT)
Age: 40
End: 2025-04-04

## 2025-04-04 ENCOUNTER — TELEMEDICINE (OUTPATIENT)
Age: 40
End: 2025-04-04
Payer: COMMERCIAL

## 2025-04-04 DIAGNOSIS — D50.0 IRON DEFICIENCY ANEMIA DUE TO CHRONIC BLOOD LOSS: Primary | ICD-10-CM

## 2025-04-04 DIAGNOSIS — E53.8 VITAMIN B12 DEFICIENCY: ICD-10-CM

## 2025-04-04 PROCEDURE — 99214 OFFICE O/P EST MOD 30 MIN: CPT | Performed by: INTERNAL MEDICINE

## 2025-04-04 RX ORDER — SODIUM CHLORIDE 9 MG/ML
20 INJECTION, SOLUTION INTRAVENOUS ONCE
Status: CANCELLED | OUTPATIENT
Start: 2025-04-04

## 2025-04-04 NOTE — TELEPHONE ENCOUNTER
Left voicemail for patient to inform her that dr. Morales would like to see her back in 3 months with labs and infusion. I informed her that I sent a message to the infusion center for her appointment and scheduled her for 7/9/2025 @ 8am virtually with Dr. Morales. If the date and time doesn't work to please call the office and we can assist with rescheduling.

## 2025-04-08 ENCOUNTER — DOCUMENTATION (OUTPATIENT)
Dept: HEMATOLOGY ONCOLOGY | Facility: CLINIC | Age: 40
End: 2025-04-08

## 2025-04-08 DIAGNOSIS — D50.0 IRON DEFICIENCY ANEMIA DUE TO CHRONIC BLOOD LOSS: Primary | ICD-10-CM

## 2025-04-08 RX ORDER — SODIUM CHLORIDE 9 MG/ML
20 INJECTION, SOLUTION INTRAVENOUS ONCE
Status: CANCELLED | OUTPATIENT
Start: 2025-04-08

## 2025-04-08 NOTE — PROGRESS NOTES
Oncology Finance Advocacy Intake and Intervention    Oncology Finance Counselor/Advocate placed call to patient. This writer informed patient that this writer is here to assist patient with billing questions, financial assistance, payment/payment plans, quotes, copayment assistance, insurance optimization, and insurance navigation.      This writer conducted a thorough benefit review of copayment, deductible, and out of pocket cost. This information is documented below and has been reviewed with patient.     Copayment: $35.00  Deductible: $200.00 / $34.17 Remaining  Out Of Pocket Cost: $2,500.00 / $2,299.17 Remaining  Insurance Optimization (Limited Benefit Vs Self-Pay): N/A    Interventions:   Outreached PT to discuss financial assistance opportunities. PT did not answer. Voicemail was left detailing the reason for the call, this writer's contact information, and a high-level overview of options.   PT returned call at 4:00. Pt was provided information above but wanted exact quote of iron infusions. PT transferred to .  Information above was review thoroughly with patient and patient was advise of possible assistance programs/interventions. If any question arise patient can contact this writer at below information. This information was given to patient at time of contact.      Alecia Garvey MPH  Phone: [phone]  Email: [email or web address]

## 2025-04-15 DIAGNOSIS — D50.0 IRON DEFICIENCY ANEMIA DUE TO CHRONIC BLOOD LOSS: Primary | ICD-10-CM

## 2025-04-15 RX ORDER — SODIUM CHLORIDE 9 MG/ML
20 INJECTION, SOLUTION INTRAVENOUS ONCE
Status: CANCELLED | OUTPATIENT
Start: 2025-04-17

## 2025-04-17 ENCOUNTER — HOSPITAL ENCOUNTER (OUTPATIENT)
Dept: INFUSION CENTER | Facility: HOSPITAL | Age: 40
Discharge: HOME | End: 2025-04-17
Attending: INTERNAL MEDICINE
Payer: COMMERCIAL

## 2025-04-17 VITALS
RESPIRATION RATE: 16 BRPM | SYSTOLIC BLOOD PRESSURE: 121 MMHG | OXYGEN SATURATION: 100 % | TEMPERATURE: 98 F | HEART RATE: 66 BPM | DIASTOLIC BLOOD PRESSURE: 71 MMHG

## 2025-04-17 DIAGNOSIS — D50.0 IRON DEFICIENCY ANEMIA DUE TO CHRONIC BLOOD LOSS: Primary | ICD-10-CM

## 2025-04-17 PROCEDURE — 96365 THER/PROPH/DIAG IV INF INIT: CPT

## 2025-04-17 RX ORDER — SODIUM CHLORIDE 9 MG/ML
20 INJECTION, SOLUTION INTRAVENOUS ONCE
Status: COMPLETED | OUTPATIENT
Start: 2025-04-17 | End: 2025-04-17

## 2025-04-17 RX ORDER — SODIUM CHLORIDE 9 MG/ML
20 INJECTION, SOLUTION INTRAVENOUS ONCE
Status: CANCELLED | OUTPATIENT
Start: 2025-04-24

## 2025-04-17 RX ADMIN — IRON SUCROSE 200 MG: 20 INJECTION, SOLUTION INTRAVENOUS at 09:26

## 2025-04-17 RX ADMIN — SODIUM CHLORIDE 20 ML/HR: 9 INJECTION, SOLUTION INTRAVENOUS at 09:25

## 2025-04-17 NOTE — PROGRESS NOTES
Damir Shay  tolerated treatment well with no complications.      Damir Shay is aware of future appt on 4/24 at 0900.     AVS printed and given to Damir Shay:    No (Declined by Damir Shay)

## 2025-04-23 ENCOUNTER — OFFICE VISIT (OUTPATIENT)
Age: 40
End: 2025-04-23
Payer: COMMERCIAL

## 2025-04-23 VITALS — WEIGHT: 152 LBS | HEIGHT: 63 IN | TEMPERATURE: 99.2 F | BODY MASS INDEX: 26.93 KG/M2

## 2025-04-23 DIAGNOSIS — D18.01 CHERRY ANGIOMA: ICD-10-CM

## 2025-04-23 DIAGNOSIS — D22.70 MULTIPLE BENIGN NEVI OF UPPER EXTREMITY, LOWER EXTREMITY, AND TRUNK: Primary | ICD-10-CM

## 2025-04-23 DIAGNOSIS — D22.60 MULTIPLE BENIGN NEVI OF UPPER EXTREMITY, LOWER EXTREMITY, AND TRUNK: Primary | ICD-10-CM

## 2025-04-23 DIAGNOSIS — D22.5 MULTIPLE BENIGN NEVI OF UPPER EXTREMITY, LOWER EXTREMITY, AND TRUNK: Primary | ICD-10-CM

## 2025-04-23 PROCEDURE — 99202 OFFICE O/P NEW SF 15 MIN: CPT | Performed by: DERMATOLOGY

## 2025-04-23 NOTE — PATIENT INSTRUCTIONS
MELANOCYTIC NEVI  -Relevant exam: face, trunk and extremities. Scattered over the trunk/extremities are homogenously pigmented brown macules and papules. ELM performed and without concerning findings.  - Exam and clinical history consistent with melanocytic nevi  - Educated on the ABCDE's of melanoma; handout provided  - Counseled to return to clinic prior to scheduled appointment should any of these lesions change or should any new lesions of concern arise  - Counseled on use of sun protection daily. Reviewed latest FDA sunscreen guidelines, including use of broad spectrum (UVA and UVB blocking) sunscreen or sun protective clothing with SPF 30-50 every 2-3 hours and reapplied after exposure to water; use of photoprotective clothing, including a broad brim hat and UPF rated clothing if outdoors for several hours; avoid use of tanning beds as these pose significant risk for melanoma and skin cancer.    CHERRY ANGIOMAS  - Relevant exam: Chest.  Scattered over the trunk/extremities are red papules  - Exam and clinical history consistent with cherry angiomas  - Educated that these are benign  - Educated that removal is considered aesthetic and would incur a fee.  - Patient does not wish to pursue removal at this time but will contact us should this change.  .  NO evidence of cutaneous malignancy on TBE

## 2025-04-23 NOTE — PROGRESS NOTES
"[facility] Dermatology Clinic Note     Patient Name: Damir Shay  Encounter Date: 04/23/2025       Have you been cared for by a [facility] Dermatologist in the last 3 years and, if so, which description applies to you? NO. I am considered a \"new\" patient and must complete all patient intake questions. I am of child-bearing potential.     REVIEW OF SYSTEMS:  Have you recently had or currently have any of the following? Recent fever or chills? No  Any non-healing wound? No  Are you pregnant or planning to become pregnant? No  Are you currently or planning to be nursing or breast feeding? No   PAST MEDICAL HISTORY:  Have you personally ever had or currently have any of the following?  If \"YES,\" then please provide more detail. Skin cancer (such as Melanoma, Basal Cell Carcinoma, Squamous Cell Carcinoma?  No  Tuberculosis, HIV/AIDS, Hepatitis B or C: No  Radiation Treatment No   HISTORY OF IMMUNOSUPPRESSION:   Do you have a history of any of the following:  Systemic Immunosuppression such as Diabetes, Biologic or Immunotherapy, Chemotherapy, Organ Transplantation, Bone Marrow Transplantation or Prednsione?  No    Answering \"YES\" requires the addition of the dotphrase \"IMMUNOSUPPRESSED\" as the first diagnosis of the patient's visit.   FAMILY HISTORY:  Any \"first degree relatives\" (parent, brother, sister, or child) with the following?    Skin Cancer, Pancreatic or Other Cancer? No   PATIENT EXPERIENCE:    Do you want the Dermatologist to perform a COMPLETE skin exam today including a clinical examination under the \"bra and underwear\" areas?  Yes  If necessary, do we have your permission to call and leave a detailed message on your Preferred Phone number that includes your specific medical information?  Yes      No Known Allergies   Current Outpatient Medications:   •  Multiple Vitamins-Minerals (MULTIVITAMIN ADULT) TABS, Take 1 tablet by mouth, Disp: , Rfl:   •  omeprazole (PriLOSEC) 20 mg delayed release capsule, " Take 1 capsule (20 mg total) by mouth daily, Disp: 30 capsule, Rfl: 5  •  Sodium Fluoride 5000 PPM 1.1 % PSTE, As needed, Disp: , Rfl:               Whom besides the patient is providing clinical information about today's encounter?   NO ADDITIONAL HISTORIAN (patient alone provided history)    Physical Exam and Assessment/Plan by Diagnosis:    MELANOCYTIC NEVI  -Relevant exam: face, trunk and extremities. Scattered over the trunk/extremities are homogenously pigmented brown macules and papules. ELM performed and without concerning findings.  - Exam and clinical history consistent with melanocytic nevi  - Educated on the ABCDE's of melanoma; handout provided  - Counseled to return to clinic prior to scheduled appointment should any of these lesions change or should any new lesions of concern arise  - Counseled on use of sun protection daily. Reviewed latest FDA sunscreen guidelines, including use of broad spectrum (UVA and UVB blocking) sunscreen or sun protective clothing with SPF 30-50 every 2-3 hours and reapplied after exposure to water; use of photoprotective clothing, including a broad brim hat and UPF rated clothing if outdoors for several hours; avoid use of tanning beds as these pose significant risk for melanoma and skin cancer.    CHERRY ANGIOMAS  - Relevant exam: Chest.  Scattered over the trunk/extremities are red papules  - Exam and clinical history consistent with cherry angiomas  - Educated that these are benign  - Educated that removal is considered aesthetic and would incur a fee.  - Patient does not wish to pursue removal at this time but will contact us should this change.  .  NO evidence of cutaneous malignancy on TBE   Scribe Attestation    I,:  Yuli Estrada MA am acting as a scribe while in the presence of the attending physician.:       I,:  Sunni Davis MD personally performed the services described in this documentation    as scribed in my presence.:

## 2025-04-24 ENCOUNTER — HOSPITAL ENCOUNTER (OUTPATIENT)
Dept: INFUSION CENTER | Facility: HOSPITAL | Age: 40
Discharge: HOME | End: 2025-04-24
Attending: INTERNAL MEDICINE
Payer: COMMERCIAL

## 2025-04-24 VITALS
DIASTOLIC BLOOD PRESSURE: 66 MMHG | RESPIRATION RATE: 18 BRPM | HEART RATE: 66 BPM | TEMPERATURE: 97.9 F | OXYGEN SATURATION: 100 % | SYSTOLIC BLOOD PRESSURE: 101 MMHG

## 2025-04-24 DIAGNOSIS — D50.0 IRON DEFICIENCY ANEMIA DUE TO CHRONIC BLOOD LOSS: Primary | ICD-10-CM

## 2025-04-24 PROCEDURE — 96365 THER/PROPH/DIAG IV INF INIT: CPT

## 2025-04-24 RX ORDER — SODIUM CHLORIDE 9 MG/ML
20 INJECTION, SOLUTION INTRAVENOUS ONCE
Status: COMPLETED | OUTPATIENT
Start: 2025-04-24 | End: 2025-04-24

## 2025-04-24 RX ORDER — SODIUM CHLORIDE 9 MG/ML
20 INJECTION, SOLUTION INTRAVENOUS ONCE
Status: CANCELLED | OUTPATIENT
Start: 2025-05-01

## 2025-04-24 RX ADMIN — IRON SUCROSE 200 MG: 20 INJECTION, SOLUTION INTRAVENOUS at 09:52

## 2025-04-24 RX ADMIN — SODIUM CHLORIDE 20 ML/HR: 9 INJECTION, SOLUTION INTRAVENOUS at 09:51

## 2025-04-24 NOTE — PROGRESS NOTES
Damir Shay  tolerated treatment well with no complications.      Damir Shay is aware of future appt on 5/1 at 0900.     AVS printed and given to Damir Shay:    No (Declined by Damir Shay)

## 2025-05-01 ENCOUNTER — HOSPITAL ENCOUNTER (OUTPATIENT)
Dept: INFUSION CENTER | Facility: HOSPITAL | Age: 40
Discharge: HOME | End: 2025-05-01
Attending: INTERNAL MEDICINE
Payer: COMMERCIAL

## 2025-05-01 VITALS
SYSTOLIC BLOOD PRESSURE: 108 MMHG | TEMPERATURE: 97.6 F | HEART RATE: 64 BPM | DIASTOLIC BLOOD PRESSURE: 66 MMHG | RESPIRATION RATE: 16 BRPM | OXYGEN SATURATION: 100 %

## 2025-05-01 DIAGNOSIS — D50.0 IRON DEFICIENCY ANEMIA DUE TO CHRONIC BLOOD LOSS: Primary | ICD-10-CM

## 2025-05-01 PROCEDURE — 96365 THER/PROPH/DIAG IV INF INIT: CPT

## 2025-05-01 RX ORDER — SODIUM CHLORIDE 9 MG/ML
20 INJECTION, SOLUTION INTRAVENOUS ONCE
Status: CANCELLED | OUTPATIENT
Start: 2025-05-08

## 2025-05-01 RX ORDER — SODIUM CHLORIDE 9 MG/ML
20 INJECTION, SOLUTION INTRAVENOUS ONCE
Status: COMPLETED | OUTPATIENT
Start: 2025-05-01 | End: 2025-05-01

## 2025-05-01 RX ADMIN — SODIUM CHLORIDE 20 ML/HR: 9 INJECTION, SOLUTION INTRAVENOUS at 09:25

## 2025-05-01 RX ADMIN — SODIUM CHLORIDE 200 MG: 9 INJECTION, SOLUTION INTRAVENOUS at 09:25

## 2025-05-01 NOTE — PROGRESS NOTES
..Damir Shay  tolerated treatment well with no complications.      Damir Shay is aware of future appt on 5/8 at 9:00.     AVS printed and given to Damir Shay:  No (Declined by Damir Shay)

## 2025-05-08 ENCOUNTER — HOSPITAL ENCOUNTER (OUTPATIENT)
Dept: INFUSION CENTER | Facility: HOSPITAL | Age: 40
Discharge: HOME | End: 2025-05-08
Attending: INTERNAL MEDICINE
Payer: COMMERCIAL

## 2025-05-08 VITALS
DIASTOLIC BLOOD PRESSURE: 60 MMHG | HEART RATE: 70 BPM | RESPIRATION RATE: 16 BRPM | SYSTOLIC BLOOD PRESSURE: 109 MMHG | TEMPERATURE: 97.4 F

## 2025-05-08 DIAGNOSIS — D50.0 IRON DEFICIENCY ANEMIA DUE TO CHRONIC BLOOD LOSS: Primary | ICD-10-CM

## 2025-05-08 PROCEDURE — 96365 THER/PROPH/DIAG IV INF INIT: CPT

## 2025-05-08 RX ORDER — SODIUM CHLORIDE 9 MG/ML
20 INJECTION, SOLUTION INTRAVENOUS ONCE
Status: CANCELLED | OUTPATIENT
Start: 2025-05-15

## 2025-05-08 RX ORDER — SODIUM CHLORIDE 9 MG/ML
20 INJECTION, SOLUTION INTRAVENOUS ONCE
Status: COMPLETED | OUTPATIENT
Start: 2025-05-08 | End: 2025-05-08

## 2025-05-08 RX ADMIN — SODIUM CHLORIDE 200 MG: 9 INJECTION, SOLUTION INTRAVENOUS at 09:12

## 2025-05-08 RX ADMIN — SODIUM CHLORIDE 20 ML/HR: 9 INJECTION, SOLUTION INTRAVENOUS at 09:12

## 2025-05-08 NOTE — PROGRESS NOTES
Damir Shay  tolerated treatment well with no complications.      Damir Shay is aware of future appt on 5/15 at 9am.     AVS printed and given to Damir Shay:  No (Declined by Damir Shay)

## 2025-05-15 ENCOUNTER — HOSPITAL ENCOUNTER (OUTPATIENT)
Dept: INFUSION CENTER | Facility: HOSPITAL | Age: 40
Discharge: HOME | End: 2025-05-15
Attending: INTERNAL MEDICINE
Payer: COMMERCIAL

## 2025-05-15 VITALS
OXYGEN SATURATION: 100 % | HEART RATE: 72 BPM | RESPIRATION RATE: 17 BRPM | TEMPERATURE: 96.6 F | DIASTOLIC BLOOD PRESSURE: 87 MMHG | SYSTOLIC BLOOD PRESSURE: 123 MMHG

## 2025-05-15 DIAGNOSIS — D50.0 IRON DEFICIENCY ANEMIA DUE TO CHRONIC BLOOD LOSS: Primary | ICD-10-CM

## 2025-05-15 RX ORDER — SODIUM CHLORIDE 9 MG/ML
20 INJECTION, SOLUTION INTRAVENOUS ONCE
Status: CANCELLED | OUTPATIENT
Start: 2025-05-22

## 2025-05-15 RX ORDER — SODIUM CHLORIDE 9 MG/ML
20 INJECTION, SOLUTION INTRAVENOUS ONCE
Status: COMPLETED | OUTPATIENT
Start: 2025-05-15 | End: 2025-05-15

## 2025-05-15 RX ADMIN — SODIUM CHLORIDE 20 ML/HR: 9 INJECTION, SOLUTION INTRAVENOUS at 09:23

## 2025-05-15 RX ADMIN — IRON SUCROSE 200 MG: 20 INJECTION, SOLUTION INTRAVENOUS at 09:25

## 2025-05-15 NOTE — PROGRESS NOTES
Damir Shay  tolerated treatment well with no complications.      Damir Shay is aware of future appt on 5/22/25   at 0900.     AVS printed and given to Damir Shay:  No (Declined by Damir Shay)

## 2025-05-22 ENCOUNTER — HOSPITAL ENCOUNTER (OUTPATIENT)
Dept: INFUSION CENTER | Facility: HOSPITAL | Age: 40
Discharge: HOME | End: 2025-05-22
Attending: INTERNAL MEDICINE
Payer: COMMERCIAL

## 2025-05-22 VITALS
RESPIRATION RATE: 17 BRPM | DIASTOLIC BLOOD PRESSURE: 75 MMHG | HEART RATE: 56 BPM | TEMPERATURE: 97.4 F | SYSTOLIC BLOOD PRESSURE: 115 MMHG | OXYGEN SATURATION: 100 %

## 2025-05-22 DIAGNOSIS — D50.0 IRON DEFICIENCY ANEMIA DUE TO CHRONIC BLOOD LOSS: Primary | ICD-10-CM

## 2025-05-22 PROCEDURE — 96365 THER/PROPH/DIAG IV INF INIT: CPT

## 2025-05-22 RX ORDER — SODIUM CHLORIDE 9 MG/ML
20 INJECTION, SOLUTION INTRAVENOUS ONCE
Status: CANCELLED | OUTPATIENT
Start: 2025-05-29

## 2025-05-22 RX ORDER — SODIUM CHLORIDE 9 MG/ML
20 INJECTION, SOLUTION INTRAVENOUS ONCE
Status: COMPLETED | OUTPATIENT
Start: 2025-05-22 | End: 2025-05-22

## 2025-05-22 RX ADMIN — SODIUM CHLORIDE 20 ML/HR: 9 INJECTION, SOLUTION INTRAVENOUS at 09:17

## 2025-05-22 RX ADMIN — IRON SUCROSE 200 MG: 20 INJECTION, SOLUTION INTRAVENOUS at 09:18

## 2025-05-22 NOTE — PROGRESS NOTES
Damir Shay  tolerated treatment well with no complications.      Damir Shay is aware of future appt on 07/08/2025 at 08:00 AM with office (Dr. Morales). Patient has completed all venofer infusions and currently has no future infusion appointments.     AVS printed and given to Damir Shay:  No (Declined by Damir Shay) (Lab script provided)

## 2025-06-10 ENCOUNTER — TELEPHONE (OUTPATIENT)
Age: 40
End: 2025-06-10

## 2025-06-10 NOTE — TELEPHONE ENCOUNTER
Left message for patient regarding rescheduling virtual appointment July 8 at 8am. There has been a change in the providers schedule. Virtual appointment has been rescheduled to July 9 at 8am. If this new date and time does not work please return call to the office to reschedule [phone].

## 2025-07-02 LAB
IRON SATN MFR SERPL: 41 % (ref 15–55)
IRON SERPL-MCNC: 123 UG/DL (ref 27–159)
TIBC SERPL-MCNC: 303 UG/DL (ref 250–450)
UIBC SERPL-MCNC: 180 UG/DL (ref 131–425)

## 2025-08-06 LAB
BASOPHILS # BLD AUTO: 0.1 X10E3/UL (ref 0–0.2)
BASOPHILS NFR BLD AUTO: 1 %
EOSINOPHIL # BLD AUTO: 0.2 X10E3/UL (ref 0–0.4)
EOSINOPHIL NFR BLD AUTO: 5 %
ERYTHROCYTE [DISTWIDTH] IN BLOOD BY AUTOMATED COUNT: 12.3 % (ref 11.7–15.4)
FERRITIN SERPL-MCNC: 159 NG/ML (ref 15–150)
HCT VFR BLD AUTO: 37.5 % (ref 34–46.6)
HGB BLD-MCNC: 12.3 G/DL (ref 11.1–15.9)
IMM GRANULOCYTES # BLD: 0 X10E3/UL (ref 0–0.1)
IMM GRANULOCYTES NFR BLD: 0 %
LYMPHOCYTES # BLD AUTO: 1.2 X10E3/UL (ref 0.7–3.1)
LYMPHOCYTES NFR BLD AUTO: 30 %
MCH RBC QN AUTO: 32.8 PG (ref 26.6–33)
MCHC RBC AUTO-ENTMCNC: 32.8 G/DL (ref 31.5–35.7)
MCV RBC AUTO: 100 FL (ref 79–97)
MONOCYTES # BLD AUTO: 0.3 X10E3/UL (ref 0.1–0.9)
MONOCYTES NFR BLD AUTO: 7 %
NEUTROPHILS # BLD AUTO: 2.2 X10E3/UL (ref 1.4–7)
NEUTROPHILS NFR BLD AUTO: 57 %
PLATELET # BLD AUTO: 225 X10E3/UL (ref 150–450)
RBC # BLD AUTO: 3.75 X10E6/UL (ref 3.77–5.28)
WBC # BLD AUTO: 3.9 X10E3/UL (ref 3.4–10.8)